# Patient Record
Sex: MALE | Race: WHITE | Employment: UNEMPLOYED | URBAN - NONMETROPOLITAN AREA
[De-identification: names, ages, dates, MRNs, and addresses within clinical notes are randomized per-mention and may not be internally consistent; named-entity substitution may affect disease eponyms.]

---

## 2017-03-23 ENCOUNTER — HOSPITAL ENCOUNTER (OUTPATIENT)
Age: 32
Setting detail: SPECIMEN
Discharge: HOME | End: 2017-03-23
Payer: MEDICARE

## 2017-03-23 LAB
ABSOLUTE EOS #: 0.2 K/UL (ref 0–0.4)
ABSOLUTE LYMPH #: 2.8 K/UL (ref 1–4.8)
ABSOLUTE MONO #: 0.7 K/UL (ref 0–1)
ALBUMIN SERPL-MCNC: 4.7 G/DL (ref 3.5–5.2)
ALBUMIN/GLOBULIN RATIO: 2.4 (ref 1–2.5)
ALP BLD-CCNC: 70 U/L (ref 40–129)
ALT SERPL-CCNC: 69 U/L (ref 5–41)
ANION GAP SERPL CALCULATED.3IONS-SCNC: 14 MMOL/L (ref 9–17)
AST SERPL-CCNC: 39 U/L
BASOPHILS # BLD: 1 % (ref 0–2)
BASOPHILS ABSOLUTE: 0 K/UL (ref 0–0.2)
BILIRUB SERPL-MCNC: 0.54 MG/DL (ref 0.3–1.2)
BUN BLDV-MCNC: 16 MG/DL (ref 6–20)
BUN/CREAT BLD: 19 (ref 9–20)
CALCIUM SERPL-MCNC: 9.4 MG/DL (ref 8.6–10.4)
CHLORIDE BLD-SCNC: 102 MMOL/L (ref 98–107)
CHOLESTEROL/HDL RATIO: 3.7
CHOLESTEROL: 152 MG/DL
CO2: 27 MMOL/L (ref 20–31)
CREAT SERPL-MCNC: 0.83 MG/DL (ref 0.7–1.2)
DIFFERENTIAL TYPE: NORMAL
EOSINOPHILS RELATIVE PERCENT: 3 % (ref 0–8)
GFR AFRICAN AMERICAN: >60 ML/MIN
GFR NON-AFRICAN AMERICAN: >60 ML/MIN
GFR SERPL CREATININE-BSD FRML MDRD: ABNORMAL ML/MIN/{1.73_M2}
GFR SERPL CREATININE-BSD FRML MDRD: ABNORMAL ML/MIN/{1.73_M2}
GLUCOSE BLD-MCNC: 94 MG/DL (ref 70–99)
HBV SURFACE AB TITR SER: <3.5 MIU/ML
HCT VFR BLD CALC: 47.7 % (ref 41–53)
HDLC SERPL-MCNC: 41 MG/DL
HEMOGLOBIN: 15.8 G/DL (ref 13.5–17)
HEPATITIS B SURFACE ANTIGEN: NONREACTIVE
HEPATITIS C ANTIBODY: NONREACTIVE
LDL CHOLESTEROL: 67 MG/DL (ref 0–130)
LYMPHOCYTES # BLD: 33 % (ref 24–44)
MCH RBC QN AUTO: 28.1 PG (ref 26–34)
MCHC RBC AUTO-ENTMCNC: 33.2 G/DL (ref 31–37)
MCV RBC AUTO: 84.7 FL (ref 80–100)
MONOCYTES # BLD: 9 % (ref 0–12)
PDW BLD-RTO: 14.8 % (ref 12.1–15.2)
PLATELET # BLD: 213 K/UL (ref 140–450)
PLATELET ESTIMATE: NORMAL
PMV BLD AUTO: 9.2 FL (ref 6–12)
POTASSIUM SERPL-SCNC: 4.1 MMOL/L (ref 3.7–5.3)
RBC # BLD: 5.63 M/UL (ref 4.5–5.9)
RBC # BLD: NORMAL 10*6/UL
SEG NEUTROPHILS: 54 % (ref 36–66)
SEGMENTED NEUTROPHILS ABSOLUTE COUNT: 4.7 K/UL (ref 1.8–7.7)
SODIUM BLD-SCNC: 143 MMOL/L (ref 135–144)
T. PALLIDUM, IGG: NONREACTIVE
TOTAL PROTEIN: 6.7 G/DL (ref 6.4–8.3)
TRIGL SERPL-MCNC: 219 MG/DL
VALPROIC ACID LEVEL: 70 UG/ML (ref 50–100)
VALPROIC DATE LAST DOSE: NORMAL
VALPROIC DOSE AMOUNT: NORMAL
VALPROIC TIME LAST DOSE: NORMAL
VLDLC SERPL CALC-MCNC: ABNORMAL MG/DL (ref 1–30)
WBC # BLD: 8.5 K/UL (ref 3.5–11)
WBC # BLD: NORMAL 10*3/UL

## 2017-03-23 PROCEDURE — 36415 COLL VENOUS BLD VENIPUNCTURE: CPT

## 2017-03-23 PROCEDURE — 86780 TREPONEMA PALLIDUM: CPT

## 2017-03-23 PROCEDURE — 85025 COMPLETE CBC W/AUTO DIFF WBC: CPT

## 2017-03-23 PROCEDURE — P9604 ONE-WAY ALLOW PRORATED TRIP: HCPCS

## 2017-03-23 PROCEDURE — 80061 LIPID PANEL: CPT

## 2017-03-23 PROCEDURE — 87340 HEPATITIS B SURFACE AG IA: CPT

## 2017-03-23 PROCEDURE — 80164 ASSAY DIPROPYLACETIC ACD TOT: CPT

## 2017-03-23 PROCEDURE — 80053 COMPREHEN METABOLIC PANEL: CPT

## 2017-03-23 PROCEDURE — 86803 HEPATITIS C AB TEST: CPT

## 2017-03-23 PROCEDURE — 80183 DRUG SCRN QUANT OXCARBAZEPIN: CPT

## 2017-03-23 PROCEDURE — 86706 HEP B SURFACE ANTIBODY: CPT

## 2017-03-27 LAB — OXCARBAZEPINE: 26 UG/ML (ref 3–35)

## 2017-03-30 ENCOUNTER — HOSPITAL ENCOUNTER (OUTPATIENT)
Age: 32
Setting detail: SPECIMEN
Discharge: HOME | End: 2017-03-30
Payer: MEDICARE

## 2017-03-30 LAB
ESTIMATED AVERAGE GLUCOSE: 117 MG/DL
HBA1C MFR BLD: 5.7 % (ref 4.8–5.9)

## 2017-03-30 PROCEDURE — P9604 ONE-WAY ALLOW PRORATED TRIP: HCPCS

## 2017-03-30 PROCEDURE — 36415 COLL VENOUS BLD VENIPUNCTURE: CPT

## 2017-03-30 PROCEDURE — 83036 HEMOGLOBIN GLYCOSYLATED A1C: CPT

## 2017-04-10 ENCOUNTER — HOSPITAL ENCOUNTER (OUTPATIENT)
Dept: NON INVASIVE DIAGNOSTICS | Age: 32
Discharge: HOME | End: 2017-04-10
Payer: MEDICARE

## 2017-04-10 LAB
EKG ATRIAL RATE: 93 BPM
EKG P AXIS: 45 DEGREES
EKG P-R INTERVAL: 210 MS
EKG Q-T INTERVAL: 326 MS
EKG QRS DURATION: 102 MS
EKG QTC CALCULATION (BAZETT): 405 MS
EKG R AXIS: 103 DEGREES
EKG T AXIS: 2 DEGREES
EKG VENTRICULAR RATE: 93 BPM

## 2017-04-10 PROCEDURE — 93005 ELECTROCARDIOGRAM TRACING: CPT

## 2017-09-11 ENCOUNTER — HOSPITAL ENCOUNTER (OUTPATIENT)
Dept: NON INVASIVE DIAGNOSTICS | Age: 32
Discharge: HOME | End: 2017-09-11
Payer: MEDICARE

## 2017-09-11 PROCEDURE — 93005 ELECTROCARDIOGRAM TRACING: CPT

## 2017-09-12 LAB
EKG ATRIAL RATE: 85 BPM
EKG P AXIS: 59 DEGREES
EKG P-R INTERVAL: 206 MS
EKG Q-T INTERVAL: 348 MS
EKG QRS DURATION: 98 MS
EKG QTC CALCULATION (BAZETT): 414 MS
EKG R AXIS: 77 DEGREES
EKG T AXIS: 13 DEGREES
EKG VENTRICULAR RATE: 85 BPM

## 2017-09-21 ENCOUNTER — HOSPITAL ENCOUNTER (OUTPATIENT)
Age: 32
Setting detail: SPECIMEN
Discharge: HOME | End: 2017-09-21
Payer: MEDICARE

## 2017-09-21 LAB
ALBUMIN SERPL-MCNC: 5.2 G/DL (ref 3.5–5.2)
ALBUMIN/GLOBULIN RATIO: 3.3 (ref 1–2.5)
ALP BLD-CCNC: 79 U/L (ref 40–129)
ALT SERPL-CCNC: 53 U/L (ref 5–41)
ANION GAP SERPL CALCULATED.3IONS-SCNC: 18 MMOL/L (ref 9–17)
AST SERPL-CCNC: 26 U/L
BILIRUB SERPL-MCNC: 0.51 MG/DL (ref 0.3–1.2)
BUN BLDV-MCNC: 12 MG/DL (ref 6–20)
BUN/CREAT BLD: 16 (ref 9–20)
CALCIUM SERPL-MCNC: 10 MG/DL (ref 8.6–10.4)
CHLORIDE BLD-SCNC: 100 MMOL/L (ref 98–107)
CO2: 24 MMOL/L (ref 20–31)
CREAT SERPL-MCNC: 0.73 MG/DL (ref 0.7–1.2)
ESTIMATED AVERAGE GLUCOSE: 100 MG/DL
GFR AFRICAN AMERICAN: >60 ML/MIN
GFR NON-AFRICAN AMERICAN: >60 ML/MIN
GFR SERPL CREATININE-BSD FRML MDRD: ABNORMAL ML/MIN/{1.73_M2}
GFR SERPL CREATININE-BSD FRML MDRD: ABNORMAL ML/MIN/{1.73_M2}
GLUCOSE BLD-MCNC: 83 MG/DL (ref 70–99)
HBA1C MFR BLD: 5.1 % (ref 4.8–5.9)
HCT VFR BLD CALC: 47.6 % (ref 41–53)
HEMOGLOBIN: 16.2 G/DL (ref 13.5–17)
MCH RBC QN AUTO: 28.7 PG (ref 26–34)
MCHC RBC AUTO-ENTMCNC: 34 G/DL (ref 31–37)
MCV RBC AUTO: 84.7 FL (ref 80–100)
PDW BLD-RTO: 14.8 % (ref 12.1–15.2)
PLATELET # BLD: 179 K/UL (ref 140–450)
PMV BLD AUTO: 9.3 FL (ref 6–12)
POTASSIUM SERPL-SCNC: 4.5 MMOL/L (ref 3.7–5.3)
RBC # BLD: 5.62 M/UL (ref 4.5–5.9)
SODIUM BLD-SCNC: 142 MMOL/L (ref 135–144)
TOTAL PROTEIN: 6.8 G/DL (ref 6.4–8.3)
VALPROIC ACID LEVEL: 82 UG/ML (ref 50–100)
VALPROIC DATE LAST DOSE: NORMAL
VALPROIC DOSE AMOUNT: NORMAL
VALPROIC TIME LAST DOSE: NORMAL
WBC # BLD: 7.5 K/UL (ref 3.5–11)

## 2017-09-21 PROCEDURE — 80183 DRUG SCRN QUANT OXCARBAZEPIN: CPT

## 2017-09-21 PROCEDURE — 85027 COMPLETE CBC AUTOMATED: CPT

## 2017-09-21 PROCEDURE — 83036 HEMOGLOBIN GLYCOSYLATED A1C: CPT

## 2017-09-21 PROCEDURE — 36415 COLL VENOUS BLD VENIPUNCTURE: CPT

## 2017-09-21 PROCEDURE — 80164 ASSAY DIPROPYLACETIC ACD TOT: CPT

## 2017-09-21 PROCEDURE — 80053 COMPREHEN METABOLIC PANEL: CPT

## 2017-09-21 PROCEDURE — P9604 ONE-WAY ALLOW PRORATED TRIP: HCPCS

## 2017-09-25 LAB — OXCARBAZEPINE: 28 UG/ML (ref 3–35)

## 2017-11-21 ENCOUNTER — HOSPITAL ENCOUNTER (OUTPATIENT)
Age: 32
Setting detail: SPECIMEN
Discharge: HOME | End: 2017-11-21
Payer: MEDICARE

## 2017-11-24 ENCOUNTER — HOSPITAL ENCOUNTER (OUTPATIENT)
Age: 32
Setting detail: SPECIMEN
Discharge: HOME | End: 2017-11-24
Payer: MEDICARE

## 2017-11-24 LAB
HBV SURFACE AB TITR SER: >1000 MIU/ML
VALPROIC ACID LEVEL: 71 UG/ML (ref 50–125)
VALPROIC DATE LAST DOSE: NORMAL
VALPROIC DOSE AMOUNT: NORMAL
VALPROIC TIME LAST DOSE: NORMAL

## 2017-11-24 PROCEDURE — 86317 IMMUNOASSAY INFECTIOUS AGENT: CPT

## 2017-11-24 PROCEDURE — 80164 ASSAY DIPROPYLACETIC ACD TOT: CPT

## 2017-11-24 PROCEDURE — 36415 COLL VENOUS BLD VENIPUNCTURE: CPT

## 2017-11-24 PROCEDURE — P9604 ONE-WAY ALLOW PRORATED TRIP: HCPCS

## 2018-01-04 ENCOUNTER — HOSPITAL ENCOUNTER (OUTPATIENT)
Age: 33
Setting detail: SPECIMEN
Discharge: HOME | End: 2018-01-04
Payer: MEDICARE

## 2018-01-04 LAB
VALPROIC ACID LEVEL: 84 UG/ML (ref 50–125)
VALPROIC DATE LAST DOSE: NORMAL
VALPROIC DOSE AMOUNT: NORMAL
VALPROIC TIME LAST DOSE: NORMAL

## 2018-01-04 PROCEDURE — 80164 ASSAY DIPROPYLACETIC ACD TOT: CPT

## 2018-01-04 PROCEDURE — P9604 ONE-WAY ALLOW PRORATED TRIP: HCPCS

## 2018-01-04 PROCEDURE — 36415 COLL VENOUS BLD VENIPUNCTURE: CPT

## 2018-01-17 ENCOUNTER — HOSPITAL ENCOUNTER (EMERGENCY)
Age: 33
Discharge: OTHER INSTITUTION | End: 2018-01-18
Attending: EMERGENCY MEDICINE
Payer: MEDICARE

## 2018-01-17 LAB
ALBUMIN SERPL-MCNC: 4.8 G/DL (ref 3.5–5.2)
ALBUMIN/GLOBULIN RATIO: 2.3 (ref 1–2.5)
ALP BLD-CCNC: 61 U/L (ref 40–129)
ALT SERPL-CCNC: 37 U/L (ref 5–41)
ANION GAP SERPL CALCULATED.3IONS-SCNC: 14 MMOL/L (ref 9–17)
AST SERPL-CCNC: 29 U/L
BILIRUB SERPL-MCNC: 0.38 MG/DL (ref 0.3–1.2)
BUN BLDV-MCNC: 17 MG/DL (ref 6–20)
BUN/CREAT BLD: 22 (ref 9–20)
CALCIUM SERPL-MCNC: 9.5 MG/DL (ref 8.6–10.4)
CHLORIDE BLD-SCNC: 96 MMOL/L (ref 98–107)
CO2: 26 MMOL/L (ref 20–31)
CREAT SERPL-MCNC: 0.78 MG/DL (ref 0.7–1.2)
GFR AFRICAN AMERICAN: >60 ML/MIN
GFR NON-AFRICAN AMERICAN: >60 ML/MIN
GFR SERPL CREATININE-BSD FRML MDRD: ABNORMAL ML/MIN/{1.73_M2}
GFR SERPL CREATININE-BSD FRML MDRD: ABNORMAL ML/MIN/{1.73_M2}
GLUCOSE BLD-MCNC: 109 MG/DL (ref 70–99)
HCT VFR BLD CALC: 46.4 % (ref 41–53)
HEMOGLOBIN: 14.9 G/DL (ref 13.5–17)
MCH RBC QN AUTO: 27.6 PG (ref 26–34)
MCHC RBC AUTO-ENTMCNC: 32.1 G/DL (ref 31–37)
MCV RBC AUTO: 85.9 FL (ref 80–100)
MYOGLOBIN: 36 NG/ML (ref 28–72)
NRBC AUTOMATED: NORMAL PER 100 WBC
PDW BLD-RTO: 14.7 % (ref 12.1–15.2)
PLATELET # BLD: 214 K/UL (ref 140–450)
PMV BLD AUTO: 8.4 FL (ref 6–12)
POTASSIUM SERPL-SCNC: 4.2 MMOL/L (ref 3.7–5.3)
RBC # BLD: 5.4 M/UL (ref 4.5–5.9)
SODIUM BLD-SCNC: 136 MMOL/L (ref 135–144)
TOTAL CK: 178 U/L (ref 39–308)
TOTAL PROTEIN: 6.9 G/DL (ref 6.4–8.3)
WBC # BLD: 10.8 K/UL (ref 3.5–11)

## 2018-01-17 PROCEDURE — 83874 ASSAY OF MYOGLOBIN: CPT

## 2018-01-17 PROCEDURE — 6360000002 HC RX W HCPCS: Performed by: EMERGENCY MEDICINE

## 2018-01-17 PROCEDURE — 90715 TDAP VACCINE 7 YRS/> IM: CPT | Performed by: EMERGENCY MEDICINE

## 2018-01-17 PROCEDURE — 90471 IMMUNIZATION ADMIN: CPT | Performed by: EMERGENCY MEDICINE

## 2018-01-17 PROCEDURE — 80053 COMPREHEN METABOLIC PANEL: CPT

## 2018-01-17 PROCEDURE — 82550 ASSAY OF CK (CPK): CPT

## 2018-01-17 PROCEDURE — 99284 EMERGENCY DEPT VISIT MOD MDM: CPT

## 2018-01-17 PROCEDURE — 96374 THER/PROPH/DIAG INJ IV PUSH: CPT

## 2018-01-17 PROCEDURE — 85027 COMPLETE CBC AUTOMATED: CPT

## 2018-01-17 RX ORDER — LORAZEPAM 2 MG/ML
0.5 INJECTION INTRAMUSCULAR ONCE
Status: COMPLETED | OUTPATIENT
Start: 2018-01-17 | End: 2018-01-17

## 2018-01-17 RX ADMIN — LORAZEPAM 0.5 MG: 2 INJECTION, SOLUTION INTRAMUSCULAR; INTRAVENOUS at 21:42

## 2018-01-17 RX ADMIN — TETANUS TOXOID, REDUCED DIPHTHERIA TOXOID AND ACELLULAR PERTUSSIS VACCINE, ADSORBED 0.5 ML: 5; 2.5; 8; 8; 2.5 SUSPENSION INTRAMUSCULAR at 23:47

## 2018-01-18 ENCOUNTER — HOSPITAL ENCOUNTER (INPATIENT)
Age: 33
LOS: 2 days | Discharge: NURSING, REHAB OR LONG-TERM CARE | DRG: 923 | End: 2018-01-20
Attending: EMERGENCY MEDICINE | Admitting: SURGERY
Payer: MEDICARE

## 2018-01-18 VITALS
DIASTOLIC BLOOD PRESSURE: 80 MMHG | OXYGEN SATURATION: 98 % | HEART RATE: 100 BPM | TEMPERATURE: 98.9 F | RESPIRATION RATE: 18 BRPM | SYSTOLIC BLOOD PRESSURE: 168 MMHG

## 2018-01-18 DIAGNOSIS — T33.821A FROSTBITE OF BOTH FEET, INITIAL ENCOUNTER: Primary | ICD-10-CM

## 2018-01-18 DIAGNOSIS — T33.822A FROSTBITE OF BOTH FEET, INITIAL ENCOUNTER: Primary | ICD-10-CM

## 2018-01-18 LAB
ANION GAP SERPL CALCULATED.3IONS-SCNC: 14 MMOL/L (ref 9–17)
BUN BLDV-MCNC: 14 MG/DL (ref 6–20)
BUN/CREAT BLD: ABNORMAL (ref 9–20)
CALCIUM SERPL-MCNC: 9.7 MG/DL (ref 8.6–10.4)
CHLORIDE BLD-SCNC: 99 MMOL/L (ref 98–107)
CO2: 23 MMOL/L (ref 20–31)
CREAT SERPL-MCNC: 0.79 MG/DL (ref 0.7–1.2)
GFR AFRICAN AMERICAN: >60 ML/MIN
GFR NON-AFRICAN AMERICAN: >60 ML/MIN
GFR SERPL CREATININE-BSD FRML MDRD: ABNORMAL ML/MIN/{1.73_M2}
GFR SERPL CREATININE-BSD FRML MDRD: ABNORMAL ML/MIN/{1.73_M2}
GLUCOSE BLD-MCNC: 109 MG/DL (ref 70–99)
HCT VFR BLD CALC: 48.3 % (ref 40.7–50.3)
HEMOGLOBIN: 15.5 G/DL (ref 13–17)
MCH RBC QN AUTO: 28.7 PG (ref 25.2–33.5)
MCHC RBC AUTO-ENTMCNC: 32.1 G/DL (ref 28.4–34.8)
MCV RBC AUTO: 89.3 FL (ref 82.6–102.9)
NRBC AUTOMATED: 0 PER 100 WBC
PDW BLD-RTO: 14 % (ref 11.8–14.4)
PLATELET # BLD: 199 K/UL (ref 138–453)
PMV BLD AUTO: 10.2 FL (ref 8.1–13.5)
POTASSIUM SERPL-SCNC: 4.2 MMOL/L (ref 3.7–5.3)
RBC # BLD: 5.41 M/UL (ref 4.21–5.77)
SODIUM BLD-SCNC: 136 MMOL/L (ref 135–144)
WBC # BLD: 9.5 K/UL (ref 3.5–11.3)

## 2018-01-18 PROCEDURE — 94762 N-INVAS EAR/PLS OXIMTRY CONT: CPT

## 2018-01-18 PROCEDURE — 6370000000 HC RX 637 (ALT 250 FOR IP): Performed by: STUDENT IN AN ORGANIZED HEALTH CARE EDUCATION/TRAINING PROGRAM

## 2018-01-18 PROCEDURE — 6360000002 HC RX W HCPCS: Performed by: EMERGENCY MEDICINE

## 2018-01-18 PROCEDURE — 2580000003 HC RX 258: Performed by: STUDENT IN AN ORGANIZED HEALTH CARE EDUCATION/TRAINING PROGRAM

## 2018-01-18 PROCEDURE — 6370000000 HC RX 637 (ALT 250 FOR IP): Performed by: NURSE PRACTITIONER

## 2018-01-18 PROCEDURE — 0HDMXZZ EXTRACTION OF RIGHT FOOT SKIN, EXTERNAL APPROACH: ICD-10-PCS | Performed by: PLASTIC SURGERY

## 2018-01-18 PROCEDURE — 99284 EMERGENCY DEPT VISIT MOD MDM: CPT

## 2018-01-18 PROCEDURE — G8978 MOBILITY CURRENT STATUS: HCPCS

## 2018-01-18 PROCEDURE — 97530 THERAPEUTIC ACTIVITIES: CPT

## 2018-01-18 PROCEDURE — 97162 PT EVAL MOD COMPLEX 30 MIN: CPT

## 2018-01-18 PROCEDURE — G8979 MOBILITY GOAL STATUS: HCPCS

## 2018-01-18 PROCEDURE — 2500000003 HC RX 250 WO HCPCS: Performed by: STUDENT IN AN ORGANIZED HEALTH CARE EDUCATION/TRAINING PROGRAM

## 2018-01-18 PROCEDURE — 36415 COLL VENOUS BLD VENIPUNCTURE: CPT

## 2018-01-18 PROCEDURE — 80048 BASIC METABOLIC PNL TOTAL CA: CPT

## 2018-01-18 PROCEDURE — 1200000000 HC SEMI PRIVATE

## 2018-01-18 PROCEDURE — 0HDNXZZ EXTRACTION OF LEFT FOOT SKIN, EXTERNAL APPROACH: ICD-10-PCS | Performed by: PLASTIC SURGERY

## 2018-01-18 PROCEDURE — 6360000002 HC RX W HCPCS: Performed by: SURGERY

## 2018-01-18 PROCEDURE — 16020 DRESS/DEBRID P-THICK BURN S: CPT

## 2018-01-18 PROCEDURE — 85027 COMPLETE CBC AUTOMATED: CPT

## 2018-01-18 RX ORDER — PANTOPRAZOLE SODIUM 40 MG/1
40 TABLET, DELAYED RELEASE ORAL
Status: DISCONTINUED | OUTPATIENT
Start: 2018-01-18 | End: 2018-01-18

## 2018-01-18 RX ORDER — ONDANSETRON 2 MG/ML
4 INJECTION INTRAMUSCULAR; INTRAVENOUS EVERY 6 HOURS PRN
Status: DISCONTINUED | OUTPATIENT
Start: 2018-01-18 | End: 2018-01-21 | Stop reason: HOSPADM

## 2018-01-18 RX ORDER — ENALAPRIL MALEATE 10 MG/1
10 TABLET ORAL DAILY
Status: DISCONTINUED | OUTPATIENT
Start: 2018-01-18 | End: 2018-01-21 | Stop reason: HOSPADM

## 2018-01-18 RX ORDER — FENTANYL CITRATE 50 UG/ML
25 INJECTION, SOLUTION INTRAMUSCULAR; INTRAVENOUS
Status: DISCONTINUED | OUTPATIENT
Start: 2018-01-18 | End: 2018-01-21 | Stop reason: HOSPADM

## 2018-01-18 RX ORDER — DIVALPROEX SODIUM 250 MG/1
1250 TABLET, DELAYED RELEASE ORAL NIGHTLY
COMMUNITY

## 2018-01-18 RX ORDER — QUETIAPINE FUMARATE 100 MG/1
100 TABLET, FILM COATED ORAL
COMMUNITY
End: 2018-02-03

## 2018-01-18 RX ORDER — ACETAMINOPHEN 325 MG/1
650 TABLET ORAL EVERY 4 HOURS PRN
COMMUNITY

## 2018-01-18 RX ORDER — MORPHINE SULFATE 4 MG/ML
4 INJECTION, SOLUTION INTRAMUSCULAR; INTRAVENOUS
Status: DISCONTINUED | OUTPATIENT
Start: 2018-01-18 | End: 2018-01-21 | Stop reason: HOSPADM

## 2018-01-18 RX ORDER — ESCITALOPRAM OXALATE 10 MG/1
20 TABLET ORAL DAILY
Status: DISCONTINUED | OUTPATIENT
Start: 2018-01-18 | End: 2018-01-18

## 2018-01-18 RX ORDER — QUETIAPINE FUMARATE 100 MG/1
100 TABLET, FILM COATED ORAL EVERY MORNING
Status: DISCONTINUED | OUTPATIENT
Start: 2018-01-18 | End: 2018-01-18

## 2018-01-18 RX ORDER — QUETIAPINE FUMARATE 200 MG/1
400 TABLET, FILM COATED ORAL
Status: DISCONTINUED | OUTPATIENT
Start: 2018-01-18 | End: 2018-01-21 | Stop reason: HOSPADM

## 2018-01-18 RX ORDER — LITHIUM CARBONATE 300 MG/1
300 CAPSULE ORAL
Status: DISCONTINUED | OUTPATIENT
Start: 2018-01-18 | End: 2018-01-18

## 2018-01-18 RX ORDER — DIVALPROEX SODIUM 250 MG/1
250 TABLET, DELAYED RELEASE ORAL
COMMUNITY

## 2018-01-18 RX ORDER — LORATADINE 10 MG/1
10 TABLET ORAL DAILY PRN
COMMUNITY

## 2018-01-18 RX ORDER — DIVALPROEX SODIUM 250 MG/1
250 TABLET, EXTENDED RELEASE ORAL EVERY MORNING
Status: DISCONTINUED | OUTPATIENT
Start: 2018-01-18 | End: 2018-01-21 | Stop reason: HOSPADM

## 2018-01-18 RX ORDER — OXCARBAZEPINE 600 MG/1
600 TABLET, FILM COATED ORAL 3 TIMES DAILY
COMMUNITY

## 2018-01-18 RX ORDER — ENALAPRIL MALEATE 10 MG/1
10 TABLET ORAL 2 TIMES DAILY
Status: DISCONTINUED | OUTPATIENT
Start: 2018-01-18 | End: 2018-01-18

## 2018-01-18 RX ORDER — ACETAMINOPHEN 325 MG/1
650 TABLET ORAL EVERY 4 HOURS PRN
Status: DISCONTINUED | OUTPATIENT
Start: 2018-01-18 | End: 2018-01-21 | Stop reason: HOSPADM

## 2018-01-18 RX ORDER — SODIUM CHLORIDE 0.9 % (FLUSH) 0.9 %
10 SYRINGE (ML) INJECTION PRN
Status: DISCONTINUED | OUTPATIENT
Start: 2018-01-18 | End: 2018-01-21 | Stop reason: HOSPADM

## 2018-01-18 RX ORDER — ZONISAMIDE 100 MG/1
100 CAPSULE ORAL 3 TIMES DAILY
Status: DISCONTINUED | OUTPATIENT
Start: 2018-01-18 | End: 2018-01-18

## 2018-01-18 RX ORDER — QUETIAPINE FUMARATE 200 MG/1
400 TABLET, FILM COATED ORAL DAILY
Status: DISCONTINUED | OUTPATIENT
Start: 2018-01-18 | End: 2018-01-18

## 2018-01-18 RX ORDER — SODIUM CHLORIDE 0.9 % (FLUSH) 0.9 %
10 SYRINGE (ML) INJECTION EVERY 12 HOURS SCHEDULED
Status: DISCONTINUED | OUTPATIENT
Start: 2018-01-18 | End: 2018-01-21 | Stop reason: HOSPADM

## 2018-01-18 RX ORDER — QUETIAPINE FUMARATE 200 MG/1
200 TABLET, FILM COATED ORAL 2 TIMES DAILY
Status: DISCONTINUED | OUTPATIENT
Start: 2018-01-18 | End: 2018-01-18

## 2018-01-18 RX ORDER — BUSPIRONE HYDROCHLORIDE 15 MG/1
30 TABLET ORAL 2 TIMES DAILY
Status: DISCONTINUED | OUTPATIENT
Start: 2018-01-18 | End: 2018-01-21 | Stop reason: HOSPADM

## 2018-01-18 RX ORDER — CETIRIZINE HYDROCHLORIDE 10 MG/1
10 TABLET ORAL DAILY
Status: DISCONTINUED | OUTPATIENT
Start: 2018-01-18 | End: 2018-01-21 | Stop reason: HOSPADM

## 2018-01-18 RX ORDER — QUETIAPINE FUMARATE 100 MG/1
100 TABLET, FILM COATED ORAL EVERY MORNING
Status: DISCONTINUED | OUTPATIENT
Start: 2018-01-19 | End: 2018-01-21 | Stop reason: HOSPADM

## 2018-01-18 RX ORDER — OXYCODONE HYDROCHLORIDE 5 MG/1
10 TABLET ORAL EVERY 4 HOURS PRN
Status: DISCONTINUED | OUTPATIENT
Start: 2018-01-18 | End: 2018-01-21 | Stop reason: HOSPADM

## 2018-01-18 RX ORDER — QUETIAPINE FUMARATE 200 MG/1
500 TABLET, FILM COATED ORAL
COMMUNITY

## 2018-01-18 RX ORDER — DIVALPROEX SODIUM 500 MG/1
500 TABLET, EXTENDED RELEASE ORAL 2 TIMES DAILY
Status: DISCONTINUED | OUTPATIENT
Start: 2018-01-18 | End: 2018-01-18

## 2018-01-18 RX ORDER — OXCARBAZEPINE 300 MG/1
600 TABLET, FILM COATED ORAL 3 TIMES DAILY
Status: DISCONTINUED | OUTPATIENT
Start: 2018-01-18 | End: 2018-01-21 | Stop reason: HOSPADM

## 2018-01-18 RX ORDER — OXCARBAZEPINE 150 MG/1
150 TABLET, FILM COATED ORAL 4 TIMES DAILY
Status: DISCONTINUED | OUTPATIENT
Start: 2018-01-18 | End: 2018-01-18

## 2018-01-18 RX ORDER — OXYCODONE HYDROCHLORIDE 5 MG/1
5 TABLET ORAL EVERY 4 HOURS PRN
Status: DISCONTINUED | OUTPATIENT
Start: 2018-01-18 | End: 2018-01-21 | Stop reason: HOSPADM

## 2018-01-18 RX ORDER — FENTANYL CITRATE 50 UG/ML
50 INJECTION, SOLUTION INTRAMUSCULAR; INTRAVENOUS
Status: DISCONTINUED | OUTPATIENT
Start: 2018-01-18 | End: 2018-01-21 | Stop reason: HOSPADM

## 2018-01-18 RX ORDER — OXCARBAZEPINE 150 MG/1
150 TABLET, FILM COATED ORAL 3 TIMES DAILY
Status: DISCONTINUED | OUTPATIENT
Start: 2018-01-18 | End: 2018-01-18

## 2018-01-18 RX ORDER — MORPHINE SULFATE 2 MG/ML
2 INJECTION, SOLUTION INTRAMUSCULAR; INTRAVENOUS
Status: DISCONTINUED | OUTPATIENT
Start: 2018-01-18 | End: 2018-01-21 | Stop reason: HOSPADM

## 2018-01-18 RX ADMIN — DIVALPROEX SODIUM 250 MG: 250 TABLET, FILM COATED, EXTENDED RELEASE ORAL at 13:02

## 2018-01-18 RX ADMIN — BUSPIRONE HYDROCHLORIDE 30 MG: 15 TABLET ORAL at 13:03

## 2018-01-18 RX ADMIN — QUETIAPINE FUMARATE 400 MG: 200 TABLET ORAL at 13:02

## 2018-01-18 RX ADMIN — OXYCODONE HYDROCHLORIDE 10 MG: 5 TABLET ORAL at 20:07

## 2018-01-18 RX ADMIN — OXYCODONE HYDROCHLORIDE 10 MG: 5 TABLET ORAL at 05:50

## 2018-01-18 RX ADMIN — Medication 10 ML: at 13:18

## 2018-01-18 RX ADMIN — Medication 10 ML: at 20:03

## 2018-01-18 RX ADMIN — CETIRIZINE HYDROCHLORIDE 10 MG: 10 TABLET ORAL at 13:02

## 2018-01-18 RX ADMIN — OXCARBAZEPINE 600 MG: 300 TABLET, FILM COATED ORAL at 20:22

## 2018-01-18 RX ADMIN — METOPROLOL TARTRATE 12.5 MG: 25 TABLET ORAL at 13:20

## 2018-01-18 RX ADMIN — FENTANYL CITRATE 25 MCG: 50 INJECTION INTRAMUSCULAR; INTRAVENOUS at 23:25

## 2018-01-18 RX ADMIN — SILVER SULFADIAZINE: 10 CREAM TOPICAL at 23:26

## 2018-01-18 RX ADMIN — DIVALPROEX SODIUM 1250 MG: 250 TABLET, FILM COATED, EXTENDED RELEASE ORAL at 18:05

## 2018-01-18 RX ADMIN — ENOXAPARIN SODIUM 30 MG: 30 INJECTION SUBCUTANEOUS at 20:22

## 2018-01-18 RX ADMIN — OXYCODONE HYDROCHLORIDE 10 MG: 5 TABLET ORAL at 16:04

## 2018-01-18 RX ADMIN — OXCARBAZEPINE 600 MG: 300 TABLET, FILM COATED ORAL at 13:02

## 2018-01-18 RX ADMIN — SILVER SULFADIAZINE: 10 CREAM TOPICAL at 13:09

## 2018-01-18 RX ADMIN — BUSPIRONE HYDROCHLORIDE 30 MG: 15 TABLET ORAL at 20:03

## 2018-01-18 RX ADMIN — ENALAPRIL MALEATE 10 MG: 10 TABLET ORAL at 13:02

## 2018-01-18 RX ADMIN — OXYCODONE HYDROCHLORIDE 10 MG: 5 TABLET ORAL at 11:47

## 2018-01-18 RX ADMIN — FENTANYL CITRATE 25 MCG: 50 INJECTION INTRAMUSCULAR; INTRAVENOUS at 13:03

## 2018-01-18 ASSESSMENT — ENCOUNTER SYMPTOMS
SINUS PRESSURE: 0
ABDOMINAL PAIN: 0
COLOR CHANGE: 0
WHEEZING: 0
NAUSEA: 0
VOICE CHANGE: 0
ABDOMINAL DISTENTION: 0
PHOTOPHOBIA: 0
VOMITING: 0
SORE THROAT: 0
BLOOD IN STOOL: 0
SINUS PAIN: 0
SHORTNESS OF BREATH: 0
CONSTIPATION: 0
CHEST TIGHTNESS: 0
COUGH: 0
DIARRHEA: 0
CHOKING: 0
TROUBLE SWALLOWING: 0

## 2018-01-18 ASSESSMENT — PAIN SCALES - GENERAL
PAINLEVEL_OUTOF10: 7
PAINLEVEL_OUTOF10: 10
PAINLEVEL_OUTOF10: 6

## 2018-01-18 ASSESSMENT — PAIN DESCRIPTION - FREQUENCY: FREQUENCY: CONTINUOUS

## 2018-01-18 ASSESSMENT — PAIN DESCRIPTION - ORIENTATION
ORIENTATION: RIGHT;LEFT

## 2018-01-18 ASSESSMENT — PAIN DESCRIPTION - PAIN TYPE
TYPE: ACUTE PAIN

## 2018-01-18 ASSESSMENT — PAIN SCALES - WONG BAKER
WONGBAKER_NUMERICALRESPONSE: 6
WONGBAKER_NUMERICALRESPONSE: 6
WONGBAKER_NUMERICALRESPONSE: 8
WONGBAKER_NUMERICALRESPONSE: 8

## 2018-01-18 ASSESSMENT — PAIN DESCRIPTION - LOCATION
LOCATION: FOOT

## 2018-01-18 ASSESSMENT — PAIN DESCRIPTION - DESCRIPTORS: DESCRIPTORS: THROBBING

## 2018-01-18 ASSESSMENT — PAIN DESCRIPTION - ONSET: ONSET: ON-GOING

## 2018-01-18 ASSESSMENT — PAIN DESCRIPTION - PROGRESSION: CLINICAL_PROGRESSION: NOT CHANGED

## 2018-01-18 NOTE — PROGRESS NOTES
[facility]  Occupational Therapy Not Seen Note    Patient not available for Occupational Therapy due to:    [] Testing:    [] Hemodialysis    [] Blood Transfusion in Progress    []Refusal by Patient:    [] Surgery/Procedure:    [] Strict Bedrest    [] Sedation    [] Spine Precautions     [] Pt being transferred to palliative care at this time. Spoke with pt/family and OT services to be defered. [] Pt independent with functional mobility and functional tasks. Pt with no OT acute care needs at this time, will defer OT eval.    [x] Other: Defer to PT for mobility needs. Pt at baseline for all other ADLs. Pt has caregiver and has severe DD. Defer to PT for mobility needs.     Next Scheduled Treatment:    Signature: Rosetta Patterson OTR/DEB, OTHARJINDER

## 2018-01-18 NOTE — CONSULTS
Plastic/Burn Surgery Consult Note    CHIEF COMPLAINT:  Bilateral feet frostbite. HISTORY OF PRESENT ILLNESS: Mireille Reynoso is a 29 y.o. male with h/o mental retardation and intermittent explosive disorder. Patients family is with him at bedside. Patient's family states that he is at a temporary group home. They state that the patient was wandering around outside without shoes on and the facility did not restrain him or stop him from going outdoors. Patients family states that they were told by the facility he was outside for 10 minutes however they read the incident report which said 40 minutes. Patient states that they are very painful and he is unable to walk on his feet. Past Medical History:    Past Medical History:   Diagnosis Date    Chronic mental illness      Past Surgical History:    Past Surgical History:   Procedure Laterality Date   [address] CYST DRAINAGE  2005     Medications Prior to Admission:   Prior to Admission medications    Medication Sig Start Date End Date Taking?  Authorizing Provider   divalproex (DEPAKOTE) 250 MG DR tablet Take 250 mg by mouth daily (with breakfast)   Yes Historical Provider, MD   divalproex (DEPAKOTE) 250 MG DR tablet Take 1,250 mg by mouth nightly   Yes Historical Provider, MD   metoprolol tartrate (LOPRESSOR) 25 MG tablet Take 12.5 mg by mouth 2 times daily   Yes Historical Provider, MD   OXcarbazepine (TRILEPTAL) 600 MG tablet Take 600 mg by mouth 3 times daily   Yes Historical Provider, MD   QUEtiapine (SEROQUEL) 100 MG tablet Take 100 mg by mouth daily (with breakfast)   Yes Historical Provider, MD   QUEtiapine (SEROQUEL) 200 MG tablet Take 400 mg by mouth Daily with lunch   Yes Historical Provider, MD   acetaminophen (TYLENOL) 325 MG tablet Take 650 mg by mouth every 4 hours as needed for Pain or Fever   Yes Historical Provider, MD   loratadine (CLARITIN) 10 MG tablet Take 10 mg by mouth daily as needed   Yes Historical Provider, MD   busPIRone (BUSPAR) 30 MG tablet take 1 tablet by mouth twice a day 11/6/16   Historical Provider, MD   enalapril (VASOTEC) 10 MG tablet Take 10 mg by mouth daily     Historical Provider, MD     Allergies:  Abilify [aripiprazole] and Lithium    Social History:   Social History   Substance Use Topics    Smoking status: Never Smoker    Smokeless tobacco: Never Used    Alcohol use No     Family History: Non contributory. REVIEW OF SYSTEMS:  Negative other than stated above. General: No fever or chills. Neuro: No headache   Cardiopulmonary: No chest pain or shortness of breath. GI: No nausea, vomiting, diarrhea, or abdominal pain. PHYSICAL EXAM:  BP (!) 158/52   Pulse 80   Temp 98.8 °F (37.1 °C) (Axillary)   Resp 16   Wt 248 lb 14.4 oz (112.9 kg)   SpO2 96%   BMI 42.72 kg/m²   Gen: Alert and oriented, NAD. Chest: Symmetric chest excursion, non- labored breathing. Abdomen: non-distended, non-tender. Neurological: SILT bilateral feet. Vascular: DP and PT pulses are 2/4 bilateral. Non-pitting edema to bilateral feet. Musculoskeletal: Muscle strength deferred due to pain. Pain on palpation of bullae. Skin: Serous bullae present on right foot. Post de-kaylah the wound measures approximately 12.0 x 8.0 x 0.2 cm. Granular wound base. No proximal streaking, malodor or signs of infection. Two serous bullae present on left foot. Post de-kaylah the proximal wound measures approximately 6.0 x 5.0 x 0.2 cm. Post de-kaylah the distal wound measures approximately 6.0 x 6.0 x 0.2 cm. Granular wound base. No proximal streaking, malodor, or signs of infection.      Lab Results   Component Value Date    WBC 9.5 01/18/2018    HGB 15.5 01/18/2018    HCT 48.3 01/18/2018    MCV 89.3 01/18/2018     01/18/2018     Lab Results   Component Value Date     01/18/2018    K 4.2 01/18/2018    CL 99 01/18/2018    CO2 23 01/18/2018    BUN 14 01/18/2018    CREATININE 0.79 01/18/2018 GLUCOSE 109 01/18/2018    GLUCOSE 95 10/15/2011    CALCIUM 9.7 01/18/2018        Assessment:   -29 y.o. male with frostbite to bilateral feet and serous bullae bilateral feet s/p de-kaylah. Patient Active Problem List   Diagnosis    Chronic mental illness    Frostbite of feet, bilateral        Plan:  -Patient's family consented to procedure. Fentanyl and roxicodone given prior to procedure. Deroofed the blisters on bilateral feet with sterile scissors and pickups. Applied a silvadene and gauze dressing with ace wrap. Patient reported mild post procedural pain.   -Discussed treatment plan with family including long term treatment of frostbite with wound care. -Twice daily dressing changes with silvadene cream and dry sterile dressing.   -Pain control and medical management per trauma service.   -Will follow with you.   -Discussed with Dr. Stephy Mary.      Jono Diaz, QUINNM PGY-2  Podiatry Resident  [facility]

## 2018-01-18 NOTE — ED NOTES
INITIAL ASSESSMENT:  Arrives [address] [address], interhospital transport from [facility]. Awake/Alert, Oriented x3. GCS=15  Lungs CTAB Post, Heart tones strong, regular. Palpable distal pulses present, immediate distal refill. Abd: Soft, non-tender, non-distended. Handgrasp, push/pull equal.    INITIAL PRESENTATION:    Autistic. Went outside in bare feet, in subfreezing weather without shoes. Has multiple blistered areas on the soles of both feet. Intact distal pulse/motor/sensory.       Khoi Carlos RN  01/18/18 8950

## 2018-01-18 NOTE — ED NOTES
Patient is being transferred to [facility] ER. [facility] ETA 0100.  Number to call report is [facility] Jennifer Camacho  01/18/18 0130

## 2018-01-18 NOTE — ED PROVIDER NOTES
pain, blood in stool, constipation, diarrhea, nausea and vomiting. Endocrine: Negative for polydipsia, polyphagia and polyuria. Genitourinary: Negative for difficulty urinating, dysuria, flank pain, frequency and urgency. Musculoskeletal: Negative for arthralgias, gait problem, joint swelling, myalgias and neck stiffness. Pain to the soles of both feet   Skin: Negative for color change and pallor. Neurological: Negative for dizziness, syncope, facial asymmetry, weakness, light-headedness, numbness and headaches. Physical exam  (up to 7 for level 4, 8 or more for level 5)      BP (!) 157/84   Pulse 102   Temp 97.2 °F (36.2 °C) (Oral)   Resp 20   Wt 268 lb 15.4 oz (122 kg)   SpO2 97%   BMI 46.17 kg/m²     Physical Exam   Constitutional: He is oriented to person, place, and time. He appears well-developed and well-nourished. No distress. HENT:   Head: Normocephalic and atraumatic. Right Ear: External ear normal.   Left Ear: External ear normal.   Eyes: EOM are normal. Pupils are equal, round, and reactive to light. Neck: Normal range of motion. No JVD present. No tracheal deviation present. Cardiovascular: Normal rate, normal heart sounds and intact distal pulses. Exam reveals no gallop and no friction rub. No murmur heard. Pulmonary/Chest: Effort normal and breath sounds normal. No respiratory distress. He has no wheezes. Abdominal: Soft. He exhibits no distension. There is no tenderness. Musculoskeletal: Normal range of motion. He exhibits no deformity. Neurological: He is alert and oriented to person, place, and time. Skin: Skin is warm and dry. He is not diaphoretic.    Large blisters to the soles of both feet     Plan     DIAGNOSTIC ORDERS:  Orders Placed This Encounter   Procedures    Inpatient consult to Trauma Surgery     MEDICATION ORDERS:  Orders Placed This Encounter   Medications    busPIRone (BUSPAR) tablet 30 mg    divalproex (DEPAKOTE ER) extended release

## 2018-01-18 NOTE — FLOWSHEET NOTE
[address] [facility] [address]     Emergency/Trauma Note    PATIENT NAME: Jacinto Canavan    Shift date: 1/17/2018  Shift day: Wednesday   Shift # 3    Room # ED28 Name: Jacinto Canavan            Age: 29 y.o. Gender: male          Scientologist: None   Place of Shinto: None    Trauma/Incident type: Adult Trauma Consult  Admit Date & Time: 1/18/2018  3:00 AM  TRAUMA NAME: N/A    PATIENT/EVENT DESCRIPTION:  Jacinto Canavan is a 29 y.o. male who arrived as a transfer from [facility] as an \"Adult Trauma Consult\" due to \"frostbite. \" Per report, patient resides in a \"group home and went outside twice the last 2 days with no socks or shoes on. \" Pt to be admitted to [facility]/[id number]. SPIRITUAL ASSESSMENT/INTERVENTION:   responded to page and met patient and his support in ED28. Patient was coping well and in good spirits during visit. Patient confirmed that he was comfortable.  provided presence, support, and hospitality to patient in room.  informed nurse of patient's needs. Patient thanked  for visit. PATIENT BELONGINGS:  No belongings noted    ANY BELONGINGS OF SIGNIFICANT VALUE NOTED:  N/A    REGISTRATION STAFF NOTIFIED? Yes      WHAT IS YOUR SPIRITUAL CARE PLAN FOR THIS PATIENT?:  Chaplains will remain available to offer spiritual and emotional support as needed.       Electronically signed by Yandel Schmitt [address] [place] [address], on 1/18/2018 at 5:37 AM.

## 2018-01-18 NOTE — PROGRESS NOTES
DATE OF PROCEDURE:  12/16/2017

 

REFERRING PHYSICIAN:  Dr. Eladio Cooper

INDICATION:  Heart failure unspecified, shortness of breath.

 

HEIGHT:  74 inches

WEIGHT:  295 pounds

 

2D MEASUREMENTS:

Aortic root:  3.2 cm

Proximal ascending aorta:  3.6 cm

Left atrium:  4.4 cm

Ventricular septum:  1.31 cm

Posterior wall:  1.30 cm

Left ventricle diastole:  5.2 cm

Left ventricle systole:  3.2 cm

Aortic annulus:  3.2 cm

Right ventricle:  5.9 cm

Inferior vena cava:  3.6 cm with marked reduction of respiratory variation.

 

DOPPLER MEASUREMENTS:

Aortic valve area:  1.52 cm squared

Aortic stenosis dimensionless index:  0.36

Peak aortic valve velocity:  273 cm/s

Aortic valve VTI:  58.1 cm

Peak aortic valve gradient:  30 mmHg

Mean aortic valve gradient:  18 mmHg

LVOT velocity:  110 cm/s

LVOT VTI:  21.2 cm

Very mild mitral regurgitation.

No mitral stenosis.

Severe tricuspid regurgitation.

Estimated right ventricle systolic pressure:  82 mmHg assuming a right atrial

pressure of 20 mmHg.

Mild pulmonic regurgitation.

 

DESCRIPTION: Rhythm was atrial fibrillation with controlled ventricular response.

This was a 2D, M-mode, color flow Doppler and pulse wave Doppler examination and

included mitral annular tissue Doppler. No pericardial effusion. Image quality

was moderately technically difficult.

 

CONCLUSIONS:

1. Mild concentric left ventricle hypertrophy. Normal regional left ventricular

(LV) wall motion and wall thickening. Normal LV systolic function. Left

ventricular ejection fraction (LVEF) was 65% by visual estimate. Partial

flattening of the interventricular septum in keeping with both pressure and

volume overload of the right ventricle.

 

2. Suggestive of very severe elevation of estimated right ventricle systolic

pressure, at least 82 mmHg. Severe tricuspid regurgitation. Moderate right

ventricle dilatation. Right ventricle hypertrophy with prominence of the

moderator band of the right ventricle. Normal right ventricular (RV) systolic

function. Severe right atrial dilatation.

 

3. 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

suggestive of elevated central venous pressure of at least 20 mmHg.

 

4. Extensive focal thickening and focal calcific deposits of a 3-cusp aortic

valve with mild reduction in aortic cusp mobility. Mild aortic stenosis. No

aortic regurgitation.

 

5. Moderate mitral annular calcification. Very mild mitral valve regurgitation.

No mitral stenosis.

 

6. Mild left atrial dilatation.

 

Copy To:

Dr. Eladio Millan, Nurse Practitioner

Dr. Misbah Figueredo PROGRESS NOTE          PATIENT NAME: Manuela Mix [facility] RECORD NO. [id number]  DATE: 2018  SURGEON: Dr. Diamond Vega: Luther Mcallister MD    HD: # 0    ASSESSMENT    Patient Active Problem List   Diagnosis    Chronic mental illness    Frostbite of feet, bilateral       MEDICAL DECISION MAKING AND PLAN    · Consult to Plastic Surgery - Debridement and dressing of bilateral plantar aspect of feet. F/u recs. · IVF  ·  Continue Silvadene BID  · DVT ppx: lovenox 40 qd  · General Diet  · Pain Control IV morphine, PO 5-10 Roxicodone q4h  · PT/OT eval  · Sedation/Agitation control. · , Franky 36. SUBJECTIVE    Franklyn Phoenix is unchanged since yesterday. Patient has no complaints or concerns this AM. No acute issues overnight. He notes pain to the bilateral plantar aspect of his feel. He denies any fevers, chills, n/v/d. He denies any CP or SOB. OBJECTIVE  VITALS: Temp: Temp: 98.4 °F (36.9 °C)Temp  Av.2 °F (36.8 °C)  Min: 97.2 °F (36.2 °C)  Max: 98.9 °F (73.3 °C) BP Systolic (06TFK), UCJ:699 , Min:157 , WLI:758   Diastolic (58XZH), IEY:09, Min:62, Max:84   Pulse Pulse  Av  Min: 92  Max: 102 Resp Resp  Av.5  Min: 16  Max: 20 Pulse ox SpO2  Av.5 %  Min: 97 %  Max: 98 %  GENERAL: alert, no distress  NEURO: CNII- XII grossly intact. Wiggles toes and full ROM of ankles bilaterally. Sensation intact of toes bilaterally. HEENT: Normocephalic, atraumatic, PERRLA, EOMI. LUNGS: clear to auscultation bilaterally- no wheezes, rales or rhonchi, normal air movement, no respiratory distress and clear to ausculation, without wheezes, rales or rhonci  HEART: normal rate and regular rhythm  ABDOMEN: soft, non-tender, non-distended, bowel sounds present in all 4 quadrants and no guarding or peritoneal signs present  EXTERMITY: no cyanosis, clubbing or edema. Bullae noted to the bilateral plantar aspect of feet. No erythema, discharge or drainage. Sensation intact. Gross motor intact. I/O last 3 completed shifts:  In: -   Out: 50 [Urine:50]    Drain/tube output: In: -   Out: 50 [Urine:50]    LAB:  CBC:   Recent Labs      01/17/18 2136 01/18/18   0639   WBC  10.8  9.5   HGB  14.9  15.5   HCT  46.4  48.3   MCV  85.9  89.3   PLT  214  199     BMP:   Recent Labs      01/17/18 2136 01/18/18   0639   NA  136  136   K  4.2  4.2   CL  96*  99   CO2  26  23   BUN  17  14   CREATININE  0.78  0.79   GLUCOSE  109*  109*     COAGS:   Recent Labs      01/17/18 2136   PROT  6.9       RADIOLOGY:  No radiographic imaging at this time.        Soco Longoria DO  1/18/18, 8:31 AM

## 2018-01-18 NOTE — ED PROVIDER NOTES
This is a 82-year-old male with developmental delay who presents with frostbite to bilateral feet. Apparently he had wandered outside at some point and was found by nursing staff at approximate 7:30 this morning stating outside with no shoes on. It is unknown how long he was out there. His assistant who helps get him to his appointments etc. Came to see him today and he was crawling on the floor to the bathroom. He noticed his feet had bilateral blisters and brought him to the emergency department for evaluation. The majority of the history comes from this caregivers secondhand and minimally from the nursing chart sent by the facility which is at. Nurses notes reviewed including family history and social history. Review of Systems   Unable to perform ROS: Other (Severe developmental delay.)   Neurological: Tremors: .physicalexam.         Physical Exam   Constitutional: He is oriented to person, place, and time and well-developed, well-nourished, and in no distress. HENT:   Head: Normocephalic and atraumatic. Eyes: Conjunctivae and EOM are normal. Pupils are equal, round, and reactive to light. Neck: Normal range of motion. Neck supple. Cardiovascular: Normal rate, regular rhythm and normal heart sounds. Pulmonary/Chest: Effort normal and breath sounds normal.   Abdominal: Soft. Bowel sounds are normal.   Musculoskeletal: Normal range of motion. Neurological: He is alert and oriented to person, place, and time. GCS score is 15. Skin: Rash noted. There is erythema. Both feet are swollen and erythematous. There are multiple large bullae on the soles of both feet. There is no obvious ischemic changes. Pulses are intact   Psychiatric:   Answers limited questions. Caregiver states that patient gets extremely agitated and can become violent. I spoke with the patient's guardian, his mother who also says especially when hospitalized he is a tendency to get agitated.   Normally either her or his father are within around-the-clock if he is admitted to the hospital but are not able to be there until the morning.   For this reason I gave him some Ativan and spoke with the accepting physician regarding the need for continued mild sedation    The patient will be transferred to [facility]. V's for stage II frostbite by management by the trauma team    Diagnosis   @emla@           Mindy Bryant MD  01/17/18 4144
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
## 2018-01-19 PROCEDURE — 1200000000 HC SEMI PRIVATE

## 2018-01-19 PROCEDURE — 6370000000 HC RX 637 (ALT 250 FOR IP): Performed by: STUDENT IN AN ORGANIZED HEALTH CARE EDUCATION/TRAINING PROGRAM

## 2018-01-19 PROCEDURE — 6360000002 HC RX W HCPCS: Performed by: STUDENT IN AN ORGANIZED HEALTH CARE EDUCATION/TRAINING PROGRAM

## 2018-01-19 PROCEDURE — 94762 N-INVAS EAR/PLS OXIMTRY CONT: CPT

## 2018-01-19 PROCEDURE — 2580000003 HC RX 258: Performed by: STUDENT IN AN ORGANIZED HEALTH CARE EDUCATION/TRAINING PROGRAM

## 2018-01-19 PROCEDURE — 6370000000 HC RX 637 (ALT 250 FOR IP): Performed by: NURSE PRACTITIONER

## 2018-01-19 PROCEDURE — 6360000002 HC RX W HCPCS: Performed by: SURGERY

## 2018-01-19 PROCEDURE — 2500000003 HC RX 250 WO HCPCS: Performed by: STUDENT IN AN ORGANIZED HEALTH CARE EDUCATION/TRAINING PROGRAM

## 2018-01-19 PROCEDURE — 6360000002 HC RX W HCPCS: Performed by: EMERGENCY MEDICINE

## 2018-01-19 RX ORDER — PROMETHAZINE HYDROCHLORIDE 25 MG/ML
6.25 INJECTION, SOLUTION INTRAMUSCULAR; INTRAVENOUS EVERY 6 HOURS PRN
Status: DISCONTINUED | OUTPATIENT
Start: 2018-01-19 | End: 2018-01-21 | Stop reason: HOSPADM

## 2018-01-19 RX ORDER — ONDANSETRON 2 MG/ML
4 INJECTION INTRAMUSCULAR; INTRAVENOUS
Status: DISCONTINUED | OUTPATIENT
Start: 2018-01-19 | End: 2018-01-21 | Stop reason: HOSPADM

## 2018-01-19 RX ADMIN — OXCARBAZEPINE 600 MG: 300 TABLET, FILM COATED ORAL at 09:37

## 2018-01-19 RX ADMIN — ONDANSETRON 4 MG: 2 INJECTION INTRAMUSCULAR; INTRAVENOUS at 21:30

## 2018-01-19 RX ADMIN — ENOXAPARIN SODIUM 30 MG: 30 INJECTION SUBCUTANEOUS at 21:30

## 2018-01-19 RX ADMIN — SILVER SULFADIAZINE: 10 CREAM TOPICAL at 09:41

## 2018-01-19 RX ADMIN — METOPROLOL TARTRATE 12.5 MG: 25 TABLET ORAL at 09:37

## 2018-01-19 RX ADMIN — BUSPIRONE HYDROCHLORIDE 30 MG: 15 TABLET ORAL at 21:30

## 2018-01-19 RX ADMIN — PROMETHAZINE HYDROCHLORIDE 6.25 MG: 25 INJECTION INTRAMUSCULAR; INTRAVENOUS at 14:21

## 2018-01-19 RX ADMIN — OXYCODONE HYDROCHLORIDE 10 MG: 5 TABLET ORAL at 00:46

## 2018-01-19 RX ADMIN — QUETIAPINE FUMARATE 100 MG: 100 TABLET ORAL at 09:37

## 2018-01-19 RX ADMIN — ENALAPRIL MALEATE 10 MG: 10 TABLET ORAL at 09:37

## 2018-01-19 RX ADMIN — DIVALPROEX SODIUM 1250 MG: 250 TABLET, FILM COATED, EXTENDED RELEASE ORAL at 18:25

## 2018-01-19 RX ADMIN — DIVALPROEX SODIUM 250 MG: 250 TABLET, FILM COATED, EXTENDED RELEASE ORAL at 09:38

## 2018-01-19 RX ADMIN — ONDANSETRON 4 MG: 2 INJECTION INTRAMUSCULAR; INTRAVENOUS at 08:03

## 2018-01-19 RX ADMIN — OXYCODONE HYDROCHLORIDE 10 MG: 5 TABLET ORAL at 05:30

## 2018-01-19 RX ADMIN — CETIRIZINE HYDROCHLORIDE 10 MG: 10 TABLET ORAL at 09:37

## 2018-01-19 RX ADMIN — ENOXAPARIN SODIUM 30 MG: 30 INJECTION SUBCUTANEOUS at 09:37

## 2018-01-19 RX ADMIN — QUETIAPINE FUMARATE 400 MG: 200 TABLET ORAL at 13:08

## 2018-01-19 RX ADMIN — OXCARBAZEPINE 600 MG: 300 TABLET, FILM COATED ORAL at 21:30

## 2018-01-19 RX ADMIN — OXCARBAZEPINE 600 MG: 300 TABLET, FILM COATED ORAL at 15:01

## 2018-01-19 RX ADMIN — FENTANYL CITRATE 25 MCG: 50 INJECTION INTRAMUSCULAR; INTRAVENOUS at 10:40

## 2018-01-19 RX ADMIN — FENTANYL CITRATE 25 MCG: 50 INJECTION INTRAMUSCULAR; INTRAVENOUS at 22:10

## 2018-01-19 RX ADMIN — OXYCODONE HYDROCHLORIDE 10 MG: 5 TABLET ORAL at 09:35

## 2018-01-19 RX ADMIN — FENTANYL CITRATE 25 MCG: 50 INJECTION INTRAMUSCULAR; INTRAVENOUS at 10:25

## 2018-01-19 RX ADMIN — Medication 10 ML: at 21:31

## 2018-01-19 RX ADMIN — BUSPIRONE HYDROCHLORIDE 30 MG: 15 TABLET ORAL at 09:37

## 2018-01-19 RX ADMIN — OXYCODONE HYDROCHLORIDE 10 MG: 5 TABLET ORAL at 21:34

## 2018-01-19 RX ADMIN — SILVER SULFADIAZINE: 10 CREAM TOPICAL at 21:31

## 2018-01-19 RX ADMIN — MORPHINE SULFATE 4 MG: 4 INJECTION INTRAVENOUS at 15:00

## 2018-01-19 ASSESSMENT — PAIN SCALES - WONG BAKER: WONGBAKER_NUMERICALRESPONSE: 2;4

## 2018-01-19 ASSESSMENT — PAIN DESCRIPTION - PAIN TYPE: TYPE: ACUTE PAIN

## 2018-01-19 ASSESSMENT — PAIN DESCRIPTION - ONSET: ONSET: UNABLE TO TELL

## 2018-01-19 ASSESSMENT — PAIN SCALES - GENERAL
PAINLEVEL_OUTOF10: 8
PAINLEVEL_OUTOF10: 8
PAINLEVEL_OUTOF10: 3

## 2018-01-19 ASSESSMENT — PAIN DESCRIPTION - LOCATION: LOCATION: FOOT

## 2018-01-19 ASSESSMENT — PAIN DESCRIPTION - ORIENTATION: ORIENTATION: RIGHT;LEFT

## 2018-01-19 ASSESSMENT — PAIN DESCRIPTION - PROGRESSION: CLINICAL_PROGRESSION: NOT CHANGED

## 2018-01-19 ASSESSMENT — PAIN DESCRIPTION - DESCRIPTORS: DESCRIPTORS: SORE;PATIENT UNABLE TO DESCRIBE

## 2018-01-19 NOTE — FLOWSHEET NOTE
Date Procedure started:  01-19-18     Time Procedure started:  1030     Location Completed:   x    Bedside     Tubbing Room  Medication Given:  See eMAR          Photos Taken       yes                                                      Please arianne dressing applied to OR debrided injuries/ skin to all areas that apply below:     S=Silvadene    B=Bacitracin    M= Mepilex    F= Furacin        HERRING=Santyl                             SUL=Sulfamylon     D=Donor site/xeroform    E=Eucerin    O=Other (specify below)  DRESSING APPLICATION/ CHANGE DEBRIDEMENT      (Y/N) BODY LOCATION   HEAD, FACE AND NECK         SCALP      RT EAR     LT EAR     NECK     FACE        CHEST     ABDOMEN     BACK     BUTTOCK     GENITALIA     PERINEUM        RT UPPER ARM (Includes Shoulder)     LT UPPER ARM (Includes Shoulder)     RT LOWER ARM (Includes Elbow or Wrist)     LT LOWER ARM (Includes Elbow or Wrist)     RT HAND (Includes Fingers)     LT HAND (Includes Fingers)        RT UPPER LEG (Includes Hip)     LT UPPER LEG (Includes Hip)     RT LOWER LEG (Includes Knee)     LT LOWER LEG (Includes Knee)   S Y RT FOOT (Includes Ankles or Toes)   S Y LT FOOT (Includes Ankles or Toes)     ADDITIONAL NOTES: see media section of EPIC for photos taken during procedure.  Total 50 mcg fentanyl during drsg change, tolerates fairly well considering decreased mental capacity

## 2018-01-19 NOTE — PROGRESS NOTES
0600: Pt arrived to unit with caregiver, Yaima, present from the [employer]. pt is alert. Pt is able to recall his first name, but not his last name or birthday. Pt is unsure of current month/year or current president. Pt is able to recall that he is in a hospital, but isn't sure which facility. Pt knows that he is here, because of injuries sustained to his feet. Caregiver informed writer that this is the pt's baseline behavior and cognitive function level. 0630: received a phone call from the pharmacist regarding pt's current home med regimen. meds were restarted on this pt according to information in EPIC from pt's last visit. EPIC records indicate that pt is taking lithium. Pt is unable to confirm his current medication regimen. Pt has an extensive medical records book with him from facility which indicates that he has allergies to lithium and Abilify. Discussed plan with pharmacist was to hold giving any medications until such time that an updated med record could be found, and after communicating with nursing staff at facility. This was communicated to the day shift nurse at shift change (Suzy SANCHEZ).

## 2018-01-19 NOTE — PROGRESS NOTES
Labs      01/17/18 2136 01/18/18   0639   WBC  10.8  9.5   HGB  14.9  15.5   HCT  46.4  48.3   MCV  85.9  89.3   PLT  214  199     BMP:   Recent Labs      01/17/18 2136 01/18/18   0639   NA  136  136   K  4.2  4.2   CL  96*  99   CO2  26  23   BUN  17  14   CREATININE  0.78  0.79   GLUCOSE  109*  109*     COAGS:   Recent Labs      01/17/18 2136   PROT  6.9       RADIOLOGY:  No radiographic imaging at this time. DO Brendan  1/18/18, 6:46 AM                    Trauma Attending Attestation      I have reviewed the above TECSS note(s) and confirmed the key elements of the medical history and physical exam. I have seen and examined the pt. I have discussed the findings, established the care plan and recommendations with Resident, GCS RN, bedside nurse.   Dressing changes   Plastics recs      Bryn Wolff DO  1/19/2018  1:09 PM

## 2018-01-19 NOTE — PROGRESS NOTES
Date Procedure started:  1/18/18     Time Procedure started:  2300     Location Completed:     x  Bedside     Tubbing Room  Medication Given:  25 mcg Fentanyl          Photos Taken       no                                                       Please arianne dressing applied to OR debrided injuries/ skin to all areas that apply below:     S=Silvadene    B=Bacitracin    M= Mepilex    F= Furacin        HERRING=Santyl                             SUL=Sulfamylon     D=Donor site/xeroform    E=Eucerin    O=Other (specify below)  DRESSING APPLICATION/ CHANGE DEBRIDEMENT      (Y/N) BODY LOCATION   HEAD, FACE AND NECK         SCALP      RT EAR     LT EAR     NECK     FACE        CHEST     ABDOMEN     BACK     BUTTOCK     GENITALIA     PERINEUM        RT UPPER ARM (Includes Shoulder)     LT UPPER ARM (Includes Shoulder)     RT LOWER ARM (Includes Elbow or Wrist)     LT LOWER ARM (Includes Elbow or Wrist)     RT HAND (Includes Fingers)     LT HAND (Includes Fingers)        RT UPPER LEG (Includes Hip)     LT UPPER LEG (Includes Hip)     RT LOWER LEG (Includes Knee)     LT LOWER LEG (Includes Knee)   S N RT FOOT (Includes Ankles or Toes)   S N LT FOOT (Includes Ankles or Toes)     ADDITIONAL NOTES:  Sterile dressings applied. Pt tolerated well.

## 2018-01-19 NOTE — FLOWSHEET NOTE
GI    After eating breakfast pt began repeating that his \"tummy hurts\", writer gave 4 mg zofran around 0815. Shortly after administered pt had large episode of emesis, undigested food. Pt was noted to eat and drink very fast, finishing whole cans of soda in 4-5 gulps. Pt encouraged to slow down when eating and drinking. Complete bed bath and linen change per ADNA/guard. Trauma resident Dr Lucia carrero served to update and request different antiemetic. Awaiting callback. 1430- around 1345 pt family calls for assist, pt had another episode of large amts emesis of undigested food substance. Finished eating lunch tray at least an hour prior to vomiting, and ate minimal amount of tray. Writer never rec'd call back from resident that was contacted for similar episode earlier today, and hasn't discussed case with other doctors thruout the shift. Trauma resident Dr Kyle Paula on unit, updated, order placed for IM phenergan. Pt cleaned up, linens changed, etc. Continues to verbalize \"tummy hurts\". Gave ordered phenergan and rest encouraged. Bowel sounds hypoactive thruout. Pt has stated few times today that he has to have BM, refusing to bear any weight on feet to get to [facility] and is unable to go on bedpan. Pt mother states he has never been able to go on a bedpan, updated that if he is not going to attempt ambulation or weight bearing then this is our only option for toileting at this time. Mental capacity limits being able to follow instruction on how to use arms to aid in transferring to a commode or use [employer]. Perfect serve sent to Dr Lucia Morton to update, pt is not on any PPI or other stomach medicine at this time.

## 2018-01-19 NOTE — PROGRESS NOTES
Pt is c/o of groin pain, burning sensation. He is frequently asking to void, voiding in small amounts (~50 ml), and having hesitancy with voiding. Bladder scan revealed 30 ml post void. Dr. Frankie Nunez notified. Spoke with patient's mother, and this seems to be routine for the pt. He very frequently asks to void. Will continue to monitor.

## 2018-01-20 VITALS
DIASTOLIC BLOOD PRESSURE: 55 MMHG | BODY MASS INDEX: 42.49 KG/M2 | HEART RATE: 78 BPM | TEMPERATURE: 98.1 F | OXYGEN SATURATION: 95 % | RESPIRATION RATE: 18 BRPM | WEIGHT: 248.9 LBS | HEIGHT: 64 IN | SYSTOLIC BLOOD PRESSURE: 158 MMHG

## 2018-01-20 PROCEDURE — 94762 N-INVAS EAR/PLS OXIMTRY CONT: CPT

## 2018-01-20 PROCEDURE — 1200000000 HC SEMI PRIVATE

## 2018-01-20 PROCEDURE — 6360000002 HC RX W HCPCS: Performed by: STUDENT IN AN ORGANIZED HEALTH CARE EDUCATION/TRAINING PROGRAM

## 2018-01-20 PROCEDURE — 6360000002 HC RX W HCPCS: Performed by: SURGERY

## 2018-01-20 PROCEDURE — 16020 DRESS/DEBRID P-THICK BURN S: CPT

## 2018-01-20 PROCEDURE — 6370000000 HC RX 637 (ALT 250 FOR IP): Performed by: STUDENT IN AN ORGANIZED HEALTH CARE EDUCATION/TRAINING PROGRAM

## 2018-01-20 PROCEDURE — 6370000000 HC RX 637 (ALT 250 FOR IP): Performed by: EMERGENCY MEDICINE

## 2018-01-20 PROCEDURE — 2500000003 HC RX 250 WO HCPCS: Performed by: EMERGENCY MEDICINE

## 2018-01-20 PROCEDURE — 2580000003 HC RX 258: Performed by: STUDENT IN AN ORGANIZED HEALTH CARE EDUCATION/TRAINING PROGRAM

## 2018-01-20 PROCEDURE — 6370000000 HC RX 637 (ALT 250 FOR IP): Performed by: NURSE PRACTITIONER

## 2018-01-20 RX ORDER — DIPHENHYDRAMINE HCL 25 MG
25 TABLET ORAL ONCE
Status: COMPLETED | OUTPATIENT
Start: 2018-01-20 | End: 2018-01-20

## 2018-01-20 RX ORDER — DOCUSATE SODIUM 100 MG/1
100 CAPSULE, LIQUID FILLED ORAL 2 TIMES DAILY PRN
Qty: 20 CAPSULE | Refills: 0 | Status: SHIPPED | OUTPATIENT
Start: 2018-01-20

## 2018-01-20 RX ORDER — OXYCODONE HYDROCHLORIDE 5 MG/1
5 TABLET ORAL EVERY 6 HOURS PRN
Qty: 28 TABLET | Refills: 0 | Status: SHIPPED | OUTPATIENT
Start: 2018-01-20 | End: 2018-01-27

## 2018-01-20 RX ORDER — LABETALOL HYDROCHLORIDE 5 MG/ML
10 INJECTION, SOLUTION INTRAVENOUS ONCE
Status: COMPLETED | OUTPATIENT
Start: 2018-01-20 | End: 2018-01-20

## 2018-01-20 RX ADMIN — SILVER SULFADIAZINE: 10 CREAM TOPICAL at 08:28

## 2018-01-20 RX ADMIN — OXCARBAZEPINE 600 MG: 300 TABLET, FILM COATED ORAL at 14:46

## 2018-01-20 RX ADMIN — ONDANSETRON 4 MG: 2 INJECTION INTRAMUSCULAR; INTRAVENOUS at 08:24

## 2018-01-20 RX ADMIN — METOPROLOL TARTRATE 12.5 MG: 25 TABLET ORAL at 08:25

## 2018-01-20 RX ADMIN — PROMETHAZINE HYDROCHLORIDE 6.25 MG: 25 INJECTION INTRAMUSCULAR; INTRAVENOUS at 00:42

## 2018-01-20 RX ADMIN — Medication 10 ML: at 08:28

## 2018-01-20 RX ADMIN — CETIRIZINE HYDROCHLORIDE 10 MG: 10 TABLET ORAL at 08:26

## 2018-01-20 RX ADMIN — OXYCODONE HYDROCHLORIDE 10 MG: 5 TABLET ORAL at 21:17

## 2018-01-20 RX ADMIN — DIVALPROEX SODIUM 250 MG: 250 TABLET, FILM COATED, EXTENDED RELEASE ORAL at 08:27

## 2018-01-20 RX ADMIN — BUSPIRONE HYDROCHLORIDE 30 MG: 15 TABLET ORAL at 20:40

## 2018-01-20 RX ADMIN — DIPHENHYDRAMINE HCL 25 MG: 25 TABLET ORAL at 02:56

## 2018-01-20 RX ADMIN — BUSPIRONE HYDROCHLORIDE 30 MG: 15 TABLET ORAL at 08:24

## 2018-01-20 RX ADMIN — ENOXAPARIN SODIUM 30 MG: 30 INJECTION SUBCUTANEOUS at 08:24

## 2018-01-20 RX ADMIN — QUETIAPINE FUMARATE 100 MG: 100 TABLET ORAL at 08:26

## 2018-01-20 RX ADMIN — DIVALPROEX SODIUM 1250 MG: 250 TABLET, FILM COATED, EXTENDED RELEASE ORAL at 17:19

## 2018-01-20 RX ADMIN — MORPHINE SULFATE 4 MG: 4 INJECTION INTRAVENOUS at 00:37

## 2018-01-20 RX ADMIN — OXYCODONE HYDROCHLORIDE 10 MG: 5 TABLET ORAL at 04:10

## 2018-01-20 RX ADMIN — QUETIAPINE FUMARATE 400 MG: 200 TABLET ORAL at 14:46

## 2018-01-20 RX ADMIN — ENALAPRIL MALEATE 10 MG: 10 TABLET ORAL at 08:25

## 2018-01-20 RX ADMIN — OXCARBAZEPINE 600 MG: 300 TABLET, FILM COATED ORAL at 08:24

## 2018-01-20 RX ADMIN — LABETALOL HYDROCHLORIDE 10 MG: 5 INJECTION, SOLUTION INTRAVENOUS at 03:59

## 2018-01-20 RX ADMIN — OXCARBAZEPINE 600 MG: 300 TABLET, FILM COATED ORAL at 20:40

## 2018-01-20 RX ADMIN — OXYCODONE HYDROCHLORIDE 10 MG: 5 TABLET ORAL at 09:55

## 2018-01-20 ASSESSMENT — PAIN SCALES - GENERAL
PAINLEVEL_OUTOF10: 7
PAINLEVEL_OUTOF10: 7

## 2018-01-20 NOTE — PROGRESS NOTES
Date Procedure started:  01/19/2018     Time Procedure started:  22:05     Location Completed:    X   Bedside     Tubbing Room  Medication Given:  Premedicated with 10mg Roxicodone and 25mcg Fentanyl given during dressing change       Photos Taken       No                                                      Please arianne dressing applied to OR debrided injuries/ skin to all areas that apply below:     S=Silvadene    B=Bacitracin    M= Mepilex    F= Furacin        HERRING=Santyl                             SUL=Sulfamylon     D=Donor site/xeroform    E=Eucerin    O=Other (specify below)  DRESSING APPLICATION/ CHANGE DEBRIDEMENT      (Y/N) BODY LOCATION   HEAD, FACE AND NECK         SCALP      RT EAR     LT EAR     NECK     FACE        CHEST     ABDOMEN     BACK     BUTTOCK     GENITALIA     PERINEUM        RT UPPER ARM (Includes Shoulder)     LT UPPER ARM (Includes Shoulder)     RT LOWER ARM (Includes Elbow or Wrist)     LT LOWER ARM (Includes Elbow or Wrist)     RT HAND (Includes Fingers)     LT HAND (Includes Fingers)        RT UPPER LEG (Includes Hip)     LT UPPER LEG (Includes Hip)     RT LOWER LEG (Includes Knee)     LT LOWER LEG (Includes Knee)   S N RT FOOT (Includes Ankles or Toes)   S N LT FOOT (Includes Ankles or Toes)     ADDITIONAL NOTES: Wounds washed with Hibaclens. Bilateral plantar surface of feet noted to be beefy red with no bleeding. AgSd sterile dressings applied to wounds, covered with DSD and secured with K-Rolls and ACE wraps. Patient tolerated procedure well with additional support from nursing staff.

## 2018-01-20 NOTE — CARE COORDINATION
Discharge [address] [facility]  Clinical Case Management Department  Written by:  Kaila Rob, RN    Patient Name: Traci Tidwell  Attending Provider: Jacqueline Malin DO  Admit Date: 2018  3:00 AM  MRN: [id number]  Account: [de-identified]                     : 1985  Discharge Date: 18      Disposition: home to [place] [id number],, RN
Earlier this am, met with Mr Feroz Willson' mother and Tacho Sweeney from [place].  rec'd list of snf in [place] area. Call to [facility] [place] and left message at 12:54. Call to [facility], spoke with Dorita Aldrich. She will speak with her director and call back. Call to [facility] [address], states they are a sister snf of [facility] and all the behavior go to that facility. Call to [address] they are all LTACH's. Rec'd call back from Dorita Aldrich, they are full and she stated they could not accept even if they had beds. rec'd call from Marquita Shannon, phone # for specific [facility]. Call to [facility]. Valentina directed writer to call Anastacia Graham at [phone]. Spoke with Arabella, attempted to fax demographics and progress notes. Arabella stated prob would not meet their admitting criteria. While faxing to [employer], rec'd call from Adan Maria, director of nursing at [facility]. She states can accept Frederic Hendrix back and meet all of his needs, have a RN during the day, a LPN 24 hrs. Can provide PT and anything else De La Garza Dies may need. Spoke with Thierry's mother, Pepe De Santiago. Informed of Difficulty in finding a Place to accept Frederic Dies. She states that did not surprise her since this happen one other time. No SNF would accept him. She states the state did have a meeting with [facility] and several people were put on administrative leave and the rep from the [facility] assured her that they can handle Thierry's needs. Pepe De Santiago now feels that returning to [place] is the best option. She states this will be least disruptive to him. She is requesting a hospital bed and wants to make sure that they are able to lift him into the W/C. Call to Adan Maria at [phone]. Adan Maria states they can get a hospital bed and will be able to lift in and out of W/C. Per nurse, no D/C this day due to projectile vomiting.   Will need ambulance when d/c
re:discharge planning. Spoke with staff member at bedside and [address] RN at [facility]. They will be able to accept pt back. They can accommodate pt in a wheelchair (can provide W/C for pt), they have nursing that can change dressings. Request paper Rx for facility to fill. Number to [phone]   Fax [phone].    [phone] spoke to pt's mom Beckie Ordonez [phone] at bedside. Plan is for pt to return to [address]. She will need to be notified when pt is discharged.         Electronically signed by Glenis Martin RN on 1/18/18 at 10:01 AM

## 2018-01-21 NOTE — PROGRESS NOTES
Patient discharged with documented belongings and group home  [facility]. Transport via [address] on stretcher.

## 2018-01-21 NOTE — ED NOTES
SW received call from [facility] Unit regarding patient transportation. Patient is from [place] [address] and was originally set up for [facility] was called and state they did not have patient scheduled. [facility] is attempting to get another transportation company to do transport, if unable they will have [facility] pick patient up.

## 2018-01-24 NOTE — H&P
TRAUMA HISTORY AND PHYSICAL EXAMINATION    PATIENT NAME: Magy Moyer  YOB: 1985  MEDICAL RECORD NO. [id number]   DATE: 1/24/2018  PRIMARY CARE PHYSICIAN: Rahel Hancock MD  PATIENT EVALUATED AT THE REQUEST OF : Case Zarate    ACTIVATION   []Trauma Alert     [] Trauma Priority     [x]Trauma Consult. IMPRESSION:     Patient Active Problem List   Diagnosis    Chronic mental illness    Frostbite of feet, bilateral       MEDICAL DECISION MAKING AND PLAN:     · Trauma Consult - Frostbite of feet  · Consult to Plastic Surgery - Debridement and dressing of bilateral plantar aspect of feet. F/u recs. · IVF  · General Diet  · Pain Control   · Sedation/Agitation control. CONSULT SERVICES    [] Neurosurgery     [] Orthopedic Surgery    [] Cardiothoracic     [] Facial Trauma    [x] Plastic Surgery (Burn)    [] Pediatric Surgery     [] Internal Medicine    [] Pulmonary Medicine    [] Other:        HISTORY:     SOURCE OF INFORMATION  Patient information was obtained from patient and caregiver / friend. History/Exam limitations: mental illness. INJURY SUMMARY  Extremity -  Frostbite w/ blistering of bilateral plantar aspect of feet. GENERAL DATA  Age 29 y.o.  male   Patient information was obtained from patient and caregiver / friend. History/Exam limitations: mental illness. Patient presented to the Emergency Department by ambulance. Injury Date: 1/24/2018   Approximate Injury Time: Over the course of 2 days. Transport mode:   [x]Ambulance      [] Helicopter     []Car       [] Other  Referring Hospital: [address] [place] [address], (e.g., home, farm, industry, street)  Specific Details of Location (e.g., bedroom, kitchen, garage):  Outside residence   Type of Residence (if occurred in home setting) (e.g., apartment, mobile home, single family home): [facility]    [] Motor Vehicle Collision   Specific vehicle type involved (e.g., sedan, minivan, SUV, pickup truck):   Collision with (e.g., type of vehicle, building, barn, ditch, tree):     Type of collision  [] Single Vehicle Collision  []Multiple Vehicle Collision  [] unknown collision type    Mechanism considerations  [] Fatality in Same Vehicle      []Ejected       []Rollover          []Extricated    Internal Compartment   []                      []Passenger:      []Front Seat        []Rear [address]  [] Unrestrained   []Lap Belt Only Restrained   [] Shoulder Belt Only Restrained  [] 3 Point Restrained  [] unknown     Air Bags  [] Front Air Bag  []Side Air Bag  []Other Air Bag []Air Bag Not Deployed      Pediatric Consideration:      [] Booster Seat  []Infant Car Seat  [] Child Car Seat      [] Motorcycle Collision   Wearing Helmet     []Yes     []No    []Unknown    [] Bicycle Collision Wearing Helmet     []Yes     []No    []Unknown    [] Pedestrian Struck         [] Fall    []From Standing     []From Height  Ft     []Down Stairs    [] Assault    [] Gunshot  Specify caliber / type of gun: ____________________________    [] [address] weapon type, size: _____________________________    [] Burn  []Flame   []Scald   []Electrical   []Chemical  []Inhalation   []House fire    [x] Other _Frostbite to feet bilaterally. _____________________    [] Other protective devices used / worn ___________________________    HISTORY:     Shannan Madrid is a 29 y.o. male with history of severe developmental delay and that presented to the Emergency Department following 2 episodes of cold exposure over the last 2 days. Pt was found outside 2 days ago by nursing staff without shoes on and suspected to have approximately 30 minutes of exposure. He was found outside again today and subsequently found to have difficulty walking. Nursing staff examined feed and found extensive blistering to the bilateral plantar aspect of the feet. No other areas noted to be involved.   Pt seen and evaluated

## 2018-01-30 ENCOUNTER — TELEPHONE (OUTPATIENT)
Dept: BURN CARE | Age: 33
End: 2018-01-30

## 2018-01-30 ENCOUNTER — OFFICE VISIT (OUTPATIENT)
Dept: BURN CARE | Age: 33
End: 2018-01-30
Payer: MEDICARE

## 2018-01-30 VITALS
SYSTOLIC BLOOD PRESSURE: 117 MMHG | DIASTOLIC BLOOD PRESSURE: 72 MMHG | HEIGHT: 64 IN | BODY MASS INDEX: 42.68 KG/M2 | WEIGHT: 250 LBS | HEART RATE: 92 BPM

## 2018-01-30 DIAGNOSIS — T33.821A FROSTBITE OF BOTH FEET, INITIAL ENCOUNTER: Primary | ICD-10-CM

## 2018-01-30 DIAGNOSIS — T33.822A FROSTBITE OF BOTH FEET, INITIAL ENCOUNTER: Primary | ICD-10-CM

## 2018-01-30 PROCEDURE — G8417 CALC BMI ABV UP PARAM F/U: HCPCS | Performed by: PLASTIC SURGERY

## 2018-01-30 PROCEDURE — G8427 DOCREV CUR MEDS BY ELIG CLIN: HCPCS | Performed by: PLASTIC SURGERY

## 2018-01-30 PROCEDURE — 1036F TOBACCO NON-USER: CPT | Performed by: PLASTIC SURGERY

## 2018-01-30 PROCEDURE — 1111F DSCHRG MED/CURRENT MED MERGE: CPT | Performed by: PLASTIC SURGERY

## 2018-01-30 PROCEDURE — 99203 OFFICE O/P NEW LOW 30 MIN: CPT

## 2018-01-30 PROCEDURE — G8484 FLU IMMUNIZE NO ADMIN: HCPCS | Performed by: PLASTIC SURGERY

## 2018-01-30 PROCEDURE — 99203 OFFICE O/P NEW LOW 30 MIN: CPT | Performed by: PLASTIC SURGERY

## 2018-01-30 RX ORDER — LORAZEPAM 1 MG/1
1 TABLET ORAL
COMMUNITY

## 2018-01-30 NOTE — PROGRESS NOTES
Subjective:      Patient ID: Darci Simmonds is a 29 y.o. male.     HPI    Review of Systems    Objective:   Physical Exam   Skin:            Assessment:       Wound #        1         2    Degree of Burn 2nd 2nd    Wound Appearance  Perimeter  Drainage  clean clean    Odor no no    Medication Now Using Silvadene Silvadene    Wound Care Dressing changes 2 x's daily     Old Dressing removed Removal of existing dressing  removal of existing dressing    Wound Debrided n/a N/a    Dressings dry   dry    Wound Size 5x3 LT foot 3x5 1/2 Rt foot                Plan:
kg/m²   Gen: Alert and oriented, NAD. Chest: Symmetric chest excursion, non labored breathing. Skin: Dillan-epithelialization present on plantar aspects of bilateral feet. No areas of necrosis. No erythema or edema. No drainage noted. No signs of infection. Assessment:   -29 y.o. male with frostbite to bilateral feet. Plan:  -D/C Silvadene. -Apply moisturizing cream and a dressing daily to bilateral feet.   -Weight bearing as tolerated to bilateral feet. -Follow up in clinic in 4 weeks. Desmond Palacios DPM PGY-2        Attending Physician Statement  I have discussed the case, including pertinent history and exam findings with the resident. I have seen and examined the patient personally and the key elements of all parts of the encounter have been performed by me. I agree with the assessment, plan and orders as documented by the resident.   Marisol Sheehan MD on1/30/2018 on 9:11 AM

## 2018-02-03 ENCOUNTER — APPOINTMENT (OUTPATIENT)
Dept: GENERAL RADIOLOGY | Age: 33
DRG: 872 | End: 2018-02-03
Payer: MEDICARE

## 2018-02-03 ENCOUNTER — HOSPITAL ENCOUNTER (INPATIENT)
Age: 33
LOS: 5 days | Discharge: HOME | DRG: 872 | End: 2018-02-08
Attending: INTERNAL MEDICINE | Admitting: INTERNAL MEDICINE
Payer: MEDICARE

## 2018-02-03 DIAGNOSIS — A41.9 SEPSIS, DUE TO UNSPECIFIED ORGANISM: Primary | ICD-10-CM

## 2018-02-03 DIAGNOSIS — N39.0 URINARY TRACT INFECTION WITHOUT HEMATURIA, SITE UNSPECIFIED: ICD-10-CM

## 2018-02-03 LAB
-: ABNORMAL
ABSOLUTE BANDS #: 0.18 K/UL (ref 0–1)
ABSOLUTE EOS #: 0 K/UL (ref 0–0.4)
ABSOLUTE IMMATURE GRANULOCYTE: ABNORMAL K/UL (ref 0–0.3)
ABSOLUTE LYMPH #: 1.42 K/UL (ref 1–4.8)
ABSOLUTE MONO #: 1.06 K/UL (ref 0–1)
ALBUMIN SERPL-MCNC: 3.7 G/DL (ref 3.5–5.2)
ALBUMIN/GLOBULIN RATIO: 1.2 (ref 1–2.5)
ALP BLD-CCNC: 47 U/L (ref 40–129)
ALT SERPL-CCNC: 21 U/L (ref 5–41)
AMORPHOUS: ABNORMAL
ANION GAP SERPL CALCULATED.3IONS-SCNC: 19 MMOL/L (ref 9–17)
AST SERPL-CCNC: 14 U/L
BACTERIA: ABNORMAL
BANDS: 1 % (ref 0–10)
BASOPHILS # BLD: 0 % (ref 0–2)
BASOPHILS ABSOLUTE: 0 K/UL (ref 0–0.2)
BILIRUB SERPL-MCNC: 0.47 MG/DL (ref 0.3–1.2)
BILIRUBIN URINE: ABNORMAL
BUN BLDV-MCNC: 27 MG/DL (ref 6–20)
BUN/CREAT BLD: 10 (ref 9–20)
CALCIUM SERPL-MCNC: 9.1 MG/DL (ref 8.6–10.4)
CASTS UA: ABNORMAL /LPF
CHLORIDE BLD-SCNC: 95 MMOL/L (ref 98–107)
CO2: 21 MMOL/L (ref 20–31)
COLOR: YELLOW
COMMENT UA: ABNORMAL
CREAT SERPL-MCNC: 2.82 MG/DL (ref 0.7–1.2)
CRYSTALS, UA: ABNORMAL /HPF
DIFFERENTIAL TYPE: ABNORMAL
DIRECT EXAM: NORMAL
EKG ATRIAL RATE: 100 BPM
EKG P AXIS: 43 DEGREES
EKG P-R INTERVAL: 182 MS
EKG Q-T INTERVAL: 358 MS
EKG QRS DURATION: 98 MS
EKG QTC CALCULATION (BAZETT): 461 MS
EKG R AXIS: 34 DEGREES
EKG T AXIS: 5 DEGREES
EKG VENTRICULAR RATE: 100 BPM
EOSINOPHILS RELATIVE PERCENT: 0 % (ref 0–8)
EPITHELIAL CELLS UA: ABNORMAL /HPF (ref 0–5)
GFR AFRICAN AMERICAN: 32 ML/MIN
GFR NON-AFRICAN AMERICAN: 27 ML/MIN
GFR SERPL CREATININE-BSD FRML MDRD: ABNORMAL ML/MIN/{1.73_M2}
GFR SERPL CREATININE-BSD FRML MDRD: ABNORMAL ML/MIN/{1.73_M2}
GLUCOSE BLD-MCNC: 163 MG/DL (ref 70–99)
GLUCOSE URINE: ABNORMAL
HCT VFR BLD CALC: 40.3 % (ref 41–53)
HEMOGLOBIN: 13.3 G/DL (ref 13.5–17)
IMMATURE GRANULOCYTES: ABNORMAL %
KETONES, URINE: NEGATIVE
LACTIC ACID, WHOLE BLOOD: ABNORMAL MMOL/L (ref 0.7–2.1)
LACTIC ACID: 1.1 MMOL/L (ref 0.5–2.2)
LACTIC ACID: 2.6 MMOL/L (ref 0.5–2.2)
LEUKOCYTE ESTERASE, URINE: ABNORMAL
LYMPHOCYTES # BLD: 8 % (ref 24–44)
Lab: NORMAL
MCH RBC QN AUTO: 27.9 PG (ref 26–34)
MCHC RBC AUTO-ENTMCNC: 32.9 G/DL (ref 31–37)
MCV RBC AUTO: 84.9 FL (ref 80–100)
MONOCYTES # BLD: 6 % (ref 0–12)
MORPHOLOGY: NORMAL
MUCUS: ABNORMAL
NITRITE, URINE: POSITIVE
NRBC AUTOMATED: ABNORMAL PER 100 WBC
OTHER OBSERVATIONS UA: ABNORMAL
PDW BLD-RTO: 14.7 % (ref 12.1–15.2)
PH UA: 7.5 (ref 5–9)
PLATELET # BLD: 209 K/UL (ref 140–450)
PLATELET ESTIMATE: ABNORMAL
PMV BLD AUTO: 8.1 FL (ref 6–12)
POTASSIUM SERPL-SCNC: 4.1 MMOL/L (ref 3.7–5.3)
PROTEIN UA: ABNORMAL
RBC # BLD: 4.75 M/UL (ref 4.5–5.9)
RBC # BLD: ABNORMAL 10*6/UL
RBC UA: ABNORMAL /HPF (ref 0–2)
RENAL EPITHELIAL, UA: ABNORMAL /HPF
SEG NEUTROPHILS: 85 % (ref 36–66)
SEGMENTED NEUTROPHILS ABSOLUTE COUNT: 15.04 K/UL (ref 1.8–7.7)
SODIUM BLD-SCNC: 135 MMOL/L (ref 135–144)
SPECIFIC GRAVITY UA: 1.02 (ref 1.01–1.02)
SPECIMEN DESCRIPTION: NORMAL
STATUS: NORMAL
TOTAL PROTEIN: 6.7 G/DL (ref 6.4–8.3)
TRICHOMONAS: ABNORMAL
TROPONIN INTERP: ABNORMAL
TROPONIN T: 0.04 NG/ML
TURBIDITY: CLEAR
URINE HGB: ABNORMAL
UROBILINOGEN, URINE: NORMAL
WBC # BLD: 17.7 K/UL (ref 3.5–11)
WBC # BLD: ABNORMAL 10*3/UL
WBC UA: ABNORMAL /HPF (ref 0–5)
YEAST: ABNORMAL

## 2018-02-03 PROCEDURE — 93005 ELECTROCARDIOGRAM TRACING: CPT

## 2018-02-03 PROCEDURE — 96374 THER/PROPH/DIAG INJ IV PUSH: CPT

## 2018-02-03 PROCEDURE — 6370000000 HC RX 637 (ALT 250 FOR IP): Performed by: PHYSICIAN ASSISTANT

## 2018-02-03 PROCEDURE — 6370000000 HC RX 637 (ALT 250 FOR IP): Performed by: INTERNAL MEDICINE

## 2018-02-03 PROCEDURE — 71045 X-RAY EXAM CHEST 1 VIEW: CPT

## 2018-02-03 PROCEDURE — 85025 COMPLETE CBC W/AUTO DIFF WBC: CPT

## 2018-02-03 PROCEDURE — 87804 INFLUENZA ASSAY W/OPTIC: CPT

## 2018-02-03 PROCEDURE — 36415 COLL VENOUS BLD VENIPUNCTURE: CPT

## 2018-02-03 PROCEDURE — 87186 SC STD MICRODIL/AGAR DIL: CPT

## 2018-02-03 PROCEDURE — 99285 EMERGENCY DEPT VISIT HI MDM: CPT

## 2018-02-03 PROCEDURE — 87149 DNA/RNA DIRECT PROBE: CPT

## 2018-02-03 PROCEDURE — 96375 TX/PRO/DX INJ NEW DRUG ADDON: CPT

## 2018-02-03 PROCEDURE — 83605 ASSAY OF LACTIC ACID: CPT

## 2018-02-03 PROCEDURE — 81001 URINALYSIS AUTO W/SCOPE: CPT

## 2018-02-03 PROCEDURE — 1200000000 HC SEMI PRIVATE

## 2018-02-03 PROCEDURE — 87040 BLOOD CULTURE FOR BACTERIA: CPT

## 2018-02-03 PROCEDURE — 2580000003 HC RX 258: Performed by: PHYSICIAN ASSISTANT

## 2018-02-03 PROCEDURE — 80053 COMPREHEN METABOLIC PANEL: CPT

## 2018-02-03 PROCEDURE — 87205 SMEAR GRAM STAIN: CPT

## 2018-02-03 PROCEDURE — 6360000002 HC RX W HCPCS: Performed by: PHYSICIAN ASSISTANT

## 2018-02-03 PROCEDURE — 87086 URINE CULTURE/COLONY COUNT: CPT

## 2018-02-03 PROCEDURE — 2580000003 HC RX 258: Performed by: INTERNAL MEDICINE

## 2018-02-03 PROCEDURE — 84484 ASSAY OF TROPONIN QUANT: CPT

## 2018-02-03 PROCEDURE — 87088 URINE BACTERIA CULTURE: CPT

## 2018-02-03 RX ORDER — SODIUM CHLORIDE 9 MG/ML
INJECTION, SOLUTION INTRAVENOUS CONTINUOUS
Status: DISCONTINUED | OUTPATIENT
Start: 2018-02-03 | End: 2018-02-08 | Stop reason: HOSPADM

## 2018-02-03 RX ORDER — QUETIAPINE FUMARATE 300 MG/1
300 TABLET, FILM COATED ORAL NIGHTLY
COMMUNITY

## 2018-02-03 RX ORDER — BUSPIRONE HYDROCHLORIDE 5 MG/1
30 TABLET ORAL 2 TIMES DAILY
Status: DISCONTINUED | OUTPATIENT
Start: 2018-02-03 | End: 2018-02-08 | Stop reason: HOSPADM

## 2018-02-03 RX ORDER — CEFTRIAXONE 1 G/1
INJECTION, POWDER, FOR SOLUTION INTRAMUSCULAR; INTRAVENOUS
Status: DISPENSED
Start: 2018-02-03 | End: 2018-02-04

## 2018-02-03 RX ORDER — QUETIAPINE FUMARATE 100 MG/1
100 TABLET, FILM COATED ORAL 2 TIMES DAILY
Status: DISCONTINUED | OUTPATIENT
Start: 2018-02-03 | End: 2018-02-08 | Stop reason: HOSPADM

## 2018-02-03 RX ORDER — ACETAMINOPHEN 500 MG
1000 TABLET ORAL ONCE
Status: COMPLETED | OUTPATIENT
Start: 2018-02-03 | End: 2018-02-03

## 2018-02-03 RX ORDER — OXCARBAZEPINE 300 MG/1
600 TABLET, FILM COATED ORAL 3 TIMES DAILY
Status: DISCONTINUED | OUTPATIENT
Start: 2018-02-03 | End: 2018-02-08 | Stop reason: HOSPADM

## 2018-02-03 RX ORDER — DIVALPROEX SODIUM 125 MG/1
250 TABLET, DELAYED RELEASE ORAL
Status: DISCONTINUED | OUTPATIENT
Start: 2018-02-04 | End: 2018-02-05

## 2018-02-03 RX ORDER — ACETAMINOPHEN 325 MG/1
650 TABLET ORAL EVERY 4 HOURS PRN
Status: DISCONTINUED | OUTPATIENT
Start: 2018-02-03 | End: 2018-02-08 | Stop reason: HOSPADM

## 2018-02-03 RX ORDER — PROMETHAZINE HYDROCHLORIDE 25 MG/1
25 SUPPOSITORY RECTAL EVERY 6 HOURS PRN
Status: DISCONTINUED | OUTPATIENT
Start: 2018-02-03 | End: 2018-02-08 | Stop reason: HOSPADM

## 2018-02-03 RX ORDER — LORATADINE 10 MG/1
10 TABLET ORAL DAILY PRN
Status: DISCONTINUED | OUTPATIENT
Start: 2018-02-03 | End: 2018-02-03 | Stop reason: CLARIF

## 2018-02-03 RX ORDER — QUETIAPINE FUMARATE 100 MG/1
500 TABLET, FILM COATED ORAL
Status: DISCONTINUED | OUTPATIENT
Start: 2018-02-03 | End: 2018-02-08 | Stop reason: HOSPADM

## 2018-02-03 RX ORDER — DIVALPROEX SODIUM 125 MG/1
375 TABLET, DELAYED RELEASE ORAL EVERY MORNING
COMMUNITY

## 2018-02-03 RX ORDER — 0.9 % SODIUM CHLORIDE 0.9 %
30 INTRAVENOUS SOLUTION INTRAVENOUS ONCE
Status: COMPLETED | OUTPATIENT
Start: 2018-02-03 | End: 2018-02-03

## 2018-02-03 RX ORDER — MORPHINE SULFATE 2 MG/ML
2 INJECTION, SOLUTION INTRAMUSCULAR; INTRAVENOUS ONCE
Status: COMPLETED | OUTPATIENT
Start: 2018-02-03 | End: 2018-02-03

## 2018-02-03 RX ORDER — DOCUSATE SODIUM 100 MG/1
100 CAPSULE, LIQUID FILLED ORAL 2 TIMES DAILY PRN
Status: DISCONTINUED | OUTPATIENT
Start: 2018-02-03 | End: 2018-02-08 | Stop reason: HOSPADM

## 2018-02-03 RX ORDER — LORAZEPAM 1 MG/1
1 TABLET ORAL EVERY 4 HOURS PRN
Status: DISCONTINUED | OUTPATIENT
Start: 2018-02-03 | End: 2018-02-08 | Stop reason: HOSPADM

## 2018-02-03 RX ORDER — CETIRIZINE HYDROCHLORIDE 10 MG/1
10 TABLET ORAL DAILY
Status: DISCONTINUED | OUTPATIENT
Start: 2018-02-03 | End: 2018-02-08 | Stop reason: HOSPADM

## 2018-02-03 RX ORDER — ACETAMINOPHEN 650 MG/1
650 SUPPOSITORY RECTAL EVERY 4 HOURS PRN
COMMUNITY

## 2018-02-03 RX ORDER — DIVALPROEX SODIUM 125 MG/1
375 TABLET, DELAYED RELEASE ORAL EVERY MORNING
Status: DISCONTINUED | OUTPATIENT
Start: 2018-02-04 | End: 2018-02-08 | Stop reason: HOSPADM

## 2018-02-03 RX ORDER — ONDANSETRON 2 MG/ML
4 INJECTION INTRAMUSCULAR; INTRAVENOUS ONCE
Status: COMPLETED | OUTPATIENT
Start: 2018-02-03 | End: 2018-02-03

## 2018-02-03 RX ORDER — ENALAPRIL MALEATE 10 MG/1
10 TABLET ORAL DAILY
Status: DISCONTINUED | OUTPATIENT
Start: 2018-02-03 | End: 2018-02-08 | Stop reason: HOSPADM

## 2018-02-03 RX ORDER — WATER 1000 ML/1000ML
10 INJECTION, SOLUTION INTRAVENOUS ONCE
Status: DISCONTINUED | OUTPATIENT
Start: 2018-02-03 | End: 2018-02-03 | Stop reason: RX

## 2018-02-03 RX ORDER — PROMETHAZINE HYDROCHLORIDE 25 MG/1
25 SUPPOSITORY RECTAL EVERY 6 HOURS PRN
COMMUNITY

## 2018-02-03 RX ORDER — IBUPROFEN 600 MG/1
600 TABLET ORAL EVERY 6 HOURS PRN
COMMUNITY

## 2018-02-03 RX ADMIN — ACETAMINOPHEN 1000 MG: 500 TABLET ORAL at 16:55

## 2018-02-03 RX ADMIN — ONDANSETRON 4 MG: 2 INJECTION INTRAMUSCULAR; INTRAVENOUS at 19:46

## 2018-02-03 RX ADMIN — DIVALPROEX SODIUM 1250 MG: 250 TABLET, DELAYED RELEASE ORAL at 21:52

## 2018-02-03 RX ADMIN — SODIUM CHLORIDE 3390 ML: 9 INJECTION, SOLUTION INTRAVENOUS at 16:55

## 2018-02-03 RX ADMIN — METOPROLOL TARTRATE 12.5 MG: 25 TABLET ORAL at 21:52

## 2018-02-03 RX ADMIN — MORPHINE SULFATE 2 MG: 2 INJECTION, SOLUTION INTRAMUSCULAR; INTRAVENOUS at 19:47

## 2018-02-03 RX ADMIN — WATER 1 G: 1 INJECTION INTRAMUSCULAR; INTRAVENOUS; SUBCUTANEOUS at 19:51

## 2018-02-03 RX ADMIN — OXCARBAZEPINE 600 MG: 300 TABLET, FILM COATED ORAL at 21:51

## 2018-02-03 RX ADMIN — SODIUM CHLORIDE: 9 INJECTION, SOLUTION INTRAVENOUS at 22:56

## 2018-02-03 RX ADMIN — BUSPIRONE HYDROCHLORIDE 30 MG: 5 TABLET ORAL at 21:51

## 2018-02-03 RX ADMIN — QUETIAPINE FUMARATE 100 MG: 100 TABLET ORAL at 21:52

## 2018-02-03 ASSESSMENT — ENCOUNTER SYMPTOMS
DIARRHEA: 0
WHEEZING: 0
EYE DISCHARGE: 0
NAUSEA: 1
BACK PAIN: 0
TROUBLE SWALLOWING: 0
EYE PAIN: 0
ABDOMINAL PAIN: 0
COUGH: 1
VOMITING: 1
RHINORRHEA: 0
SINUS PRESSURE: 0

## 2018-02-03 NOTE — ED NOTES
Pt in bathroom attempting urine specimen. Krupa Mensah made aware that BP is low--a manual BP will be taken when pt returns to room. Direct Care Staff is with pt.       Radha Nickerson RN  02/03/18 5626

## 2018-02-03 NOTE — ED NOTES
Pt's mother called for update on pt. Mother advised that we are just starting to get results back. Mother stated that she will check with [facility] Nursing staff for updates.       Umu Montesinos RN  02/03/18 1901

## 2018-02-04 PROCEDURE — 2580000003 HC RX 258: Performed by: INTERNAL MEDICINE

## 2018-02-04 PROCEDURE — 6370000000 HC RX 637 (ALT 250 FOR IP): Performed by: INTERNAL MEDICINE

## 2018-02-04 PROCEDURE — 1200000000 HC SEMI PRIVATE

## 2018-02-04 PROCEDURE — 6360000002 HC RX W HCPCS: Performed by: INTERNAL MEDICINE

## 2018-02-04 RX ORDER — IBUPROFEN 800 MG/1
800 TABLET ORAL EVERY 8 HOURS PRN
Status: DISCONTINUED | OUTPATIENT
Start: 2018-02-04 | End: 2018-02-08 | Stop reason: HOSPADM

## 2018-02-04 RX ORDER — DIPHENOXYLATE HYDROCHLORIDE AND ATROPINE SULFATE 2.5; .025 MG/1; MG/1
1 TABLET ORAL 4 TIMES DAILY PRN
Status: DISCONTINUED | OUTPATIENT
Start: 2018-02-04 | End: 2018-02-08 | Stop reason: HOSPADM

## 2018-02-04 RX ADMIN — ACETAMINOPHEN 650 MG: 325 TABLET ORAL at 12:36

## 2018-02-04 RX ADMIN — METOPROLOL TARTRATE 12.5 MG: 25 TABLET ORAL at 21:27

## 2018-02-04 RX ADMIN — OXCARBAZEPINE 600 MG: 300 TABLET, FILM COATED ORAL at 14:11

## 2018-02-04 RX ADMIN — DIPHENOXYLATE HYDROCHLORIDE AND ATROPINE SULFATE 1 TABLET: 2.5; .025 TABLET ORAL at 19:33

## 2018-02-04 RX ADMIN — DIVALPROEX SODIUM 375 MG: 125 TABLET, DELAYED RELEASE ORAL at 08:37

## 2018-02-04 RX ADMIN — CETIRIZINE HYDROCHLORIDE 10 MG: 10 TABLET, FILM COATED ORAL at 08:34

## 2018-02-04 RX ADMIN — BUSPIRONE HYDROCHLORIDE 30 MG: 5 TABLET ORAL at 08:34

## 2018-02-04 RX ADMIN — OXCARBAZEPINE 600 MG: 300 TABLET, FILM COATED ORAL at 08:34

## 2018-02-04 RX ADMIN — SODIUM CHLORIDE: 9 INJECTION, SOLUTION INTRAVENOUS at 03:39

## 2018-02-04 RX ADMIN — ACETAMINOPHEN 650 MG: 325 TABLET ORAL at 03:29

## 2018-02-04 RX ADMIN — DIVALPROEX SODIUM 1250 MG: 250 TABLET, DELAYED RELEASE ORAL at 21:26

## 2018-02-04 RX ADMIN — Medication 1 G: at 19:34

## 2018-02-04 RX ADMIN — BUSPIRONE HYDROCHLORIDE 30 MG: 5 TABLET ORAL at 21:26

## 2018-02-04 RX ADMIN — QUETIAPINE FUMARATE 100 MG: 100 TABLET ORAL at 21:26

## 2018-02-04 RX ADMIN — SODIUM CHLORIDE: 9 INJECTION, SOLUTION INTRAVENOUS at 08:40

## 2018-02-04 RX ADMIN — ACETAMINOPHEN 650 MG: 325 TABLET ORAL at 07:32

## 2018-02-04 RX ADMIN — SODIUM CHLORIDE: 9 INJECTION, SOLUTION INTRAVENOUS at 23:41

## 2018-02-04 RX ADMIN — OXCARBAZEPINE 600 MG: 300 TABLET, FILM COATED ORAL at 21:26

## 2018-02-04 RX ADMIN — DIVALPROEX SODIUM 250 MG: 125 TABLET, DELAYED RELEASE ORAL at 08:34

## 2018-02-04 RX ADMIN — QUETIAPINE FUMARATE 500 MG: 100 TABLET, FILM COATED ORAL at 12:36

## 2018-02-04 RX ADMIN — ENALAPRIL MALEATE 10 MG: 10 TABLET ORAL at 08:34

## 2018-02-04 RX ADMIN — IBUPROFEN 800 MG: 800 TABLET ORAL at 14:11

## 2018-02-04 RX ADMIN — PROMETHAZINE HYDROCHLORIDE 25 MG: 25 SUPPOSITORY RECTAL at 03:17

## 2018-02-04 RX ADMIN — QUETIAPINE FUMARATE 100 MG: 100 TABLET ORAL at 08:37

## 2018-02-04 RX ADMIN — METOPROLOL TARTRATE 12.5 MG: 25 TABLET ORAL at 08:33

## 2018-02-04 RX ADMIN — SODIUM CHLORIDE: 9 INJECTION, SOLUTION INTRAVENOUS at 13:42

## 2018-02-04 RX ADMIN — SODIUM CHLORIDE: 9 INJECTION, SOLUTION INTRAVENOUS at 19:34

## 2018-02-04 ASSESSMENT — PAIN SCALES - GENERAL
PAINLEVEL_OUTOF10: 0

## 2018-02-04 NOTE — H&P
reports that he does not drink alcohol. OCCUPATION: not employed    Allergies:  Abilify [aripiprazole] and Lithium    Medications Prior to Admission:    Prior to Admission medications    Medication Sig Start Date End Date Taking? Authorizing Provider   divalproex (DEPAKOTE) 125 MG DR tablet Take 375 mg by mouth every morning Pt is to take 125mg and a 250mg tablet every morning   Yes Historical Provider, MD   QUEtiapine (SEROQUEL) 100 MG tablet Take 100 mg by mouth 2 times daily   Yes Historical Provider, MD   promethazine (PHENERGAN) 25 MG suppository Place 25 mg rectally every 6 hours as needed for Nausea   Yes Historical Provider, MD   acetaminophen (TYLENOL) 650 MG suppository Place 650 mg rectally every 4 hours as needed for Fever   Yes Historical Provider, MD   ibuprofen (ADVIL;MOTRIN) 600 MG tablet Take 600 mg by mouth every 6 hours as needed for Fever   Yes Historical Provider, MD   divalproex (DEPAKOTE) 250 MG DR tablet Take 250 mg by mouth daily (with breakfast)   Yes Historical Provider, MD   divalproex (DEPAKOTE) 250 MG DR tablet Take 1,250 mg by mouth nightly   Yes Historical Provider, MD   metoprolol tartrate (LOPRESSOR) 25 MG tablet Take 12.5 mg by mouth 2 times daily   Yes Historical Provider, MD   OXcarbazepine (TRILEPTAL) 600 MG tablet Take 600 mg by mouth 3 times daily   Yes Historical Provider, MD   QUEtiapine (SEROQUEL) 200 MG tablet Take 500 mg by mouth Daily with lunch    Yes Historical Provider, MD   acetaminophen (TYLENOL) 325 MG tablet Take 650 mg by mouth every 4 hours as needed for Pain or Fever   Yes Historical Provider, MD   busPIRone (BUSPAR) 30 MG tablet take 1 tablet by mouth twice a day 11/6/16  Yes Historical Provider, MD   enalapril (VASOTEC) 10 MG tablet Take 10 mg by mouth daily    Yes Historical Provider, MD   LORazepam (ATIVAN) 1 MG tablet Take 1 mg by mouth. Historical Provider, MD   silver sulfADIAZINE (SILVADENE) 1 % cream Apply topically daily.  1/20/18   Lord Allyn DO

## 2018-02-05 PROBLEM — T33.821A FROSTBITE OF BOTH FEET: Status: RESOLVED | Noted: 2018-01-18 | Resolved: 2018-02-05

## 2018-02-05 PROBLEM — N39.0 E. COLI UTI: Status: ACTIVE | Noted: 2018-02-05

## 2018-02-05 PROBLEM — E87.20 LACTIC ACIDOSIS: Status: ACTIVE | Noted: 2018-02-05

## 2018-02-05 PROBLEM — R78.81 E COLI BACTEREMIA: Status: ACTIVE | Noted: 2018-02-05

## 2018-02-05 PROBLEM — B96.20 E. COLI UTI: Status: ACTIVE | Noted: 2018-02-05

## 2018-02-05 PROBLEM — T33.822A FROSTBITE OF BOTH FEET: Status: RESOLVED | Noted: 2018-01-18 | Resolved: 2018-02-05

## 2018-02-05 PROBLEM — T33.822A FROSTBITE OF BOTH FEET: Status: ACTIVE | Noted: 2018-01-18

## 2018-02-05 PROBLEM — B96.20 E COLI BACTEREMIA: Status: ACTIVE | Noted: 2018-02-05

## 2018-02-05 PROBLEM — T33.821A FROSTBITE OF BOTH FEET: Status: ACTIVE | Noted: 2018-01-18

## 2018-02-05 PROBLEM — N17.9 ACUTE KIDNEY INJURY (HCC): Status: ACTIVE | Noted: 2018-02-05

## 2018-02-05 LAB
ABSOLUTE EOS #: 0.1 K/UL (ref 0–0.4)
ABSOLUTE IMMATURE GRANULOCYTE: ABNORMAL K/UL (ref 0–0.3)
ABSOLUTE LYMPH #: 0.9 K/UL (ref 1–4.8)
ABSOLUTE MONO #: 1.3 K/UL (ref 0–1)
ANION GAP SERPL CALCULATED.3IONS-SCNC: 13 MMOL/L (ref 9–17)
BASOPHILS # BLD: 0 % (ref 0–2)
BASOPHILS ABSOLUTE: 0 K/UL (ref 0–0.2)
BUN BLDV-MCNC: 16 MG/DL (ref 6–20)
BUN/CREAT BLD: 14 (ref 9–20)
CALCIUM SERPL-MCNC: 8.4 MG/DL (ref 8.6–10.4)
CHLORIDE BLD-SCNC: 109 MMOL/L (ref 98–107)
CO2: 18 MMOL/L (ref 20–31)
CREAT SERPL-MCNC: 1.14 MG/DL (ref 0.7–1.2)
CULTURE: ABNORMAL
DIFFERENTIAL TYPE: ABNORMAL
EOSINOPHILS RELATIVE PERCENT: 1 % (ref 0–8)
GFR AFRICAN AMERICAN: >60 ML/MIN
GFR NON-AFRICAN AMERICAN: >60 ML/MIN
GFR SERPL CREATININE-BSD FRML MDRD: ABNORMAL ML/MIN/{1.73_M2}
GFR SERPL CREATININE-BSD FRML MDRD: ABNORMAL ML/MIN/{1.73_M2}
GLUCOSE BLD-MCNC: 143 MG/DL (ref 70–99)
HCT VFR BLD CALC: 32.9 % (ref 41–53)
HEMOGLOBIN: 10.8 G/DL (ref 13.5–17)
IMMATURE GRANULOCYTES: ABNORMAL %
LYMPHOCYTES # BLD: 8 % (ref 24–44)
Lab: ABNORMAL
MCH RBC QN AUTO: 28.1 PG (ref 26–34)
MCHC RBC AUTO-ENTMCNC: 32.8 G/DL (ref 31–37)
MCV RBC AUTO: 85.5 FL (ref 80–100)
MONOCYTES # BLD: 11 % (ref 0–12)
NRBC AUTOMATED: ABNORMAL PER 100 WBC
ORGANISM: ABNORMAL
ORGANISM: ABNORMAL
PDW BLD-RTO: 16.1 % (ref 12.1–15.2)
PLATELET # BLD: 162 K/UL (ref 140–450)
PLATELET ESTIMATE: ABNORMAL
PMV BLD AUTO: 8.2 FL (ref 6–12)
POTASSIUM SERPL-SCNC: 3.9 MMOL/L (ref 3.7–5.3)
RBC # BLD: 3.85 M/UL (ref 4.5–5.9)
RBC # BLD: ABNORMAL 10*6/UL
SEG NEUTROPHILS: 80 % (ref 36–66)
SEGMENTED NEUTROPHILS ABSOLUTE COUNT: 9.3 K/UL (ref 1.8–7.7)
SODIUM BLD-SCNC: 140 MMOL/L (ref 135–144)
SPECIMEN DESCRIPTION: ABNORMAL
STATUS: ABNORMAL
STATUS: ABNORMAL
VALPROIC ACID LEVEL: 92 UG/ML (ref 50–125)
VALPROIC ACID, FREE: 20.6 UG/ML (ref 7–23)
VALPROIC DATE LAST DOSE: NORMAL
VALPROIC DOSE AMOUNT: NORMAL
VALPROIC TIME LAST DOSE: NORMAL
WBC # BLD: 11.6 K/UL (ref 3.5–11)
WBC # BLD: ABNORMAL 10*3/UL

## 2018-02-05 PROCEDURE — 2580000003 HC RX 258: Performed by: INTERNAL MEDICINE

## 2018-02-05 PROCEDURE — 99221 1ST HOSP IP/OBS SF/LOW 40: CPT | Performed by: PODIATRIST

## 2018-02-05 PROCEDURE — 1200000000 HC SEMI PRIVATE

## 2018-02-05 PROCEDURE — 85025 COMPLETE CBC W/AUTO DIFF WBC: CPT

## 2018-02-05 PROCEDURE — 6370000000 HC RX 637 (ALT 250 FOR IP): Performed by: INTERNAL MEDICINE

## 2018-02-05 PROCEDURE — 36415 COLL VENOUS BLD VENIPUNCTURE: CPT

## 2018-02-05 PROCEDURE — 80165 DIPROPYLACETIC ACID FREE: CPT

## 2018-02-05 PROCEDURE — 80048 BASIC METABOLIC PNL TOTAL CA: CPT

## 2018-02-05 PROCEDURE — 80164 ASSAY DIPROPYLACETIC ACD TOT: CPT

## 2018-02-05 PROCEDURE — 6360000002 HC RX W HCPCS: Performed by: INTERNAL MEDICINE

## 2018-02-05 RX ORDER — ONDANSETRON 2 MG/ML
4 INJECTION INTRAMUSCULAR; INTRAVENOUS EVERY 6 HOURS PRN
Status: DISCONTINUED | OUTPATIENT
Start: 2018-02-05 | End: 2018-02-08 | Stop reason: HOSPADM

## 2018-02-05 RX ADMIN — SODIUM CHLORIDE: 9 INJECTION, SOLUTION INTRAVENOUS at 15:05

## 2018-02-05 RX ADMIN — QUETIAPINE FUMARATE 100 MG: 100 TABLET ORAL at 09:13

## 2018-02-05 RX ADMIN — BUSPIRONE HYDROCHLORIDE 30 MG: 5 TABLET ORAL at 09:13

## 2018-02-05 RX ADMIN — CETIRIZINE HYDROCHLORIDE 10 MG: 10 TABLET, FILM COATED ORAL at 09:13

## 2018-02-05 RX ADMIN — ACETAMINOPHEN 650 MG: 325 TABLET ORAL at 07:27

## 2018-02-05 RX ADMIN — QUETIAPINE FUMARATE 100 MG: 100 TABLET ORAL at 20:45

## 2018-02-05 RX ADMIN — ONDANSETRON 4 MG: 2 INJECTION INTRAMUSCULAR; INTRAVENOUS at 19:59

## 2018-02-05 RX ADMIN — ACETAMINOPHEN 650 MG: 325 TABLET ORAL at 20:44

## 2018-02-05 RX ADMIN — ACETAMINOPHEN 650 MG: 325 TABLET ORAL at 15:00

## 2018-02-05 RX ADMIN — OXCARBAZEPINE 600 MG: 300 TABLET, FILM COATED ORAL at 20:45

## 2018-02-05 RX ADMIN — SODIUM CHLORIDE: 9 INJECTION, SOLUTION INTRAVENOUS at 04:36

## 2018-02-05 RX ADMIN — ENALAPRIL MALEATE 10 MG: 10 TABLET ORAL at 09:13

## 2018-02-05 RX ADMIN — DIPHENOXYLATE HYDROCHLORIDE AND ATROPINE SULFATE 1 TABLET: 2.5; .025 TABLET ORAL at 10:01

## 2018-02-05 RX ADMIN — Medication 1 G: at 20:46

## 2018-02-05 RX ADMIN — DIVALPROEX SODIUM 1250 MG: 250 TABLET, DELAYED RELEASE ORAL at 20:46

## 2018-02-05 RX ADMIN — PROMETHAZINE HYDROCHLORIDE 25 MG: 25 SUPPOSITORY RECTAL at 12:52

## 2018-02-05 RX ADMIN — METOPROLOL TARTRATE 25 MG: 25 TABLET ORAL at 20:46

## 2018-02-05 RX ADMIN — OXCARBAZEPINE 600 MG: 300 TABLET, FILM COATED ORAL at 15:00

## 2018-02-05 RX ADMIN — DIVALPROEX SODIUM 375 MG: 125 TABLET, DELAYED RELEASE ORAL at 09:12

## 2018-02-05 RX ADMIN — METOPROLOL TARTRATE 25 MG: 25 TABLET ORAL at 09:13

## 2018-02-05 RX ADMIN — SODIUM CHLORIDE: 9 INJECTION, SOLUTION INTRAVENOUS at 10:01

## 2018-02-05 RX ADMIN — SODIUM CHLORIDE: 9 INJECTION, SOLUTION INTRAVENOUS at 19:59

## 2018-02-05 RX ADMIN — BUSPIRONE HYDROCHLORIDE 30 MG: 5 TABLET ORAL at 20:45

## 2018-02-05 RX ADMIN — OXCARBAZEPINE 600 MG: 300 TABLET, FILM COATED ORAL at 09:14

## 2018-02-05 ASSESSMENT — PAIN SCALES - GENERAL: PAINLEVEL_OUTOF10: 0

## 2018-02-05 NOTE — PLAN OF CARE
Problem: Falls - Risk of  Goal: Absence of falls  Outcome: Ongoing  Nair fall assessment scale complete. Fall band on ID bracelet and falling star posted on patient doorway. Non-skid socks remain in place with bed in lowest position and wheels locked. Call light within reach and bed alarm on. Will continue to monitor. Problem: Gas Exchange - Impaired:  Goal: Levels of oxygenation will improve  Levels of oxygenation will improve   Outcome: Ongoing  Patient in room air with O2 sat WDL. Problem: Infection, Septic Shock:  Goal: Will show no infection signs and symptoms  Will show no infection signs and symptoms   Outcome: Ongoing  No elevated temperature this shift, monitoring vitals and labs. IV fluids continue as ordered.

## 2018-02-06 LAB
ABSOLUTE EOS #: 0.1 K/UL (ref 0–0.4)
ABSOLUTE IMMATURE GRANULOCYTE: ABNORMAL K/UL (ref 0–0.3)
ABSOLUTE LYMPH #: 0.9 K/UL (ref 1–4.8)
ABSOLUTE MONO #: 0.9 K/UL (ref 0–1)
ANION GAP SERPL CALCULATED.3IONS-SCNC: 13 MMOL/L (ref 9–17)
BASOPHILS # BLD: 0 % (ref 0–2)
BASOPHILS ABSOLUTE: 0 K/UL (ref 0–0.2)
BUN BLDV-MCNC: 9 MG/DL (ref 6–20)
BUN/CREAT BLD: 9 (ref 9–20)
CALCIUM SERPL-MCNC: 8.3 MG/DL (ref 8.6–10.4)
CHLORIDE BLD-SCNC: 106 MMOL/L (ref 98–107)
CO2: 22 MMOL/L (ref 20–31)
CREAT SERPL-MCNC: 0.97 MG/DL (ref 0.7–1.2)
DIFFERENTIAL TYPE: ABNORMAL
EOSINOPHILS RELATIVE PERCENT: 2 % (ref 0–8)
GFR AFRICAN AMERICAN: >60 ML/MIN
GFR NON-AFRICAN AMERICAN: >60 ML/MIN
GFR SERPL CREATININE-BSD FRML MDRD: ABNORMAL ML/MIN/{1.73_M2}
GFR SERPL CREATININE-BSD FRML MDRD: ABNORMAL ML/MIN/{1.73_M2}
GLUCOSE BLD-MCNC: 124 MG/DL (ref 70–99)
HCT VFR BLD CALC: 31.2 % (ref 41–53)
HEMOGLOBIN: 10.2 G/DL (ref 13.5–17)
IMMATURE GRANULOCYTES: ABNORMAL %
LYMPHOCYTES # BLD: 9 % (ref 24–44)
MCH RBC QN AUTO: 28.3 PG (ref 26–34)
MCHC RBC AUTO-ENTMCNC: 32.7 G/DL (ref 31–37)
MCV RBC AUTO: 86.5 FL (ref 80–100)
MONOCYTES # BLD: 9 % (ref 0–12)
NRBC AUTOMATED: ABNORMAL PER 100 WBC
PDW BLD-RTO: 15.8 % (ref 12.1–15.2)
PLATELET # BLD: 185 K/UL (ref 140–450)
PLATELET ESTIMATE: ABNORMAL
PMV BLD AUTO: 8.8 FL (ref 6–12)
POTASSIUM SERPL-SCNC: 3.3 MMOL/L (ref 3.7–5.3)
RBC # BLD: 3.6 M/UL (ref 4.5–5.9)
RBC # BLD: ABNORMAL 10*6/UL
SEG NEUTROPHILS: 80 % (ref 36–66)
SEGMENTED NEUTROPHILS ABSOLUTE COUNT: 8 K/UL (ref 1.8–7.7)
SODIUM BLD-SCNC: 141 MMOL/L (ref 135–144)
WBC # BLD: 9.9 K/UL (ref 3.5–11)
WBC # BLD: ABNORMAL 10*3/UL

## 2018-02-06 PROCEDURE — 6370000000 HC RX 637 (ALT 250 FOR IP): Performed by: INTERNAL MEDICINE

## 2018-02-06 PROCEDURE — 6360000002 HC RX W HCPCS: Performed by: INTERNAL MEDICINE

## 2018-02-06 PROCEDURE — 92610 EVALUATE SWALLOWING FUNCTION: CPT

## 2018-02-06 PROCEDURE — 1200000000 HC SEMI PRIVATE

## 2018-02-06 PROCEDURE — 36415 COLL VENOUS BLD VENIPUNCTURE: CPT

## 2018-02-06 PROCEDURE — 80048 BASIC METABOLIC PNL TOTAL CA: CPT

## 2018-02-06 PROCEDURE — G8996 SWALLOW CURRENT STATUS: HCPCS

## 2018-02-06 PROCEDURE — G8997 SWALLOW GOAL STATUS: HCPCS

## 2018-02-06 PROCEDURE — 6370000000 HC RX 637 (ALT 250 FOR IP): Performed by: PHYSICIAN ASSISTANT

## 2018-02-06 PROCEDURE — 2580000003 HC RX 258: Performed by: INTERNAL MEDICINE

## 2018-02-06 PROCEDURE — 85025 COMPLETE CBC W/AUTO DIFF WBC: CPT

## 2018-02-06 PROCEDURE — 2580000003 HC RX 258: Performed by: PHYSICIAN ASSISTANT

## 2018-02-06 RX ORDER — POTASSIUM CHLORIDE 20 MEQ/1
40 TABLET, EXTENDED RELEASE ORAL ONCE
Status: COMPLETED | OUTPATIENT
Start: 2018-02-06 | End: 2018-02-06

## 2018-02-06 RX ORDER — LORAZEPAM 2 MG/ML
1 INJECTION INTRAMUSCULAR ONCE
Status: COMPLETED | OUTPATIENT
Start: 2018-02-06 | End: 2018-02-06

## 2018-02-06 RX ORDER — LORAZEPAM 2 MG/ML
1 INJECTION INTRAMUSCULAR DAILY PRN
Status: DISCONTINUED | OUTPATIENT
Start: 2018-02-06 | End: 2018-02-08 | Stop reason: HOSPADM

## 2018-02-06 RX ADMIN — BUSPIRONE HYDROCHLORIDE 30 MG: 5 TABLET ORAL at 08:29

## 2018-02-06 RX ADMIN — SODIUM CHLORIDE: 9 INJECTION, SOLUTION INTRAVENOUS at 11:00

## 2018-02-06 RX ADMIN — LORAZEPAM 1 MG: 2 INJECTION INTRAMUSCULAR; INTRAVENOUS at 06:27

## 2018-02-06 RX ADMIN — SODIUM CHLORIDE: 9 INJECTION, SOLUTION INTRAVENOUS at 00:51

## 2018-02-06 RX ADMIN — LORAZEPAM 1 MG: 1 TABLET ORAL at 13:10

## 2018-02-06 RX ADMIN — CETIRIZINE HYDROCHLORIDE 10 MG: 10 TABLET, FILM COATED ORAL at 08:30

## 2018-02-06 RX ADMIN — SODIUM CHLORIDE: 9 INJECTION, SOLUTION INTRAVENOUS at 22:14

## 2018-02-06 RX ADMIN — LORAZEPAM 1 MG: 1 TABLET ORAL at 21:20

## 2018-02-06 RX ADMIN — OXCARBAZEPINE 600 MG: 300 TABLET, FILM COATED ORAL at 13:11

## 2018-02-06 RX ADMIN — ONDANSETRON 4 MG: 2 INJECTION INTRAMUSCULAR; INTRAVENOUS at 04:58

## 2018-02-06 RX ADMIN — OXCARBAZEPINE 600 MG: 300 TABLET, FILM COATED ORAL at 08:30

## 2018-02-06 RX ADMIN — DIVALPROEX SODIUM 1250 MG: 250 TABLET, DELAYED RELEASE ORAL at 21:20

## 2018-02-06 RX ADMIN — POTASSIUM CHLORIDE 40 MEQ: 1500 TABLET, EXTENDED RELEASE ORAL at 13:05

## 2018-02-06 RX ADMIN — Medication 1 G: at 21:18

## 2018-02-06 RX ADMIN — ENALAPRIL MALEATE 10 MG: 10 TABLET ORAL at 08:30

## 2018-02-06 RX ADMIN — QUETIAPINE FUMARATE 100 MG: 100 TABLET ORAL at 21:20

## 2018-02-06 RX ADMIN — DIVALPROEX SODIUM 375 MG: 125 TABLET, DELAYED RELEASE ORAL at 08:30

## 2018-02-06 RX ADMIN — SODIUM CHLORIDE: 9 INJECTION, SOLUTION INTRAVENOUS at 04:58

## 2018-02-06 RX ADMIN — METOPROLOL TARTRATE 25 MG: 25 TABLET ORAL at 08:30

## 2018-02-06 RX ADMIN — LORAZEPAM 1 MG: 1 TABLET ORAL at 16:57

## 2018-02-06 RX ADMIN — ACETAMINOPHEN 650 MG: 325 TABLET ORAL at 18:32

## 2018-02-06 RX ADMIN — ACETAMINOPHEN 650 MG: 325 TABLET ORAL at 04:27

## 2018-02-06 RX ADMIN — DIPHENOXYLATE HYDROCHLORIDE AND ATROPINE SULFATE 1 TABLET: 2.5; .025 TABLET ORAL at 13:11

## 2018-02-06 RX ADMIN — OXCARBAZEPINE 600 MG: 300 TABLET, FILM COATED ORAL at 21:19

## 2018-02-06 RX ADMIN — QUETIAPINE FUMARATE 500 MG: 100 TABLET, FILM COATED ORAL at 13:06

## 2018-02-06 RX ADMIN — BUSPIRONE HYDROCHLORIDE 30 MG: 5 TABLET ORAL at 21:19

## 2018-02-06 RX ADMIN — LORAZEPAM 1 MG: 1 TABLET ORAL at 08:31

## 2018-02-06 RX ADMIN — ACETAMINOPHEN 650 MG: 325 TABLET ORAL at 08:37

## 2018-02-06 RX ADMIN — LORAZEPAM 1 MG: 1 TABLET ORAL at 02:06

## 2018-02-06 RX ADMIN — METOPROLOL TARTRATE 25 MG: 25 TABLET ORAL at 21:21

## 2018-02-06 RX ADMIN — QUETIAPINE FUMARATE 100 MG: 100 TABLET ORAL at 08:30

## 2018-02-06 ASSESSMENT — PAIN SCALES - GENERAL
PAINLEVEL_OUTOF10: 1
PAINLEVEL_OUTOF10: 0

## 2018-02-06 NOTE — PLAN OF CARE
Mother requested that we don't wake the patient to give medications at this time.     Santi Valenzuela

## 2018-02-06 NOTE — PLAN OF CARE
Problem: Nutrition  Goal: Optimal nutrition therapy  Outcome: Ongoing  Nutrition Problem: Increased nutrient needs  Intervention: Food and/or Nutrient Delivery: Continue current diet, Start ONS  Nutritional Goals: PO > 75% of meals and supplements

## 2018-02-07 ENCOUNTER — APPOINTMENT (OUTPATIENT)
Dept: GENERAL RADIOLOGY | Age: 33
DRG: 872 | End: 2018-02-07
Payer: MEDICARE

## 2018-02-07 LAB
ABSOLUTE EOS #: 0.2 K/UL (ref 0–0.4)
ABSOLUTE IMMATURE GRANULOCYTE: ABNORMAL K/UL (ref 0–0.3)
ABSOLUTE LYMPH #: 1.1 K/UL (ref 1–4.8)
ABSOLUTE MONO #: 0.8 K/UL (ref 0–1)
ANION GAP SERPL CALCULATED.3IONS-SCNC: 12 MMOL/L (ref 9–17)
BASOPHILS # BLD: 0 % (ref 0–2)
BASOPHILS ABSOLUTE: 0 K/UL (ref 0–0.2)
BUN BLDV-MCNC: 11 MG/DL (ref 6–20)
BUN/CREAT BLD: 12 (ref 9–20)
CALCIUM SERPL-MCNC: 8.7 MG/DL (ref 8.6–10.4)
CHLORIDE BLD-SCNC: 106 MMOL/L (ref 98–107)
CO2: 25 MMOL/L (ref 20–31)
CREAT SERPL-MCNC: 0.95 MG/DL (ref 0.7–1.2)
DIFFERENTIAL TYPE: ABNORMAL
EOSINOPHILS RELATIVE PERCENT: 2 % (ref 0–8)
GFR AFRICAN AMERICAN: >60 ML/MIN
GFR NON-AFRICAN AMERICAN: >60 ML/MIN
GFR SERPL CREATININE-BSD FRML MDRD: ABNORMAL ML/MIN/{1.73_M2}
GFR SERPL CREATININE-BSD FRML MDRD: ABNORMAL ML/MIN/{1.73_M2}
GLUCOSE BLD-MCNC: 97 MG/DL (ref 70–99)
HCT VFR BLD CALC: 32 % (ref 41–53)
HEMOGLOBIN: 10.5 G/DL (ref 13.5–17)
IMMATURE GRANULOCYTES: ABNORMAL %
LYMPHOCYTES # BLD: 14 % (ref 24–44)
MCH RBC QN AUTO: 27.9 PG (ref 26–34)
MCHC RBC AUTO-ENTMCNC: 32.9 G/DL (ref 31–37)
MCV RBC AUTO: 84.9 FL (ref 80–100)
MONOCYTES # BLD: 10 % (ref 0–12)
NRBC AUTOMATED: ABNORMAL PER 100 WBC
PDW BLD-RTO: 15.9 % (ref 12.1–15.2)
PLATELET # BLD: 194 K/UL (ref 140–450)
PLATELET ESTIMATE: ABNORMAL
PMV BLD AUTO: 8.9 FL (ref 6–12)
POTASSIUM SERPL-SCNC: 3.4 MMOL/L (ref 3.7–5.3)
RBC # BLD: 3.77 M/UL (ref 4.5–5.9)
RBC # BLD: ABNORMAL 10*6/UL
SEG NEUTROPHILS: 74 % (ref 36–66)
SEGMENTED NEUTROPHILS ABSOLUTE COUNT: 5.8 K/UL (ref 1.8–7.7)
SODIUM BLD-SCNC: 143 MMOL/L (ref 135–144)
WBC # BLD: 7.9 K/UL (ref 3.5–11)
WBC # BLD: ABNORMAL 10*3/UL

## 2018-02-07 PROCEDURE — 92526 ORAL FUNCTION THERAPY: CPT

## 2018-02-07 PROCEDURE — 2580000003 HC RX 258: Performed by: PHYSICIAN ASSISTANT

## 2018-02-07 PROCEDURE — 80048 BASIC METABOLIC PNL TOTAL CA: CPT

## 2018-02-07 PROCEDURE — G8996 SWALLOW CURRENT STATUS: HCPCS

## 2018-02-07 PROCEDURE — 74220 X-RAY XM ESOPHAGUS 1CNTRST: CPT

## 2018-02-07 PROCEDURE — G8998 SWALLOW D/C STATUS: HCPCS

## 2018-02-07 PROCEDURE — 6360000002 HC RX W HCPCS: Performed by: INTERNAL MEDICINE

## 2018-02-07 PROCEDURE — 1200000000 HC SEMI PRIVATE

## 2018-02-07 PROCEDURE — 36415 COLL VENOUS BLD VENIPUNCTURE: CPT

## 2018-02-07 PROCEDURE — G8997 SWALLOW GOAL STATUS: HCPCS

## 2018-02-07 PROCEDURE — 85025 COMPLETE CBC W/AUTO DIFF WBC: CPT

## 2018-02-07 PROCEDURE — 99231 SBSQ HOSP IP/OBS SF/LOW 25: CPT | Performed by: PODIATRIST

## 2018-02-07 PROCEDURE — 2580000003 HC RX 258: Performed by: INTERNAL MEDICINE

## 2018-02-07 PROCEDURE — 6370000000 HC RX 637 (ALT 250 FOR IP): Performed by: INTERNAL MEDICINE

## 2018-02-07 RX ORDER — METOPROLOL TARTRATE 5 MG/5ML
5 INJECTION INTRAVENOUS EVERY 6 HOURS PRN
Status: DISCONTINUED | OUTPATIENT
Start: 2018-02-07 | End: 2018-02-08 | Stop reason: HOSPADM

## 2018-02-07 RX ADMIN — METOPROLOL TARTRATE 25 MG: 25 TABLET ORAL at 08:04

## 2018-02-07 RX ADMIN — OXCARBAZEPINE 600 MG: 300 TABLET, FILM COATED ORAL at 14:47

## 2018-02-07 RX ADMIN — METOPROLOL TARTRATE 25 MG: 25 TABLET ORAL at 20:22

## 2018-02-07 RX ADMIN — Medication 1 G: at 20:24

## 2018-02-07 RX ADMIN — BUSPIRONE HYDROCHLORIDE 30 MG: 5 TABLET ORAL at 08:04

## 2018-02-07 RX ADMIN — QUETIAPINE FUMARATE 100 MG: 100 TABLET ORAL at 08:04

## 2018-02-07 RX ADMIN — ACETAMINOPHEN 650 MG: 325 TABLET ORAL at 20:22

## 2018-02-07 RX ADMIN — SODIUM CHLORIDE: 9 INJECTION, SOLUTION INTRAVENOUS at 04:19

## 2018-02-07 RX ADMIN — BUSPIRONE HYDROCHLORIDE 30 MG: 5 TABLET ORAL at 20:23

## 2018-02-07 RX ADMIN — QUETIAPINE FUMARATE 500 MG: 100 TABLET, FILM COATED ORAL at 12:18

## 2018-02-07 RX ADMIN — LORAZEPAM 1 MG: 1 TABLET ORAL at 08:04

## 2018-02-07 RX ADMIN — DIVALPROEX SODIUM 375 MG: 125 TABLET, DELAYED RELEASE ORAL at 08:04

## 2018-02-07 RX ADMIN — ACETAMINOPHEN 650 MG: 325 TABLET ORAL at 00:22

## 2018-02-07 RX ADMIN — SODIUM CHLORIDE: 9 INJECTION, SOLUTION INTRAVENOUS at 12:23

## 2018-02-07 RX ADMIN — SODIUM CHLORIDE: 9 INJECTION, SOLUTION INTRAVENOUS at 18:19

## 2018-02-07 RX ADMIN — LORAZEPAM 1 MG: 1 TABLET ORAL at 18:25

## 2018-02-07 RX ADMIN — DIVALPROEX SODIUM 1250 MG: 250 TABLET, DELAYED RELEASE ORAL at 20:22

## 2018-02-07 RX ADMIN — OXCARBAZEPINE 600 MG: 300 TABLET, FILM COATED ORAL at 08:05

## 2018-02-07 RX ADMIN — ENALAPRIL MALEATE 10 MG: 10 TABLET ORAL at 08:05

## 2018-02-07 RX ADMIN — QUETIAPINE FUMARATE 100 MG: 100 TABLET ORAL at 21:00

## 2018-02-07 RX ADMIN — OXCARBAZEPINE 600 MG: 300 TABLET, FILM COATED ORAL at 20:23

## 2018-02-07 RX ADMIN — CETIRIZINE HYDROCHLORIDE 10 MG: 10 TABLET, FILM COATED ORAL at 08:05

## 2018-02-07 ASSESSMENT — PAIN SCALES - GENERAL
PAINLEVEL_OUTOF10: 0

## 2018-02-07 NOTE — PLAN OF CARE
Problem: Falls - Risk of  Goal: Absence of falls  Outcome: Ongoing  Has not fallen. Room sitter and telesitter being used. Problem: Gas Exchange - Impaired:  Goal: Levels of oxygenation will improve  Levels of oxygenation will improve   Outcome: Ongoing  o2 sat 94% on room air has a non productive cough. Problem: Infection, Septic Shock:  Goal: Will show no infection signs and symptoms  Will show no infection signs and symptoms   Outcome: Ongoing  Has high fever 103.4 and tachycardia  tylenol given. Problem: Nutrition  Goal: Optimal nutrition therapy  Outcome: Ongoing  Food intake has been good. Problem: Pain:  Goal: Pain level will decrease  Pain level will decrease   Outcome: Ongoing  Denies any pain.

## 2018-02-07 NOTE — PLAN OF CARE
Problem: Falls - Risk of  Goal: Absence of falls  Outcome: Ongoing  Remains free from falls. Fall precautions in place, slipper socks on. Pathway clear. Call light and belongings in reach. Instructed to use call light prior to getting up. Problem: Gas Exchange - Impaired:  Goal: Levels of oxygenation will improve  Levels of oxygenation will improve   Outcome: Ongoing  Pulse ox greater than 94% on room air, breathing treatments given as ordered, no shortness of breath or difficulty breathing noted with exertion. Problem: Infection, Septic Shock:  Goal: Will show no infection signs and symptoms  Will show no infection signs and symptoms   Outcome: Ongoing  Pt afebrile at this time. No signs or symptoms of infection present. Will continue to monitor. Problem: Venous Thromboembolism:  Goal: Will show no signs or symptoms of venous thromboembolism  Will show no signs or symptoms of venous thromboembolism   Outcome: Ongoing  No signs or symptoms of DVT's present. Pt ambulatory in room, changing position frequently. Problem: Pain:  Goal: Pain level will decrease  Pain level will decrease   Outcome: Ongoing  Pt. Denies and pain or discomfort. Will continue to monitor.

## 2018-02-08 VITALS
SYSTOLIC BLOOD PRESSURE: 151 MMHG | HEART RATE: 96 BPM | OXYGEN SATURATION: 98 % | TEMPERATURE: 98.7 F | WEIGHT: 271 LBS | BODY MASS INDEX: 46.26 KG/M2 | DIASTOLIC BLOOD PRESSURE: 73 MMHG | RESPIRATION RATE: 16 BRPM | HEIGHT: 64 IN

## 2018-02-08 LAB
ABSOLUTE EOS #: 0.2 K/UL (ref 0–0.4)
ABSOLUTE IMMATURE GRANULOCYTE: ABNORMAL K/UL (ref 0–0.3)
ABSOLUTE LYMPH #: 1.3 K/UL (ref 1–4.8)
ABSOLUTE MONO #: 0.9 K/UL (ref 0–1)
ANION GAP SERPL CALCULATED.3IONS-SCNC: 13 MMOL/L (ref 9–17)
BASOPHILS # BLD: 1 % (ref 0–2)
BASOPHILS ABSOLUTE: 0.1 K/UL (ref 0–0.2)
BUN BLDV-MCNC: 12 MG/DL (ref 6–20)
BUN/CREAT BLD: 13 (ref 9–20)
CALCIUM SERPL-MCNC: 8.7 MG/DL (ref 8.6–10.4)
CHLORIDE BLD-SCNC: 106 MMOL/L (ref 98–107)
CO2: 27 MMOL/L (ref 20–31)
CREAT SERPL-MCNC: 0.89 MG/DL (ref 0.7–1.2)
CULTURE: NORMAL
DIFFERENTIAL TYPE: ABNORMAL
EOSINOPHILS RELATIVE PERCENT: 3 % (ref 0–8)
GFR AFRICAN AMERICAN: >60 ML/MIN
GFR NON-AFRICAN AMERICAN: >60 ML/MIN
GFR SERPL CREATININE-BSD FRML MDRD: ABNORMAL ML/MIN/{1.73_M2}
GFR SERPL CREATININE-BSD FRML MDRD: ABNORMAL ML/MIN/{1.73_M2}
GLUCOSE BLD-MCNC: 98 MG/DL (ref 70–99)
HCT VFR BLD CALC: 31.4 % (ref 41–53)
HEMOGLOBIN: 10.5 G/DL (ref 13.5–17)
IMMATURE GRANULOCYTES: ABNORMAL %
LYMPHOCYTES # BLD: 14 % (ref 24–44)
Lab: NORMAL
MCH RBC QN AUTO: 28.3 PG (ref 26–34)
MCHC RBC AUTO-ENTMCNC: 33.5 G/DL (ref 31–37)
MCV RBC AUTO: 84.6 FL (ref 80–100)
MONOCYTES # BLD: 10 % (ref 0–12)
NRBC AUTOMATED: ABNORMAL PER 100 WBC
PDW BLD-RTO: 16 % (ref 12.1–15.2)
PLATELET # BLD: 224 K/UL (ref 140–450)
PLATELET ESTIMATE: ABNORMAL
PMV BLD AUTO: 8 FL (ref 6–12)
POTASSIUM SERPL-SCNC: 3.6 MMOL/L (ref 3.7–5.3)
RBC # BLD: 3.71 M/UL (ref 4.5–5.9)
RBC # BLD: ABNORMAL 10*6/UL
SEG NEUTROPHILS: 72 % (ref 36–66)
SEGMENTED NEUTROPHILS ABSOLUTE COUNT: 6.6 K/UL (ref 1.8–7.7)
SODIUM BLD-SCNC: 146 MMOL/L (ref 135–144)
SPECIMEN DESCRIPTION: NORMAL
STATUS: NORMAL
WBC # BLD: 9 K/UL (ref 3.5–11)
WBC # BLD: ABNORMAL 10*3/UL

## 2018-02-08 PROCEDURE — 2580000003 HC RX 258: Performed by: PHYSICIAN ASSISTANT

## 2018-02-08 PROCEDURE — 6370000000 HC RX 637 (ALT 250 FOR IP): Performed by: INTERNAL MEDICINE

## 2018-02-08 PROCEDURE — 36415 COLL VENOUS BLD VENIPUNCTURE: CPT

## 2018-02-08 PROCEDURE — 85025 COMPLETE CBC W/AUTO DIFF WBC: CPT

## 2018-02-08 PROCEDURE — 80048 BASIC METABOLIC PNL TOTAL CA: CPT

## 2018-02-08 RX ORDER — DIPHENOXYLATE HYDROCHLORIDE AND ATROPINE SULFATE 2.5; .025 MG/1; MG/1
1 TABLET ORAL 4 TIMES DAILY PRN
Qty: 10 TABLET | Refills: 0 | Status: SHIPPED | OUTPATIENT
Start: 2018-02-08 | End: 2018-02-18

## 2018-02-08 RX ORDER — CIPROFLOXACIN 500 MG/1
500 TABLET, FILM COATED ORAL 2 TIMES DAILY
Qty: 20 TABLET | Refills: 0 | Status: SHIPPED | OUTPATIENT
Start: 2018-02-08 | End: 2018-02-18

## 2018-02-08 RX ADMIN — BUSPIRONE HYDROCHLORIDE 30 MG: 5 TABLET ORAL at 08:22

## 2018-02-08 RX ADMIN — METOPROLOL TARTRATE 25 MG: 25 TABLET ORAL at 08:23

## 2018-02-08 RX ADMIN — QUETIAPINE FUMARATE 100 MG: 100 TABLET ORAL at 08:23

## 2018-02-08 RX ADMIN — DIVALPROEX SODIUM 375 MG: 125 TABLET, DELAYED RELEASE ORAL at 08:23

## 2018-02-08 RX ADMIN — OXCARBAZEPINE 600 MG: 300 TABLET, FILM COATED ORAL at 08:23

## 2018-02-08 RX ADMIN — ENALAPRIL MALEATE 10 MG: 10 TABLET ORAL at 08:23

## 2018-02-08 RX ADMIN — CETIRIZINE HYDROCHLORIDE 10 MG: 10 TABLET, FILM COATED ORAL at 08:23

## 2018-02-08 RX ADMIN — SODIUM CHLORIDE: 9 INJECTION, SOLUTION INTRAVENOUS at 00:14

## 2018-02-08 RX ADMIN — SODIUM CHLORIDE: 9 INJECTION, SOLUTION INTRAVENOUS at 06:19

## 2018-02-08 ASSESSMENT — PAIN SCALES - GENERAL: PAINLEVEL_OUTOF10: 0

## 2018-02-08 NOTE — DISCHARGE SUMMARY
NU    Printed on:18 1031   Medication Information                      acetaminophen (TYLENOL) 325 MG tablet  Take 650 mg by mouth every 4 hours as needed for Pain or Fever             acetaminophen (TYLENOL) 650 MG suppository  Place 650 mg rectally every 4 hours as needed for Fever             busPIRone (BUSPAR) 30 MG tablet  take 1 tablet by mouth twice a day             ciprofloxacin (CIPRO) 500 MG tablet  Take 1 tablet by mouth 2 times daily for 10 days             diphenoxylate-atropine (LOMOTIL) 2.5-0.025 MG per tablet  Take 1 tablet by mouth 4 times daily as needed for Diarrhea for up to 10 days.              divalproex (DEPAKOTE) 125 MG DR tablet  Take 375 mg by mouth every morning Pt is to take 125mg and a 250mg tablet every morning             divalproex (DEPAKOTE) 250 MG DR tablet  Take 250 mg by mouth daily (with breakfast)             divalproex (DEPAKOTE) 250 MG DR tablet  Take 1,250 mg by mouth nightly             docusate sodium (COLACE) 100 MG capsule  Take 1 capsule by mouth 2 times daily as needed for Constipation             enalapril (VASOTEC) 10 MG tablet  Take 10 mg by mouth daily              ibuprofen (ADVIL;MOTRIN) 600 MG tablet  Take 600 mg by mouth every 6 hours as needed for Fever             loratadine (CLARITIN) 10 MG tablet  Take 10 mg by mouth daily as needed             LORazepam (ATIVAN) 1 MG tablet  Take 1 mg by mouth.             metoprolol tartrate (LOPRESSOR) 25 MG tablet  Take 1 tablet by mouth 2 times daily             mineral oil-hydrophilic petrolatum (AQUAPHOR) ointment  Apply topically 2 times daily Apply topically to bilateral feet             OXcarbazepine (TRILEPTAL) 600 MG tablet  Take 600 mg by mouth 3 times daily             promethazine (PHENERGAN) 25 MG suppository  Place 25 mg rectally every 6 hours as needed for Nausea             QUEtiapine (SEROQUEL) 100 MG tablet  Take 100 mg by mouth 2 times daily             QUEtiapine (SEROQUEL) 200 MG

## 2018-02-08 NOTE — PROGRESS NOTES
IM ADDENDUM    Pt's mother at bedside, states Christie Orta hasn't been able to keep anything down today. States as soon as he tries to eat something he vomits. She states he has done this before and had a food bolus stuck.       IMPRESSION  - dysphagia vs nausea with emesis    PLAN:  - swallow eval  - Zofran prn nausea / vomiting    Tamara Griffin MD  2/5/18 6:51 pm
Patient calmed down at this time. Pt sat back in the chair. Security able to leave the bedside. Tele-sitter remains in place, will continue to monitor.
Patient is discharge back to [facility]. The [facility] staff will transport the patient.   STELLA Nolan
Phenergan r/s given for nausea, and patient spitting up sputum. Will continue to monitor.
Raúl Mckeon 85 left bedside, Tele-sitter remains in place, will continue to monitor.
Roscoe Saeed from [facility] contacted writer for update. Writer informed her of plan for discharge today and gave her report on pt at this time. Will update again when discharge is finalized.
Speech Language Pathology  [facility]   BEDSIDE SWALLOW FOLLOW UP/DISCHARGE    NAME: Morales Alanis  : 1985  MRN: [id number]    ADMISSION DATE: 2/3/2018  ADMITTING DIAGNOSIS: has Chronic mental illness; Frostbite of feet, bilateral; Sepsis ([facility] [address] E coli bacteremia; E. coli UTI; Hypertension; Aortic valve stenosis; Coarctation of aorta; History of seizure disorder; Acute kidney injury ([facility] [address] and Lactic acidosis on his problem list.      Date of Eval: 2018  Evaluating Therapist: Rhoda Abreu    Current Diet level:  Current Diet : Mech soft  Current Liquid Diet : Thin      Primary Complaint  Patient Complaint: \"It's in my throat\" and pointed to lower throat     Pain:  Pain Assessment  Patient Currently in Pain: Denies      Reason for Referral  Morales Alanis was referred for a bedside swallow evaluation to assess the efficiency of his swallow function, identify signs and symptoms of aspiration and make recommendations regarding safe dietary consistencies, effective compensatory strategies, and safe eating environment. Impression  Dysphagia Diagnosis: Mild oral stage dysphagia;Mild pharyngeal stage dysphagia  Dysphagia Outcome Severity Scale: Level 5: Mild dysphagia- Distant supervision. May need one diet consistency restricted     Treatment Plan  Requires SLP Intervention: Yes  Duration/Frequency of Treatment: No further skilled acute intervention indicated at this time  D/C Recommendations: 24 hour supervision/assistance       Recommended Diet and Intervention  Diet Solids Recommendation: Dysphagia III Advanced  Liquid Consistency Recommendation: Thin  Recommended Form of Meds: PO  Therapeutic Interventions: Diet tolerance monitoring, Patient/Family education    Compensatory Swallowing Strategies  Compensatory Swallowing Strategies: Alternate solids and liquids;Small bites/sips;Upright as possible for all oral intake; External pacing;Eat/Feed slowly; Remain upright
Staff got patient to calm down and back in bed. Bed alarm on and tele-sitter remains in place. Pt took 1mg of Ativan PO, will continue to monitor.
Supervisor at bedside. Staff called [facility] to send staff out to calm patient down.
Telesitter initiated.
Updated Sonia at [facility].
Writer made Dr. Kallie Galaviz aware, while on floor at this time, that patient is having difficulty swallowing solid food and that mother stated that this has happened before, in which patient had food bolus stuck in lungs.
Writer updated pt's mother, Julio César Hester on discharge plans for today. Mother will not be in today due to pt being discharged back to [facility].
Lactic acidosis with LA 2.6 - 2 hour f/u was 1.1    Recent frostbite injuries to both feet 1/18/18 requiring debridement    Chronic mental retardation    Bipolar disorder    History of seizure disorder during childhood, no recent seizures    Chronic coarctation of the aorta with aortic stenosis    Morbid obesity       PLAN:  · Continue current therapy - IV Rocephin  · Tylenol for fever - avoid NSAIDs due to GAUTAM  · Recheck BMP, CBC with diff  · Podiatry consult re: bilateral foot wounds due to recent frostbite injuries    Ella Castanon M.D.  2/5/2018  7:47 AM
No erythema                                                                    No PMT on direct exam   IMP:  Stage 2 frostbite ; resolving and non infected , B/L feet            delicate epithelialization plantar feet ; s/p debridement   REC: zinc paste / menthol cream combination   P: daily maturation dressing / skin creams     [facility]  2/7/2018  4:45 PM

## 2018-02-27 ENCOUNTER — OFFICE VISIT (OUTPATIENT)
Dept: BURN CARE | Age: 33
End: 2018-02-27
Payer: MEDICARE

## 2018-02-27 VITALS
HEART RATE: 86 BPM | WEIGHT: 244.6 LBS | BODY MASS INDEX: 41.76 KG/M2 | DIASTOLIC BLOOD PRESSURE: 65 MMHG | HEIGHT: 64 IN | SYSTOLIC BLOOD PRESSURE: 137 MMHG

## 2018-02-27 DIAGNOSIS — T33.822A FROSTBITE OF BOTH FEET, INITIAL ENCOUNTER: ICD-10-CM

## 2018-02-27 DIAGNOSIS — T33.821A FROSTBITE OF BOTH FEET, INITIAL ENCOUNTER: ICD-10-CM

## 2018-02-27 PROCEDURE — 1111F DSCHRG MED/CURRENT MED MERGE: CPT | Performed by: PLASTIC SURGERY

## 2018-02-27 PROCEDURE — G8417 CALC BMI ABV UP PARAM F/U: HCPCS | Performed by: PLASTIC SURGERY

## 2018-02-27 PROCEDURE — 1036F TOBACCO NON-USER: CPT | Performed by: PLASTIC SURGERY

## 2018-02-27 PROCEDURE — G8484 FLU IMMUNIZE NO ADMIN: HCPCS | Performed by: PLASTIC SURGERY

## 2018-02-27 PROCEDURE — 99212 OFFICE O/P EST SF 10 MIN: CPT | Performed by: PLASTIC SURGERY

## 2018-02-27 PROCEDURE — G8427 DOCREV CUR MEDS BY ELIG CLIN: HCPCS | Performed by: PLASTIC SURGERY

## 2018-02-27 PROCEDURE — 99213 OFFICE O/P EST LOW 20 MIN: CPT

## 2018-03-12 ENCOUNTER — HOSPITAL ENCOUNTER (OUTPATIENT)
Dept: NON INVASIVE DIAGNOSTICS | Age: 33
Discharge: HOME | End: 2018-03-12
Payer: MEDICARE

## 2018-03-12 ENCOUNTER — HOSPITAL ENCOUNTER (OUTPATIENT)
Age: 33
Setting detail: SPECIMEN
Discharge: HOME | End: 2018-03-12
Payer: MEDICARE

## 2018-03-12 LAB
EKG ATRIAL RATE: 98 BPM
EKG P AXIS: 55 DEGREES
EKG P-R INTERVAL: 196 MS
EKG Q-T INTERVAL: 320 MS
EKG QRS DURATION: 94 MS
EKG QTC CALCULATION (BAZETT): 408 MS
EKG R AXIS: 94 DEGREES
EKG T AXIS: 8 DEGREES
EKG VENTRICULAR RATE: 98 BPM

## 2018-03-12 PROCEDURE — 87205 SMEAR GRAM STAIN: CPT

## 2018-03-12 PROCEDURE — 87070 CULTURE OTHR SPECIMN AEROBIC: CPT

## 2018-03-12 PROCEDURE — 86403 PARTICLE AGGLUT ANTBDY SCRN: CPT

## 2018-03-12 PROCEDURE — 93005 ELECTROCARDIOGRAM TRACING: CPT

## 2018-03-15 LAB
CULTURE: ABNORMAL
CULTURE: ABNORMAL
DIRECT EXAM: ABNORMAL
DIRECT EXAM: ABNORMAL
Lab: ABNORMAL
Lab: ABNORMAL
SPECIMEN DESCRIPTION: ABNORMAL
SPECIMEN DESCRIPTION: ABNORMAL
STATUS: ABNORMAL

## 2018-03-27 ENCOUNTER — HOSPITAL ENCOUNTER (OUTPATIENT)
Age: 33
Setting detail: SPECIMEN
Discharge: HOME | End: 2018-03-27
Payer: MEDICARE

## 2018-03-27 LAB
ABSOLUTE EOS #: 0.21 K/UL (ref 0–0.44)
ABSOLUTE IMMATURE GRANULOCYTE: 0.07 K/UL (ref 0–0.3)
ABSOLUTE LYMPH #: 2.25 K/UL (ref 1.1–3.7)
ABSOLUTE MONO #: 0.67 K/UL (ref 0.1–1.2)
ALBUMIN SERPL-MCNC: 4.3 G/DL (ref 3.5–5.2)
ALBUMIN/GLOBULIN RATIO: 2.2 (ref 1–2.5)
ALP BLD-CCNC: 49 U/L (ref 40–129)
ALT SERPL-CCNC: 39 U/L (ref 5–41)
ANION GAP SERPL CALCULATED.3IONS-SCNC: 11 MMOL/L (ref 9–17)
AST SERPL-CCNC: 25 U/L
BASOPHILS # BLD: 1 % (ref 0–2)
BASOPHILS ABSOLUTE: 0.06 K/UL (ref 0–0.2)
BILIRUB SERPL-MCNC: 0.31 MG/DL (ref 0.3–1.2)
BUN BLDV-MCNC: 19 MG/DL (ref 6–20)
BUN/CREAT BLD: 22 (ref 9–20)
CALCIUM SERPL-MCNC: 9.5 MG/DL (ref 8.6–10.4)
CHLORIDE BLD-SCNC: 103 MMOL/L (ref 98–107)
CHOLESTEROL/HDL RATIO: 5
CHOLESTEROL: 174 MG/DL
CO2: 28 MMOL/L (ref 20–31)
CREAT SERPL-MCNC: 0.87 MG/DL (ref 0.7–1.2)
DIFFERENTIAL TYPE: ABNORMAL
EOSINOPHILS RELATIVE PERCENT: 3 % (ref 1–4)
GFR AFRICAN AMERICAN: >60 ML/MIN
GFR NON-AFRICAN AMERICAN: >60 ML/MIN
GFR SERPL CREATININE-BSD FRML MDRD: ABNORMAL ML/MIN/{1.73_M2}
GFR SERPL CREATININE-BSD FRML MDRD: ABNORMAL ML/MIN/{1.73_M2}
GLUCOSE BLD-MCNC: 85 MG/DL (ref 70–99)
HCT VFR BLD CALC: 42.9 % (ref 40.7–50.3)
HDLC SERPL-MCNC: 35 MG/DL
HEMOGLOBIN: 13.6 G/DL (ref 13–17)
IMMATURE GRANULOCYTES: 1 %
LDL CHOLESTEROL: 101 MG/DL (ref 0–130)
LYMPHOCYTES # BLD: 36 % (ref 24–43)
MCH RBC QN AUTO: 27.9 PG (ref 25.2–33.5)
MCHC RBC AUTO-ENTMCNC: 31.7 G/DL (ref 28.4–34.8)
MCV RBC AUTO: 88.1 FL (ref 82.6–102.9)
MONOCYTES # BLD: 11 % (ref 3–12)
NRBC AUTOMATED: 0 PER 100 WBC
PDW BLD-RTO: 15.4 % (ref 11.8–14.4)
PLATELET # BLD: 173 K/UL (ref 138–453)
PLATELET ESTIMATE: ABNORMAL
PMV BLD AUTO: 9.3 FL (ref 8.1–13.5)
POTASSIUM SERPL-SCNC: 4.4 MMOL/L (ref 3.7–5.3)
RBC # BLD: 4.87 M/UL (ref 4.21–5.77)
RBC # BLD: ABNORMAL 10*6/UL
SEG NEUTROPHILS: 48 % (ref 36–65)
SEGMENTED NEUTROPHILS ABSOLUTE COUNT: 3.01 K/UL (ref 1.5–8.1)
SODIUM BLD-SCNC: 142 MMOL/L (ref 135–144)
TOTAL PROTEIN: 6.3 G/DL (ref 6.4–8.3)
TRIGL SERPL-MCNC: 191 MG/DL
VALPROIC ACID LEVEL: 105 UG/ML (ref 50–125)
VALPROIC DATE LAST DOSE: NORMAL
VALPROIC DOSE AMOUNT: NORMAL
VALPROIC TIME LAST DOSE: NORMAL
VLDLC SERPL CALC-MCNC: ABNORMAL MG/DL (ref 1–30)
WBC # BLD: 6.3 K/UL (ref 3.5–11.3)
WBC # BLD: ABNORMAL 10*3/UL

## 2018-03-27 PROCEDURE — 80164 ASSAY DIPROPYLACETIC ACD TOT: CPT

## 2018-03-27 PROCEDURE — 80053 COMPREHEN METABOLIC PANEL: CPT

## 2018-03-27 PROCEDURE — 36415 COLL VENOUS BLD VENIPUNCTURE: CPT

## 2018-03-27 PROCEDURE — 85025 COMPLETE CBC W/AUTO DIFF WBC: CPT

## 2018-03-27 PROCEDURE — P9604 ONE-WAY ALLOW PRORATED TRIP: HCPCS

## 2018-03-27 PROCEDURE — 80061 LIPID PANEL: CPT

## 2018-03-27 PROCEDURE — 80183 DRUG SCRN QUANT OXCARBAZEPIN: CPT

## 2018-03-29 LAB — OXCARBAZEPINE: 27 UG/ML (ref 3–35)

## 2018-08-04 ENCOUNTER — HOSPITAL ENCOUNTER (EMERGENCY)
Age: 33
Discharge: HOME | End: 2018-08-05
Attending: EMERGENCY MEDICINE
Payer: MEDICARE

## 2018-08-04 ENCOUNTER — APPOINTMENT (OUTPATIENT)
Dept: GENERAL RADIOLOGY | Age: 33
End: 2018-08-04
Payer: MEDICARE

## 2018-08-04 DIAGNOSIS — S00.412A ABRASION OF LEFT EAR CANAL, INITIAL ENCOUNTER: ICD-10-CM

## 2018-08-04 DIAGNOSIS — H66.92 LEFT OTITIS MEDIA, UNSPECIFIED OTITIS MEDIA TYPE: Primary | ICD-10-CM

## 2018-08-04 LAB
HCT VFR BLD CALC: 39 % (ref 40.7–50.3)
HEMOGLOBIN: 13.2 G/DL (ref 13–17)
MCH RBC QN AUTO: 28.4 PG (ref 25.2–33.5)
MCHC RBC AUTO-ENTMCNC: 33.8 G/DL (ref 28.4–34.8)
MCV RBC AUTO: 84.1 FL (ref 82.6–102.9)
NRBC AUTOMATED: 0 PER 100 WBC
PDW BLD-RTO: 14.6 % (ref 11.8–14.4)
PLATELET # BLD: 143 K/UL (ref 138–453)
PMV BLD AUTO: 9.8 FL (ref 8.1–13.5)
RBC # BLD: 4.64 M/UL (ref 4.21–5.77)
WBC # BLD: 9.7 K/UL (ref 3.5–11.3)

## 2018-08-04 PROCEDURE — 2580000003 HC RX 258: Performed by: EMERGENCY MEDICINE

## 2018-08-04 PROCEDURE — 99283 EMERGENCY DEPT VISIT LOW MDM: CPT

## 2018-08-04 PROCEDURE — 6370000000 HC RX 637 (ALT 250 FOR IP): Performed by: PHYSICIAN ASSISTANT

## 2018-08-04 PROCEDURE — 71045 X-RAY EXAM CHEST 1 VIEW: CPT

## 2018-08-04 PROCEDURE — 6370000000 HC RX 637 (ALT 250 FOR IP): Performed by: EMERGENCY MEDICINE

## 2018-08-04 PROCEDURE — 85027 COMPLETE CBC AUTOMATED: CPT

## 2018-08-04 PROCEDURE — 87040 BLOOD CULTURE FOR BACTERIA: CPT

## 2018-08-04 PROCEDURE — 83605 ASSAY OF LACTIC ACID: CPT

## 2018-08-04 PROCEDURE — 80053 COMPREHEN METABOLIC PANEL: CPT

## 2018-08-04 PROCEDURE — 36415 COLL VENOUS BLD VENIPUNCTURE: CPT

## 2018-08-04 RX ORDER — AMOXICILLIN 250 MG/1
1000 CAPSULE ORAL ONCE
Status: COMPLETED | OUTPATIENT
Start: 2018-08-04 | End: 2018-08-04

## 2018-08-04 RX ORDER — IBUPROFEN 400 MG/1
TABLET ORAL
Status: DISCONTINUED
Start: 2018-08-04 | End: 2018-08-05 | Stop reason: HOSPADM

## 2018-08-04 RX ORDER — AMOXICILLIN 875 MG/1
875 TABLET, COATED ORAL 2 TIMES DAILY
Qty: 20 TABLET | Refills: 0 | Status: SHIPPED | OUTPATIENT
Start: 2018-08-04 | End: 2018-08-14

## 2018-08-04 RX ORDER — IBUPROFEN 400 MG/1
400 TABLET ORAL ONCE
Status: COMPLETED | OUTPATIENT
Start: 2018-08-04 | End: 2018-08-04

## 2018-08-04 RX ORDER — NEOMYCIN SULFATE, POLYMYXIN B SULFATE, HYDROCORTISONE 3.5; 10000; 1 MG/ML; [USP'U]/ML; MG/ML
4 SOLUTION/ DROPS AURICULAR (OTIC) EVERY 6 HOURS
Qty: 10 ML | Refills: 0 | Status: SHIPPED | OUTPATIENT
Start: 2018-08-04 | End: 2018-08-14

## 2018-08-04 RX ORDER — ACETAMINOPHEN 500 MG
1000 TABLET ORAL EVERY 4 HOURS PRN
Status: DISCONTINUED | OUTPATIENT
Start: 2018-08-04 | End: 2018-08-05 | Stop reason: HOSPADM

## 2018-08-04 RX ORDER — 0.9 % SODIUM CHLORIDE 0.9 %
1000 INTRAVENOUS SOLUTION INTRAVENOUS ONCE
Status: COMPLETED | OUTPATIENT
Start: 2018-08-04 | End: 2018-08-05

## 2018-08-04 RX ADMIN — SODIUM CHLORIDE 1000 ML: 9 INJECTION, SOLUTION INTRAVENOUS at 23:41

## 2018-08-04 RX ADMIN — IBUPROFEN 400 MG: 400 TABLET, FILM COATED ORAL at 21:55

## 2018-08-04 RX ADMIN — ACETAMINOPHEN 1000 MG: 500 TABLET ORAL at 20:12

## 2018-08-04 RX ADMIN — AMOXICILLIN 1000 MG: 250 CAPSULE ORAL at 20:12

## 2018-08-04 RX ADMIN — IBUPROFEN 400 MG: 400 TABLET, FILM COATED ORAL at 20:12

## 2018-08-04 ASSESSMENT — PAIN DESCRIPTION - LOCATION: LOCATION: EAR

## 2018-08-04 ASSESSMENT — PAIN DESCRIPTION - ORIENTATION: ORIENTATION: LEFT

## 2018-08-04 ASSESSMENT — PAIN SCALES - GENERAL
PAINLEVEL_OUTOF10: 5
PAINLEVEL_OUTOF10: 5

## 2018-08-04 ASSESSMENT — PAIN DESCRIPTION - PAIN TYPE: TYPE: ACUTE PAIN

## 2018-08-04 ASSESSMENT — ENCOUNTER SYMPTOMS
COUGH: 0
SORE THROAT: 0
VOMITING: 0
NAUSEA: 0
ABDOMINAL PAIN: 0

## 2018-08-04 NOTE — ED PROVIDER NOTES
[facility] ED  eMERGENCY dEPARTMENT eNCOUnter      Pt Name: Larissa Tamez  MRN: [id number]  [place] 1985  Date of evaluation: 8/4/2018  Provider: Shelia Mckeon PA-C,    [address] [place] [address]       Chief Complaint   Patient presents with    Fever     today    Otalgia     left, today       HISTORY OF PRESENT ILLNESS    Larissa Tamez is a 34 y.o. male who presents to the emergency department from home Complaining of left ear pain and fever. She stated that his ear was itchy now turning. She denies any cough or vomiting headache or abdominal pain. Triage notes and Nursing notes were reviewed by myself. Any discrepancies are addressed above.     PAST MEDICAL HISTORY     Past Medical History:   Diagnosis Date    Aortic valve defect birth   [facility] Ann Stark Aortic valve stenosis     (unknown date of diagnosis, in med record from [place] [address])    Bipolar disorder [facility])     (unknown date of diagnosis, in med record from [place] [address])    Chronic mental illness     Coarctation of aorta     (unknown date of diagnosis, in med record from [place] [address])    Depression     (unknown date of diagnosis, in med record from [place] [address])   [facility] personality disorder     (unknown date of diagnosis, in med record from [place] [address])   Jerel Yung Tan [address] of both feet 01/18/2018    hospitalized at [facility]. V's - required debridement of both feet    History of seizure disorder     \"had seizures when younger d/t abilify\" (med record from [facility])    Hypertension     (unknown date of diagnosis, in med record from [place] [address])       SURGICAL HISTORY       Past Surgical History:   Procedure Laterality Date   [address]  09/2014    lung mass removal (med record from [facility])    PILONIDAL CYST DRAINAGE  2005       CURRENT MEDICATIONS       Previous Medications Spouse name: N/A    Number of children: N/A    Years of education: N/A     Social History Main Topics    Smoking status: Never Smoker    Smokeless tobacco: Never Used    Alcohol use No    Drug use: No    Sexual activity: No     Other Topics Concern    None     Social History Narrative    None       REVIEW OF SYSTEMS       Review of Systems   Constitutional: Positive for fever. HENT: Positive for ear pain. Negative for sore throat. Respiratory: Negative for cough. Gastrointestinal: Negative for abdominal pain, nausea and vomiting. Neurological: Negative for headaches. All other systems reviewed and are negative. Review of systems as in HPI & PMH otherwise negative    PHYSICAL EXAM    (up to 7 for level 4, 8 or more for level 5)     ED Triage Vitals   BP Temp Temp src Pulse Resp SpO2 Height Weight   -- -- -- -- -- -- -- --       Physical Exam   Constitutional: He is oriented to person, place, and time. He appears well-developed and well-nourished. HENT:   Head: Normocephalic and atraumatic. Left TM is pink blood in the canal does have scratched ear canal.   Eyes: Conjunctivae are normal. No scleral icterus. Neck: Normal range of motion. Neck supple. Cardiovascular: Normal rate and regular rhythm. Pulmonary/Chest: Effort normal and breath sounds normal.   Abdominal: Soft. Bowel sounds are normal.   Musculoskeletal: Normal range of motion. Neurological: He is alert and oriented to person, place, and time. No cranial nerve deficit. Coordination normal.   Skin: Skin is warm and dry. Psychiatric: He has a normal mood and affect. His behavior is normal. Judgment and thought content normal.   Nursing note and vitals reviewed.       DIAGNOSTIC RESULTS     EKG: (none if blank)  All EKG's are interpreted by the Emergency Department Physician who either signs or Co-signs this chart in the absence of a cardiologist.        RADIOLOGY: (none if blank)   Interpretation per the Radiologist below,

## 2018-08-05 VITALS
OXYGEN SATURATION: 95 % | RESPIRATION RATE: 16 BRPM | TEMPERATURE: 99.5 F | SYSTOLIC BLOOD PRESSURE: 122 MMHG | DIASTOLIC BLOOD PRESSURE: 73 MMHG | HEART RATE: 101 BPM

## 2018-08-05 LAB
-: ABNORMAL
ALBUMIN SERPL-MCNC: 4 G/DL (ref 3.5–5.2)
ALBUMIN/GLOBULIN RATIO: 1.5 (ref 1–2.5)
ALP BLD-CCNC: 51 U/L (ref 40–129)
ALT SERPL-CCNC: 32 U/L (ref 5–41)
AMORPHOUS: ABNORMAL
ANION GAP SERPL CALCULATED.3IONS-SCNC: 15 MMOL/L (ref 9–17)
AST SERPL-CCNC: 23 U/L
BACTERIA: ABNORMAL
BILIRUB SERPL-MCNC: 0.5 MG/DL (ref 0.3–1.2)
BILIRUBIN URINE: NEGATIVE
BUN BLDV-MCNC: 19 MG/DL (ref 6–20)
BUN/CREAT BLD: 20 (ref 9–20)
CALCIUM SERPL-MCNC: 9.1 MG/DL (ref 8.6–10.4)
CASTS UA: ABNORMAL /LPF
CHLORIDE BLD-SCNC: 96 MMOL/L (ref 98–107)
CO2: 24 MMOL/L (ref 20–31)
COLOR: ABNORMAL
COMMENT UA: ABNORMAL
CREAT SERPL-MCNC: 0.96 MG/DL (ref 0.7–1.2)
CRYSTALS, UA: ABNORMAL /HPF
EPITHELIAL CELLS UA: ABNORMAL /HPF (ref 0–5)
GFR AFRICAN AMERICAN: >60 ML/MIN
GFR NON-AFRICAN AMERICAN: >60 ML/MIN
GFR SERPL CREATININE-BSD FRML MDRD: ABNORMAL ML/MIN/{1.73_M2}
GFR SERPL CREATININE-BSD FRML MDRD: ABNORMAL ML/MIN/{1.73_M2}
GLUCOSE BLD-MCNC: 115 MG/DL (ref 70–99)
GLUCOSE URINE: NEGATIVE
KETONES, URINE: ABNORMAL
LACTIC ACID, SEPSIS WHOLE BLOOD: NORMAL MMOL/L (ref 0.5–1.9)
LACTIC ACID, SEPSIS: 0.8 MMOL/L (ref 0.5–1.9)
LEUKOCYTE ESTERASE, URINE: NEGATIVE
MUCUS: ABNORMAL
NITRITE, URINE: NEGATIVE
OTHER OBSERVATIONS UA: ABNORMAL
PH UA: 6 (ref 5–9)
POTASSIUM SERPL-SCNC: 4.1 MMOL/L (ref 3.7–5.3)
PROTEIN UA: ABNORMAL
RBC UA: ABNORMAL /HPF (ref 0–2)
RENAL EPITHELIAL, UA: ABNORMAL /HPF
SODIUM BLD-SCNC: 135 MMOL/L (ref 135–144)
SPECIFIC GRAVITY UA: >1.03 (ref 1.01–1.02)
TOTAL PROTEIN: 6.7 G/DL (ref 6.4–8.3)
TRICHOMONAS: ABNORMAL
TURBIDITY: ABNORMAL
URINE HGB: NEGATIVE
UROBILINOGEN, URINE: NORMAL
WBC UA: ABNORMAL /HPF (ref 0–5)
YEAST: ABNORMAL

## 2018-08-05 PROCEDURE — 81001 URINALYSIS AUTO W/SCOPE: CPT

## 2018-08-05 NOTE — ED NOTES
Telephone call from [facility]. Update given.       [address], RN  08/04/18 7167

## 2018-08-05 NOTE — ED PROVIDER NOTES
[facility] ED  eMERGENCY dEPARTMENT eNCOUnter      Pt Name: Wilfrid Ybarra  MRN: [id number]  [place] 1985  Date of evaluation: 8/4/2018  Provider: Bing Harp MD        EMERGENCY DEPARTMENT COURSE and DIFFERENTIAL DIAGNOSIS/MDM:     Care of Wilfrid Ybarra was assumed from [facility]. Vitals:    Vitals:    08/04/18 2250 08/05/18 0012 08/05/18 0014 08/05/18 0158   BP:  122/73     Pulse: 114  106 101   Resp:       Temp: 100.8 °F (38.2 °C)  99.8 °F (37.7 °C) 99.5 °F (37.5 °C)   TempSrc: Tympanic  Tympanic Tympanic   SpO2: 93% 91%  95%         MDM  Number of Diagnoses or Management Options  Abrasion of left ear canal, initial encounter:   Left otitis media, unspecified otitis media type:   Diagnosis management comments: Patient remained febrile and tachycardic after signout to me by the physician assistant. Was given additional antipyretics and still remained febrile and tachycardic. IV fluids were initiated lactate was drawn            Procedures    FINAL IMPRESSION      1. Left otitis media, unspecified otitis media type    2. Abrasion of left ear canal, initial encounter          DISPOSITION/PLAN   DISPOSITION Decision To Discharge 08/04/2018 08:01:42 PM      PATIENT REFERRED TO:  No follow-up provider specified.     DISCHARGE MEDICATIONS:  New Prescriptions    AMOXICILLIN (AMOXIL) 875 MG TABLET    Take 1 tablet by mouth 2 times daily for 10 days    NEOMYCIN-POLYMYXIN-HYDROCORTISONE 1 % SOLN OTIC SOLUTION    Place 4 drops into the left ear every 6 hours for 10 days              Summation      Patient Course:      ED Medications administered this visit:    Medications   acetaminophen (TYLENOL) tablet 1,000 mg (1,000 mg Oral Given 8/4/18 2012)   amoxicillin (AMOXIL) capsule 1,000 mg (1,000 mg Oral Given 8/4/18 2012)   ibuprofen (ADVIL;MOTRIN) tablet 400 mg (400 mg Oral Given 8/4/18 2012)   ibuprofen (ADVIL;MOTRIN) tablet 400 mg (400 mg Oral Not Given 8/4/18 2200)   0.9 % sodium chloride bolus (0 mLs Intravenous Stopped 8/5/18 0040)       New Prescriptions from this visit:    New Prescriptions    AMOXICILLIN (AMOXIL) 875 MG TABLET    Take 1 tablet by mouth 2 times daily for 10 days    NEOMYCIN-POLYMYXIN-HYDROCORTISONE 1 % SOLN OTIC SOLUTION    Place 4 drops into the left ear every 6 hours for 10 days       Follow-up:  No follow-up provider specified. Final Impression:   1. Left otitis media, unspecified otitis media type    2.  Abrasion of left ear canal, initial encounter               (Please note that portions of this note were completed with a voice recognition program.  Efforts were made to edit the dictations but occasionally words are mis-transcribed.)           Refugio Blunt MD  08/05/18 7732

## 2018-08-09 LAB
CULTURE: NORMAL
Lab: NORMAL
SPECIMEN DESCRIPTION: NORMAL
STATUS: NORMAL

## 2018-09-10 ENCOUNTER — HOSPITAL ENCOUNTER (OUTPATIENT)
Dept: NON INVASIVE DIAGNOSTICS | Age: 33
Discharge: HOME | End: 2018-09-10
Payer: MEDICARE

## 2018-09-10 LAB
EKG ATRIAL RATE: 93 BPM
EKG P AXIS: 62 DEGREES
EKG P-R INTERVAL: 196 MS
EKG Q-T INTERVAL: 334 MS
EKG QRS DURATION: 100 MS
EKG QTC CALCULATION (BAZETT): 415 MS
EKG R AXIS: 146 DEGREES
EKG T AXIS: 53 DEGREES
EKG VENTRICULAR RATE: 93 BPM

## 2018-09-10 PROCEDURE — 93005 ELECTROCARDIOGRAM TRACING: CPT

## 2018-09-27 ENCOUNTER — HOSPITAL ENCOUNTER (OUTPATIENT)
Age: 33
Setting detail: SPECIMEN
Discharge: HOME | End: 2018-09-27
Payer: MEDICARE

## 2018-09-27 LAB
ABSOLUTE EOS #: 0.13 K/UL (ref 0–0.44)
ABSOLUTE IMMATURE GRANULOCYTE: 0.06 K/UL (ref 0–0.3)
ABSOLUTE LYMPH #: 2.17 K/UL (ref 1.1–3.7)
ABSOLUTE MONO #: 0.65 K/UL (ref 0.1–1.2)
ALBUMIN SERPL-MCNC: 4.7 G/DL (ref 3.5–5.2)
ALBUMIN/GLOBULIN RATIO: 2.5 (ref 1–2.5)
ALP BLD-CCNC: 57 U/L (ref 40–129)
ALT SERPL-CCNC: 53 U/L (ref 5–41)
ANION GAP SERPL CALCULATED.3IONS-SCNC: 13 MMOL/L (ref 9–17)
AST SERPL-CCNC: 30 U/L
BASOPHILS # BLD: 1 % (ref 0–2)
BASOPHILS ABSOLUTE: 0.07 K/UL (ref 0–0.2)
BILIRUB SERPL-MCNC: 0.31 MG/DL (ref 0.3–1.2)
BUN BLDV-MCNC: 17 MG/DL (ref 6–20)
BUN/CREAT BLD: 22 (ref 9–20)
CALCIUM SERPL-MCNC: 9.3 MG/DL (ref 8.6–10.4)
CHLORIDE BLD-SCNC: 102 MMOL/L (ref 98–107)
CO2: 26 MMOL/L (ref 20–31)
CREAT SERPL-MCNC: 0.77 MG/DL (ref 0.7–1.2)
DIFFERENTIAL TYPE: ABNORMAL
EOSINOPHILS RELATIVE PERCENT: 2 % (ref 1–4)
ESTIMATED AVERAGE GLUCOSE: 100 MG/DL
GFR AFRICAN AMERICAN: >60 ML/MIN
GFR NON-AFRICAN AMERICAN: >60 ML/MIN
GFR SERPL CREATININE-BSD FRML MDRD: ABNORMAL ML/MIN/{1.73_M2}
GFR SERPL CREATININE-BSD FRML MDRD: ABNORMAL ML/MIN/{1.73_M2}
GLUCOSE BLD-MCNC: 94 MG/DL (ref 70–99)
HBA1C MFR BLD: 5.1 % (ref 4.8–5.9)
HCT VFR BLD CALC: 45.3 % (ref 40.7–50.3)
HEMOGLOBIN: 14.9 G/DL (ref 13–17)
IMMATURE GRANULOCYTES: 1 %
LYMPHOCYTES # BLD: 37 % (ref 24–43)
MCH RBC QN AUTO: 29 PG (ref 25.2–33.5)
MCHC RBC AUTO-ENTMCNC: 32.9 G/DL (ref 28.4–34.8)
MCV RBC AUTO: 88.3 FL (ref 82.6–102.9)
MONOCYTES # BLD: 11 % (ref 3–12)
NRBC AUTOMATED: 0 PER 100 WBC
PDW BLD-RTO: 14.2 % (ref 11.8–14.4)
PLATELET # BLD: 156 K/UL (ref 138–453)
PLATELET ESTIMATE: ABNORMAL
PMV BLD AUTO: 9.8 FL (ref 8.1–13.5)
POTASSIUM SERPL-SCNC: 4.3 MMOL/L (ref 3.7–5.3)
RBC # BLD: 5.13 M/UL (ref 4.21–5.77)
RBC # BLD: ABNORMAL 10*6/UL
SEG NEUTROPHILS: 48 % (ref 36–65)
SEGMENTED NEUTROPHILS ABSOLUTE COUNT: 2.74 K/UL (ref 1.5–8.1)
SODIUM BLD-SCNC: 141 MMOL/L (ref 135–144)
TOTAL PROTEIN: 6.6 G/DL (ref 6.4–8.3)
VALPROIC ACID LEVEL: 99 UG/ML (ref 50–125)
VALPROIC DATE LAST DOSE: NORMAL
VALPROIC DOSE AMOUNT: NORMAL
VALPROIC TIME LAST DOSE: NORMAL
WBC # BLD: 5.8 K/UL (ref 3.5–11.3)
WBC # BLD: ABNORMAL 10*3/UL

## 2018-09-27 PROCEDURE — 83036 HEMOGLOBIN GLYCOSYLATED A1C: CPT

## 2018-09-27 PROCEDURE — 80164 ASSAY DIPROPYLACETIC ACD TOT: CPT

## 2018-09-27 PROCEDURE — P9604 ONE-WAY ALLOW PRORATED TRIP: HCPCS

## 2018-09-27 PROCEDURE — 80183 DRUG SCRN QUANT OXCARBAZEPIN: CPT

## 2018-09-27 PROCEDURE — 80053 COMPREHEN METABOLIC PANEL: CPT

## 2018-09-27 PROCEDURE — 85025 COMPLETE CBC W/AUTO DIFF WBC: CPT

## 2018-09-27 PROCEDURE — 36415 COLL VENOUS BLD VENIPUNCTURE: CPT

## 2018-09-29 LAB — OXCARBAZEPINE: 21 UG/ML (ref 3–35)

## 2018-10-18 ENCOUNTER — HOSPITAL ENCOUNTER (OUTPATIENT)
Age: 33
Setting detail: SPECIMEN
Discharge: HOME | End: 2018-10-18
Payer: MEDICARE

## 2018-10-18 LAB — HIV AG/AB: NONREACTIVE

## 2018-10-18 PROCEDURE — 36415 COLL VENOUS BLD VENIPUNCTURE: CPT

## 2018-10-18 PROCEDURE — 87389 HIV-1 AG W/HIV-1&-2 AB AG IA: CPT

## 2018-10-18 PROCEDURE — 86803 HEPATITIS C AB TEST: CPT

## 2018-10-19 LAB — HEPATITIS C ANTIBODY: NONREACTIVE

## 2019-02-25 ENCOUNTER — HOSPITAL ENCOUNTER (OUTPATIENT)
Age: 34
Setting detail: SPECIMEN
Discharge: HOME | End: 2019-02-25
Payer: MEDICARE

## 2019-02-25 PROCEDURE — 87205 SMEAR GRAM STAIN: CPT

## 2019-02-25 PROCEDURE — 87186 SC STD MICRODIL/AGAR DIL: CPT

## 2019-02-25 PROCEDURE — 87070 CULTURE OTHR SPECIMN AEROBIC: CPT

## 2019-02-25 PROCEDURE — 86403 PARTICLE AGGLUT ANTBDY SCRN: CPT

## 2019-02-28 LAB
CULTURE: ABNORMAL
DIRECT EXAM: ABNORMAL
Lab: ABNORMAL
SPECIMEN DESCRIPTION: ABNORMAL

## 2019-03-11 ENCOUNTER — HOSPITAL ENCOUNTER (OUTPATIENT)
Dept: NON INVASIVE DIAGNOSTICS | Age: 34
Discharge: HOME | End: 2019-03-11
Payer: MEDICARE

## 2019-03-11 LAB
EKG ATRIAL RATE: 100 BPM
EKG P AXIS: 50 DEGREES
EKG P-R INTERVAL: 212 MS
EKG Q-T INTERVAL: 334 MS
EKG QRS DURATION: 102 MS
EKG QTC CALCULATION (BAZETT): 430 MS
EKG R AXIS: 92 DEGREES
EKG T AXIS: -4 DEGREES
EKG VENTRICULAR RATE: 100 BPM

## 2019-03-11 PROCEDURE — 93005 ELECTROCARDIOGRAM TRACING: CPT

## 2019-03-12 ENCOUNTER — HOSPITAL ENCOUNTER (OUTPATIENT)
Age: 34
Setting detail: SPECIMEN
Discharge: HOME | End: 2019-03-12
Payer: MEDICARE

## 2019-03-14 ENCOUNTER — HOSPITAL ENCOUNTER (OUTPATIENT)
Age: 34
Setting detail: SPECIMEN
Discharge: HOME | End: 2019-03-14
Payer: MEDICARE

## 2019-03-14 LAB
ABSOLUTE EOS #: 0.14 K/UL (ref 0–0.44)
ABSOLUTE IMMATURE GRANULOCYTE: 0.05 K/UL (ref 0–0.3)
ABSOLUTE LYMPH #: 2.11 K/UL (ref 1.1–3.7)
ABSOLUTE MONO #: 0.61 K/UL (ref 0.1–1.2)
ALBUMIN SERPL-MCNC: 4.7 G/DL (ref 3.5–5.2)
ALBUMIN/GLOBULIN RATIO: 2.9 (ref 1–2.5)
ALP BLD-CCNC: 63 U/L (ref 40–129)
ALT SERPL-CCNC: 79 U/L (ref 5–41)
ANION GAP SERPL CALCULATED.3IONS-SCNC: 14 MMOL/L (ref 9–17)
AST SERPL-CCNC: 55 U/L
BASOPHILS # BLD: 1 % (ref 0–2)
BASOPHILS ABSOLUTE: 0.06 K/UL (ref 0–0.2)
BILIRUB SERPL-MCNC: 0.42 MG/DL (ref 0.3–1.2)
BUN BLDV-MCNC: 16 MG/DL (ref 6–20)
BUN/CREAT BLD: 18 (ref 9–20)
CALCIUM SERPL-MCNC: 9.5 MG/DL (ref 8.6–10.4)
CHLORIDE BLD-SCNC: 99 MMOL/L (ref 98–107)
CO2: 26 MMOL/L (ref 20–31)
CREAT SERPL-MCNC: 0.89 MG/DL (ref 0.7–1.2)
DIFFERENTIAL TYPE: ABNORMAL
EOSINOPHILS RELATIVE PERCENT: 3 % (ref 1–4)
ESTIMATED AVERAGE GLUCOSE: 100 MG/DL
GFR AFRICAN AMERICAN: >60 ML/MIN
GFR NON-AFRICAN AMERICAN: >60 ML/MIN
GFR SERPL CREATININE-BSD FRML MDRD: ABNORMAL ML/MIN/{1.73_M2}
GFR SERPL CREATININE-BSD FRML MDRD: ABNORMAL ML/MIN/{1.73_M2}
GLUCOSE BLD-MCNC: 82 MG/DL (ref 70–99)
HBA1C MFR BLD: 5.1 % (ref 4.8–5.9)
HCT VFR BLD CALC: 44.6 % (ref 40.7–50.3)
HEMOGLOBIN: 14.3 G/DL (ref 13–17)
IMMATURE GRANULOCYTES: 1 %
LYMPHOCYTES # BLD: 37 % (ref 24–43)
MCH RBC QN AUTO: 28.4 PG (ref 25.2–33.5)
MCHC RBC AUTO-ENTMCNC: 32.1 G/DL (ref 28.4–34.8)
MCV RBC AUTO: 88.7 FL (ref 82.6–102.9)
MONOCYTES # BLD: 11 % (ref 3–12)
NRBC AUTOMATED: 0 PER 100 WBC
PDW BLD-RTO: 13.8 % (ref 11.8–14.4)
PLATELET # BLD: 166 K/UL (ref 138–453)
PLATELET ESTIMATE: ABNORMAL
PMV BLD AUTO: 10.7 FL (ref 8.1–13.5)
POTASSIUM SERPL-SCNC: 4.4 MMOL/L (ref 3.7–5.3)
RBC # BLD: 5.03 M/UL (ref 4.21–5.77)
RBC # BLD: ABNORMAL 10*6/UL
SEG NEUTROPHILS: 47 % (ref 36–65)
SEGMENTED NEUTROPHILS ABSOLUTE COUNT: 2.67 K/UL (ref 1.5–8.1)
SODIUM BLD-SCNC: 139 MMOL/L (ref 135–144)
TOTAL PROTEIN: 6.3 G/DL (ref 6.4–8.3)
VALPROIC ACID LEVEL: 85 UG/ML (ref 50–125)
VALPROIC DATE LAST DOSE: NORMAL
VALPROIC DOSE AMOUNT: NORMAL
VALPROIC TIME LAST DOSE: NORMAL
WBC # BLD: 5.6 K/UL (ref 3.5–11.3)
WBC # BLD: ABNORMAL 10*3/UL

## 2019-03-14 PROCEDURE — 80164 ASSAY DIPROPYLACETIC ACD TOT: CPT

## 2019-03-14 PROCEDURE — 85025 COMPLETE CBC W/AUTO DIFF WBC: CPT

## 2019-03-14 PROCEDURE — P9603 ONE-WAY ALLOW PRORATED MILES: HCPCS

## 2019-03-14 PROCEDURE — 80183 DRUG SCRN QUANT OXCARBAZEPIN: CPT

## 2019-03-14 PROCEDURE — 36415 COLL VENOUS BLD VENIPUNCTURE: CPT

## 2019-03-14 PROCEDURE — 83036 HEMOGLOBIN GLYCOSYLATED A1C: CPT

## 2019-03-14 PROCEDURE — 80053 COMPREHEN METABOLIC PANEL: CPT

## 2019-03-17 LAB — OXCARBAZEPINE: 23 UG/ML (ref 3–35)

## 2019-03-18 ENCOUNTER — HOSPITAL ENCOUNTER (OUTPATIENT)
Dept: GENERAL RADIOLOGY | Age: 34
Discharge: HOME | End: 2019-03-20
Payer: MEDICARE

## 2019-03-18 DIAGNOSIS — R60.9 SWELLING: ICD-10-CM

## 2019-03-18 DIAGNOSIS — R52 PAIN: ICD-10-CM

## 2019-03-18 PROCEDURE — 73610 X-RAY EXAM OF ANKLE: CPT

## 2019-03-18 PROCEDURE — 73630 X-RAY EXAM OF FOOT: CPT

## 2019-04-18 ENCOUNTER — HOSPITAL ENCOUNTER (OUTPATIENT)
Age: 34
Setting detail: SPECIMEN
Discharge: HOME | End: 2019-04-18
Payer: MEDICARE

## 2019-04-18 LAB
ALBUMIN SERPL-MCNC: 4.2 G/DL (ref 3.5–5.2)
ALBUMIN/GLOBULIN RATIO: 2 (ref 1–2.5)
ALP BLD-CCNC: 61 U/L (ref 40–129)
ALT SERPL-CCNC: 65 U/L (ref 5–41)
ANION GAP SERPL CALCULATED.3IONS-SCNC: 12 MMOL/L (ref 9–17)
AST SERPL-CCNC: 45 U/L
BILIRUB SERPL-MCNC: 0.39 MG/DL (ref 0.3–1.2)
BUN BLDV-MCNC: 14 MG/DL (ref 6–20)
BUN/CREAT BLD: 20 (ref 9–20)
CALCIUM SERPL-MCNC: 9.7 MG/DL (ref 8.6–10.4)
CHLORIDE BLD-SCNC: 103 MMOL/L (ref 98–107)
CO2: 26 MMOL/L (ref 20–31)
CREAT SERPL-MCNC: 0.7 MG/DL (ref 0.7–1.2)
GFR AFRICAN AMERICAN: >60 ML/MIN
GFR NON-AFRICAN AMERICAN: >60 ML/MIN
GFR SERPL CREATININE-BSD FRML MDRD: ABNORMAL ML/MIN/{1.73_M2}
GFR SERPL CREATININE-BSD FRML MDRD: ABNORMAL ML/MIN/{1.73_M2}
GGT: 66 U/L (ref 8–61)
GLUCOSE BLD-MCNC: 87 MG/DL (ref 70–99)
POTASSIUM SERPL-SCNC: 4.3 MMOL/L (ref 3.7–5.3)
SODIUM BLD-SCNC: 141 MMOL/L (ref 135–144)
TOTAL PROTEIN: 6.3 G/DL (ref 6.4–8.3)

## 2019-04-18 PROCEDURE — 80053 COMPREHEN METABOLIC PANEL: CPT

## 2019-04-18 PROCEDURE — P9604 ONE-WAY ALLOW PRORATED TRIP: HCPCS

## 2019-04-18 PROCEDURE — 36415 COLL VENOUS BLD VENIPUNCTURE: CPT

## 2019-04-18 PROCEDURE — 82977 ASSAY OF GGT: CPT

## 2019-04-25 ENCOUNTER — HOSPITAL ENCOUNTER (OUTPATIENT)
Age: 34
Setting detail: SPECIMEN
Discharge: HOME | End: 2019-04-25
Payer: MEDICARE

## 2019-04-25 LAB — RUBV IGG SER QL: 21.6 IU/ML

## 2019-04-25 PROCEDURE — 36415 COLL VENOUS BLD VENIPUNCTURE: CPT

## 2019-04-25 PROCEDURE — 86765 RUBEOLA ANTIBODY: CPT

## 2019-04-25 PROCEDURE — 86735 MUMPS ANTIBODY: CPT

## 2019-04-25 PROCEDURE — P9603 ONE-WAY ALLOW PRORATED MILES: HCPCS

## 2019-04-25 PROCEDURE — 86762 RUBELLA ANTIBODY: CPT

## 2019-04-26 LAB
MEASLES IMMUNE (IGG): 0.22
MUV IGG SER QL: 0.62

## 2019-07-23 ENCOUNTER — HOSPITAL ENCOUNTER (OUTPATIENT)
Age: 34
Setting detail: SPECIMEN
Discharge: HOME | End: 2019-07-23
Payer: MEDICARE

## 2019-07-23 LAB
VALPROIC ACID LEVEL: 100 UG/ML (ref 50–125)
VALPROIC DATE LAST DOSE: NORMAL
VALPROIC DOSE AMOUNT: NORMAL
VALPROIC TIME LAST DOSE: NORMAL

## 2019-07-23 PROCEDURE — 80164 ASSAY DIPROPYLACETIC ACD TOT: CPT

## 2019-07-23 PROCEDURE — P9603 ONE-WAY ALLOW PRORATED MILES: HCPCS

## 2019-07-23 PROCEDURE — 36415 COLL VENOUS BLD VENIPUNCTURE: CPT

## 2019-08-28 ENCOUNTER — HOSPITAL ENCOUNTER (OUTPATIENT)
Dept: GENERAL RADIOLOGY | Age: 34
Discharge: HOME | End: 2019-08-30
Payer: MEDICARE

## 2019-08-28 ENCOUNTER — HOSPITAL ENCOUNTER (OUTPATIENT)
Age: 34
Discharge: HOME | End: 2019-08-30
Payer: MEDICARE

## 2019-08-28 DIAGNOSIS — L53.9 REDNESS: ICD-10-CM

## 2019-08-28 DIAGNOSIS — R60.9 EDEMA, UNSPECIFIED TYPE: ICD-10-CM

## 2019-08-28 PROCEDURE — 73630 X-RAY EXAM OF FOOT: CPT

## 2019-09-09 ENCOUNTER — HOSPITAL ENCOUNTER (OUTPATIENT)
Dept: NON INVASIVE DIAGNOSTICS | Age: 34
Discharge: HOME | End: 2019-09-09
Payer: MEDICARE

## 2019-09-09 PROCEDURE — 93005 ELECTROCARDIOGRAM TRACING: CPT | Performed by: FAMILY MEDICINE

## 2019-09-10 LAB
EKG ATRIAL RATE: 83 BPM
EKG P AXIS: 56 DEGREES
EKG P-R INTERVAL: 198 MS
EKG Q-T INTERVAL: 354 MS
EKG QRS DURATION: 108 MS
EKG QTC CALCULATION (BAZETT): 415 MS
EKG R AXIS: 17 DEGREES
EKG T AXIS: 36 DEGREES
EKG VENTRICULAR RATE: 83 BPM

## 2019-09-10 PROCEDURE — 93010 ELECTROCARDIOGRAM REPORT: CPT | Performed by: INTERNAL MEDICINE

## 2019-09-19 ENCOUNTER — HOSPITAL ENCOUNTER (OUTPATIENT)
Age: 34
Setting detail: SPECIMEN
Discharge: HOME | End: 2019-09-19
Payer: MEDICARE

## 2019-09-19 LAB
ALT SERPL-CCNC: 43 U/L (ref 5–41)
AST SERPL-CCNC: 31 U/L

## 2019-09-19 PROCEDURE — 84460 ALANINE AMINO (ALT) (SGPT): CPT

## 2019-09-19 PROCEDURE — 84450 TRANSFERASE (AST) (SGOT): CPT

## 2019-09-19 PROCEDURE — P9604 ONE-WAY ALLOW PRORATED TRIP: HCPCS

## 2019-09-19 PROCEDURE — 36415 COLL VENOUS BLD VENIPUNCTURE: CPT

## 2019-09-20 ENCOUNTER — HOSPITAL ENCOUNTER (OUTPATIENT)
Age: 34
Setting detail: SPECIMEN
Discharge: HOME | End: 2019-09-20
Payer: MEDICARE

## 2019-09-24 ENCOUNTER — HOSPITAL ENCOUNTER (OUTPATIENT)
Age: 34
Setting detail: SPECIMEN
Discharge: HOME | End: 2019-09-24
Payer: MEDICARE

## 2019-09-24 LAB
ABSOLUTE EOS #: 0.12 K/UL (ref 0–0.44)
ABSOLUTE IMMATURE GRANULOCYTE: 0.04 K/UL (ref 0–0.3)
ABSOLUTE LYMPH #: 1.95 K/UL (ref 1.1–3.7)
ABSOLUTE MONO #: 0.7 K/UL (ref 0.1–1.2)
ALBUMIN SERPL-MCNC: 4.7 G/DL (ref 3.5–5.2)
ALBUMIN/GLOBULIN RATIO: 2 (ref 1–2.5)
ALP BLD-CCNC: 60 U/L (ref 40–129)
ALT SERPL-CCNC: 59 U/L (ref 5–41)
ANION GAP SERPL CALCULATED.3IONS-SCNC: 18 MMOL/L (ref 9–17)
AST SERPL-CCNC: 57 U/L
BASOPHILS # BLD: 1 % (ref 0–2)
BASOPHILS ABSOLUTE: 0.05 K/UL (ref 0–0.2)
BILIRUB SERPL-MCNC: 0.49 MG/DL (ref 0.3–1.2)
BUN BLDV-MCNC: 14 MG/DL (ref 6–20)
BUN/CREAT BLD: 18 (ref 9–20)
CALCIUM SERPL-MCNC: 9.8 MG/DL (ref 8.6–10.4)
CHLORIDE BLD-SCNC: 100 MMOL/L (ref 98–107)
CHOLESTEROL/HDL RATIO: 4
CHOLESTEROL: 193 MG/DL
CO2: 23 MMOL/L (ref 20–31)
CREAT SERPL-MCNC: 0.79 MG/DL (ref 0.7–1.2)
DIFFERENTIAL TYPE: ABNORMAL
EOSINOPHILS RELATIVE PERCENT: 2 % (ref 1–4)
ESTIMATED AVERAGE GLUCOSE: 94 MG/DL
GFR AFRICAN AMERICAN: >60 ML/MIN
GFR NON-AFRICAN AMERICAN: >60 ML/MIN
GFR SERPL CREATININE-BSD FRML MDRD: ABNORMAL ML/MIN/{1.73_M2}
GFR SERPL CREATININE-BSD FRML MDRD: ABNORMAL ML/MIN/{1.73_M2}
GLUCOSE BLD-MCNC: 84 MG/DL (ref 70–99)
HBA1C MFR BLD: 4.9 % (ref 4.8–5.9)
HCT VFR BLD CALC: 47.6 % (ref 40.7–50.3)
HDLC SERPL-MCNC: 48 MG/DL
HEMOGLOBIN: 15.4 G/DL (ref 13–17)
IMMATURE GRANULOCYTES: 1 %
LDL CHOLESTEROL: 100 MG/DL (ref 0–130)
LYMPHOCYTES # BLD: 33 % (ref 24–43)
MCH RBC QN AUTO: 29.2 PG (ref 25.2–33.5)
MCHC RBC AUTO-ENTMCNC: 32.4 G/DL (ref 28.4–34.8)
MCV RBC AUTO: 90.2 FL (ref 82.6–102.9)
MONOCYTES # BLD: 12 % (ref 3–12)
NRBC AUTOMATED: 0 PER 100 WBC
PDW BLD-RTO: 13.5 % (ref 11.8–14.4)
PLATELET # BLD: 160 K/UL (ref 138–453)
PLATELET ESTIMATE: ABNORMAL
PMV BLD AUTO: 9.7 FL (ref 8.1–13.5)
POTASSIUM SERPL-SCNC: 4.2 MMOL/L (ref 3.7–5.3)
RBC # BLD: 5.28 M/UL (ref 4.21–5.77)
RBC # BLD: ABNORMAL 10*6/UL
SEG NEUTROPHILS: 51 % (ref 36–65)
SEGMENTED NEUTROPHILS ABSOLUTE COUNT: 2.97 K/UL (ref 1.5–8.1)
SODIUM BLD-SCNC: 141 MMOL/L (ref 135–144)
TOTAL PROTEIN: 7.1 G/DL (ref 6.4–8.3)
TRIGL SERPL-MCNC: 225 MG/DL
VALPROIC ACID LEVEL: 113 UG/ML (ref 50–125)
VALPROIC DATE LAST DOSE: NORMAL
VALPROIC DOSE AMOUNT: NORMAL
VALPROIC TIME LAST DOSE: NORMAL
VLDLC SERPL CALC-MCNC: ABNORMAL MG/DL (ref 1–30)
WBC # BLD: 5.8 K/UL (ref 3.5–11.3)
WBC # BLD: ABNORMAL 10*3/UL

## 2019-09-24 PROCEDURE — 85025 COMPLETE CBC W/AUTO DIFF WBC: CPT

## 2019-09-24 PROCEDURE — 80061 LIPID PANEL: CPT

## 2019-09-24 PROCEDURE — 80164 ASSAY DIPROPYLACETIC ACD TOT: CPT

## 2019-09-24 PROCEDURE — P9603 ONE-WAY ALLOW PRORATED MILES: HCPCS

## 2019-09-24 PROCEDURE — 80183 DRUG SCRN QUANT OXCARBAZEPIN: CPT

## 2019-09-24 PROCEDURE — 36415 COLL VENOUS BLD VENIPUNCTURE: CPT

## 2019-09-24 PROCEDURE — 80053 COMPREHEN METABOLIC PANEL: CPT

## 2019-09-24 PROCEDURE — 83036 HEMOGLOBIN GLYCOSYLATED A1C: CPT

## 2019-09-26 LAB — OXCARBAZEPINE: 25 UG/ML (ref 3–35)

## 2019-10-29 ENCOUNTER — HOSPITAL ENCOUNTER (OUTPATIENT)
Age: 34
Setting detail: SPECIMEN
Discharge: HOME | End: 2019-10-29
Payer: MEDICARE

## 2019-10-29 LAB
ALT SERPL-CCNC: 46 U/L (ref 5–41)
AST SERPL-CCNC: 34 U/L

## 2019-10-29 PROCEDURE — 36415 COLL VENOUS BLD VENIPUNCTURE: CPT

## 2019-10-29 PROCEDURE — 84450 TRANSFERASE (AST) (SGOT): CPT

## 2019-10-29 PROCEDURE — P9603 ONE-WAY ALLOW PRORATED MILES: HCPCS

## 2019-10-29 PROCEDURE — 84460 ALANINE AMINO (ALT) (SGPT): CPT

## 2019-11-18 ENCOUNTER — OFFICE VISIT (OUTPATIENT)
Dept: CARDIOLOGY | Age: 34
End: 2019-11-18
Payer: MEDICARE

## 2019-11-18 ENCOUNTER — HOSPITAL ENCOUNTER (OUTPATIENT)
Dept: NON INVASIVE DIAGNOSTICS | Age: 34
Discharge: HOME | End: 2019-11-18
Payer: MEDICARE

## 2019-11-18 VITALS
RESPIRATION RATE: 20 BRPM | HEART RATE: 85 BPM | WEIGHT: 242 LBS | HEIGHT: 64 IN | BODY MASS INDEX: 41.32 KG/M2 | OXYGEN SATURATION: 96 % | DIASTOLIC BLOOD PRESSURE: 73 MMHG | SYSTOLIC BLOOD PRESSURE: 139 MMHG

## 2019-11-18 DIAGNOSIS — I10 ESSENTIAL HYPERTENSION: Chronic | ICD-10-CM

## 2019-11-18 DIAGNOSIS — Q24.9 CONGENITAL HEART DISEASE: Primary | ICD-10-CM

## 2019-11-18 DIAGNOSIS — F99 CHRONIC MENTAL ILLNESS: Chronic | ICD-10-CM

## 2019-11-18 DIAGNOSIS — I35.9 AORTIC VALVE DISEASE: ICD-10-CM

## 2019-11-18 LAB
LV EF: 65 %
LVEF MODALITY: NORMAL

## 2019-11-18 PROCEDURE — 99203 OFFICE O/P NEW LOW 30 MIN: CPT | Performed by: INTERNAL MEDICINE

## 2019-11-18 PROCEDURE — 93306 TTE W/DOPPLER COMPLETE: CPT

## 2019-11-18 PROCEDURE — G8427 DOCREV CUR MEDS BY ELIG CLIN: HCPCS | Performed by: INTERNAL MEDICINE

## 2019-11-18 PROCEDURE — G8484 FLU IMMUNIZE NO ADMIN: HCPCS | Performed by: INTERNAL MEDICINE

## 2019-11-18 PROCEDURE — G8417 CALC BMI ABV UP PARAM F/U: HCPCS | Performed by: INTERNAL MEDICINE

## 2019-12-13 ENCOUNTER — HOSPITAL ENCOUNTER (OUTPATIENT)
Age: 34
Setting detail: SPECIMEN
Discharge: HOME | End: 2019-12-13
Payer: MEDICARE

## 2019-12-13 LAB
ALT SERPL-CCNC: 42 U/L (ref 5–41)
AST SERPL-CCNC: 31 U/L

## 2019-12-13 PROCEDURE — 36415 COLL VENOUS BLD VENIPUNCTURE: CPT

## 2019-12-13 PROCEDURE — P9603 ONE-WAY ALLOW PRORATED MILES: HCPCS

## 2019-12-13 PROCEDURE — 84460 ALANINE AMINO (ALT) (SGPT): CPT

## 2019-12-13 PROCEDURE — 84450 TRANSFERASE (AST) (SGOT): CPT

## 2020-03-06 ENCOUNTER — HOSPITAL ENCOUNTER (OUTPATIENT)
Age: 35
Setting detail: SPECIMEN
Discharge: HOME | End: 2020-03-06
Payer: MEDICARE

## 2020-03-06 LAB
ABSOLUTE EOS #: 0.09 K/UL (ref 0–0.44)
ABSOLUTE IMMATURE GRANULOCYTE: 0.03 K/UL (ref 0–0.3)
ABSOLUTE LYMPH #: 1.64 K/UL (ref 1.1–3.7)
ABSOLUTE MONO #: 0.3 K/UL (ref 0.1–1.2)
ALBUMIN SERPL-MCNC: 4.7 G/DL (ref 3.5–5.2)
ALBUMIN/GLOBULIN RATIO: 2.4 (ref 1–2.5)
ALP BLD-CCNC: 60 U/L (ref 40–129)
ALT SERPL-CCNC: 55 U/L (ref 5–41)
ANION GAP SERPL CALCULATED.3IONS-SCNC: 12 MMOL/L (ref 9–17)
AST SERPL-CCNC: 35 U/L
BASOPHILS # BLD: 1 % (ref 0–2)
BASOPHILS ABSOLUTE: 0.03 K/UL (ref 0–0.2)
BILIRUB SERPL-MCNC: 0.41 MG/DL (ref 0.3–1.2)
BUN BLDV-MCNC: 15 MG/DL (ref 6–20)
BUN/CREAT BLD: 19 (ref 9–20)
CALCIUM SERPL-MCNC: 9.2 MG/DL (ref 8.6–10.4)
CHLORIDE BLD-SCNC: 103 MMOL/L (ref 98–107)
CHOLESTEROL/HDL RATIO: 3.9
CHOLESTEROL: 197 MG/DL
CO2: 24 MMOL/L (ref 20–31)
CREAT SERPL-MCNC: 0.79 MG/DL (ref 0.7–1.2)
DIFFERENTIAL TYPE: ABNORMAL
EOSINOPHILS RELATIVE PERCENT: 2 % (ref 1–4)
ESTIMATED AVERAGE GLUCOSE: 100 MG/DL
GFR AFRICAN AMERICAN: >60 ML/MIN
GFR NON-AFRICAN AMERICAN: >60 ML/MIN
GFR SERPL CREATININE-BSD FRML MDRD: ABNORMAL ML/MIN/{1.73_M2}
GFR SERPL CREATININE-BSD FRML MDRD: ABNORMAL ML/MIN/{1.73_M2}
GLUCOSE BLD-MCNC: 146 MG/DL (ref 70–99)
HBA1C MFR BLD: 5.1 % (ref 4.8–5.9)
HCT VFR BLD CALC: 48.4 % (ref 40.7–50.3)
HDLC SERPL-MCNC: 50 MG/DL
HEMOGLOBIN: 15.4 G/DL (ref 13–17)
IMMATURE GRANULOCYTES: 1 %
LDL CHOLESTEROL: 118 MG/DL (ref 0–130)
LYMPHOCYTES # BLD: 31 % (ref 24–43)
MCH RBC QN AUTO: 29.2 PG (ref 25.2–33.5)
MCHC RBC AUTO-ENTMCNC: 31.8 G/DL (ref 28.4–34.8)
MCV RBC AUTO: 91.7 FL (ref 82.6–102.9)
MONOCYTES # BLD: 6 % (ref 3–12)
NRBC AUTOMATED: 0 PER 100 WBC
PDW BLD-RTO: 13.6 % (ref 11.8–14.4)
PLATELET # BLD: 148 K/UL (ref 138–453)
PLATELET ESTIMATE: ABNORMAL
PMV BLD AUTO: 10.2 FL (ref 8.1–13.5)
POTASSIUM SERPL-SCNC: 4.1 MMOL/L (ref 3.7–5.3)
RBC # BLD: 5.28 M/UL (ref 4.21–5.77)
RBC # BLD: ABNORMAL 10*6/UL
SEG NEUTROPHILS: 59 % (ref 36–65)
SEGMENTED NEUTROPHILS ABSOLUTE COUNT: 3.23 K/UL (ref 1.5–8.1)
SODIUM BLD-SCNC: 139 MMOL/L (ref 135–144)
TOTAL PROTEIN: 6.7 G/DL (ref 6.4–8.3)
TRIGL SERPL-MCNC: 146 MG/DL
VALPROIC ACID LEVEL: 108 UG/ML (ref 50–125)
VALPROIC DATE LAST DOSE: NORMAL
VALPROIC DOSE AMOUNT: NORMAL
VALPROIC TIME LAST DOSE: NORMAL
VLDLC SERPL CALC-MCNC: NORMAL MG/DL (ref 1–30)
WBC # BLD: 5.3 K/UL (ref 3.5–11.3)
WBC # BLD: ABNORMAL 10*3/UL

## 2020-03-06 PROCEDURE — 80183 DRUG SCRN QUANT OXCARBAZEPIN: CPT

## 2020-03-06 PROCEDURE — 85025 COMPLETE CBC W/AUTO DIFF WBC: CPT

## 2020-03-06 PROCEDURE — 80164 ASSAY DIPROPYLACETIC ACD TOT: CPT

## 2020-03-06 PROCEDURE — 80061 LIPID PANEL: CPT

## 2020-03-06 PROCEDURE — 36415 COLL VENOUS BLD VENIPUNCTURE: CPT

## 2020-03-06 PROCEDURE — 83036 HEMOGLOBIN GLYCOSYLATED A1C: CPT

## 2020-03-06 PROCEDURE — 80053 COMPREHEN METABOLIC PANEL: CPT

## 2020-03-06 PROCEDURE — P9604 ONE-WAY ALLOW PRORATED TRIP: HCPCS

## 2020-03-09 ENCOUNTER — HOSPITAL ENCOUNTER (OUTPATIENT)
Dept: NON INVASIVE DIAGNOSTICS | Age: 35
Discharge: HOME | End: 2020-03-09
Payer: MEDICARE

## 2020-03-09 LAB
EKG ATRIAL RATE: 96 BPM
EKG P AXIS: 58 DEGREES
EKG P-R INTERVAL: 212 MS
EKG Q-T INTERVAL: 328 MS
EKG QRS DURATION: 106 MS
EKG QTC CALCULATION (BAZETT): 414 MS
EKG R AXIS: 63 DEGREES
EKG T AXIS: 18 DEGREES
EKG VENTRICULAR RATE: 96 BPM
OXCARBAZEPINE: 30 UG/ML (ref 3–35)

## 2020-03-09 PROCEDURE — 93010 ELECTROCARDIOGRAM REPORT: CPT | Performed by: INTERNAL MEDICINE

## 2020-03-09 PROCEDURE — 93005 ELECTROCARDIOGRAM TRACING: CPT | Performed by: FAMILY MEDICINE

## 2020-04-30 ENCOUNTER — HOSPITAL ENCOUNTER (OUTPATIENT)
Age: 35
Setting detail: SPECIMEN
Discharge: HOME | End: 2020-04-30
Payer: MEDICARE

## 2020-04-30 PROCEDURE — P9603 ONE-WAY ALLOW PRORATED MILES: HCPCS

## 2020-04-30 PROCEDURE — 36415 COLL VENOUS BLD VENIPUNCTURE: CPT

## 2020-04-30 PROCEDURE — 80164 ASSAY DIPROPYLACETIC ACD TOT: CPT

## 2020-06-12 ENCOUNTER — HOSPITAL ENCOUNTER (OUTPATIENT)
Age: 35
Setting detail: SPECIMEN
Discharge: HOME | End: 2020-06-12
Payer: MEDICARE

## 2020-06-16 ENCOUNTER — HOSPITAL ENCOUNTER (OUTPATIENT)
Age: 35
Setting detail: SPECIMEN
Discharge: HOME | End: 2020-06-16
Payer: MEDICARE

## 2020-06-16 LAB
ALT SERPL-CCNC: 43 U/L (ref 5–41)
AST SERPL-CCNC: 34 U/L

## 2020-06-16 PROCEDURE — 36415 COLL VENOUS BLD VENIPUNCTURE: CPT

## 2020-06-16 PROCEDURE — 84460 ALANINE AMINO (ALT) (SGPT): CPT

## 2020-06-16 PROCEDURE — 84450 TRANSFERASE (AST) (SGOT): CPT

## 2020-06-16 PROCEDURE — P9604 ONE-WAY ALLOW PRORATED TRIP: HCPCS

## 2020-09-03 ENCOUNTER — OFFICE VISIT (OUTPATIENT)
Dept: CARDIOLOGY | Age: 35
End: 2020-09-03
Payer: MEDICARE

## 2020-09-03 ENCOUNTER — HOSPITAL ENCOUNTER (OUTPATIENT)
Dept: GENERAL RADIOLOGY | Age: 35
Discharge: HOME | End: 2020-09-05
Payer: MEDICARE

## 2020-09-03 VITALS
WEIGHT: 247.6 LBS | SYSTOLIC BLOOD PRESSURE: 112 MMHG | OXYGEN SATURATION: 100 % | RESPIRATION RATE: 16 BRPM | DIASTOLIC BLOOD PRESSURE: 72 MMHG | HEART RATE: 89 BPM | BODY MASS INDEX: 42.5 KG/M2

## 2020-09-03 PROCEDURE — 73140 X-RAY EXAM OF FINGER(S): CPT

## 2020-09-03 PROCEDURE — 1036F TOBACCO NON-USER: CPT | Performed by: INTERNAL MEDICINE

## 2020-09-03 PROCEDURE — 99213 OFFICE O/P EST LOW 20 MIN: CPT | Performed by: INTERNAL MEDICINE

## 2020-09-03 PROCEDURE — G8417 CALC BMI ABV UP PARAM F/U: HCPCS | Performed by: INTERNAL MEDICINE

## 2020-09-03 PROCEDURE — 99211 OFF/OP EST MAY X REQ PHY/QHP: CPT | Performed by: INTERNAL MEDICINE

## 2020-09-03 PROCEDURE — G8427 DOCREV CUR MEDS BY ELIG CLIN: HCPCS | Performed by: INTERNAL MEDICINE

## 2020-09-03 NOTE — PROGRESS NOTES
rectally every 6 hours as needed for Nausea      acetaminophen (TYLENOL) 650 MG suppository Place 650 mg rectally every 4 hours as needed for Fever      ibuprofen (ADVIL;MOTRIN) 600 MG tablet Take 600 mg by mouth every 6 hours as needed for Fever      LORazepam (ATIVAN) 1 MG tablet Take 1 mg by mouth.  docusate sodium (COLACE) 100 MG capsule Take 1 capsule by mouth 2 times daily as needed for Constipation 20 capsule 0    OXcarbazepine (TRILEPTAL) 600 MG tablet Take 600 mg by mouth 3 times daily      QUEtiapine (SEROQUEL) 200 MG tablet Take 500 mg by mouth Daily with lunch       acetaminophen (TYLENOL) 325 MG tablet Take 650 mg by mouth every 4 hours as needed for Pain or Fever      loratadine (CLARITIN) 10 MG tablet Take 10 mg by mouth daily as needed      busPIRone (BUSPAR) 30 MG tablet take 1 tablet by mouth twice a day  0    enalapril (VASOTEC) 10 MG tablet Take 10 mg by mouth daily          FAMILY HISTORY: family history includes Diabetes in his father; Emphysema in his mother; High Blood Pressure in his father; Other in his father, mother, and sister. Physical Examination:      /72 (Site: Left Upper Arm, Position: Sitting, Cuff Size: Medium Adult)   Pulse 89   Resp 16   Wt 247 lb 9.6 oz (112.3 kg)   SpO2 100%   BMI 42.50 kg/m²  Body mass index is 42.5 kg/m². Constitutional: He is oriented to person, place, and time. He appears well-developed and well-nourished. In no acute distress. HEENT: Normocephalic and atraumatic. No JVD present. Carotid bruit is not present. No mass and no thyromegaly present. No lymphadenopathy present. Cardiovascular: Normal rate, regular rhythm, normal heart sounds. Exam reveals no gallop and no friction rubs. 2/6 systolic murmur, 2nd intercostal space on the LEFT just lateral to the sternum. Pulmonary/Chest: Effort normal and breath sounds normal. No respiratory distress. He has no wheezes, rhonchi or rales. Abdominal: Soft, non-tender.  Bowel sounds and aorta are normal. He exhibits no organomegaly, mass or bruit. Extremities: No edema. No cyanosis or clubbing. 2+ radial and carotid pulses. Distal extremity pulses: 2+ bilaterally. .  Neurological: He is alert and oriented to person, place, and time. No evidence of gross cranial nerve deficit. Coordination appeared normal.   Skin: Skin is warm and dry. There is no rash or diaphoresis. Psychiatric: He has a normal mood and affect. His speech is normal and behavior is normal.      MOST RECENT LABS ON RECORD:   Lab Results   Component Value Date    WBC 5.3 03/06/2020    HGB 15.4 03/06/2020    HCT 48.4 03/06/2020     03/06/2020    CHOL 197 03/06/2020    TRIG 146 03/06/2020    HDL 50 03/06/2020    ALT 43 (H) 06/16/2020    AST 34 06/16/2020     03/06/2020    K 4.1 03/06/2020     03/06/2020    CREATININE 0.79 03/06/2020    BUN 15 03/06/2020    CO2 24 03/06/2020    TSH 1.785 11/13/2012    LABA1C 5.1 03/06/2020         ASSESSMENT:     1. Essential hypertension    2. Aortic valve disorder       PLAN:      · Congenital aortic valve disease: asymptomatic   · Moderate aortic regurgitation and mild aortic stenosis by echo from prior evaluation. · Anomalous origin of left subclavian artery. · Currently asymptomatic. · Beta Blocker: Continue Metoprolol tartrate (Lopressor) 25 mg bid. · ACE Inibitor/ARB: Continue enalapril (Vasotec) 10 mg daily   · Additional testing: Additional Testing List: I ordered a echocardiogram to better assess for the etiology of this problem and to help guide future management      Essential Hypertension: Controlled  · Beta Blocker: Continue Metoprolol tartrate (Lopressor) 25 mg bid. · ACE Inibitor/ARB: Continue enalapril (Vasotec) 10 mg daily   · Diuretics: Not indicated at this time. · Additional testing: Follow-up repeat echo. In the meantime I asked him to continue taking his other medications and follow up with you as previously scheduled.     FOLLOW UP:   I told Mr. Frances Sawant to call my office if he had any problems, but otherwise told him to Return in about 1 year (around 9/3/2021). However, I would be happy to see him sooner should the need arise. Once again, thank you for allowing me to participate in this patients care. Please do not hesitate to contact me could I be of further assistance. Sincerely,  Mir Camargo MD, F.A.C.C. [facility] Cardiology Specialist    [address] [place] [address], [address] [place] [address]  Phone: [phone], Fax: [phone]     I believe that the risk of significant morbidity and mortality related to the patient's current medical conditions are: intermediate-high. The documentation recorded by the scribe, accurately and completely reflects the services I personally performed and the decisions made by me. Mir Camargo MD, F.A.C.C.  September 3, 2020

## 2020-09-11 ENCOUNTER — HOSPITAL ENCOUNTER (OUTPATIENT)
Age: 35
Setting detail: SPECIMEN
Discharge: HOME | End: 2020-09-11
Payer: MEDICARE

## 2020-09-11 LAB
ABSOLUTE EOS #: 0.11 K/UL (ref 0–0.44)
ABSOLUTE IMMATURE GRANULOCYTE: 0.04 K/UL (ref 0–0.3)
ABSOLUTE LYMPH #: 2.32 K/UL (ref 1.1–3.7)
ABSOLUTE MONO #: 0.88 K/UL (ref 0.1–1.2)
ALBUMIN SERPL-MCNC: 4.9 G/DL (ref 3.5–5.2)
ALBUMIN/GLOBULIN RATIO: 2.7 (ref 1–2.5)
ALP BLD-CCNC: 67 U/L (ref 40–129)
ALT SERPL-CCNC: 52 U/L (ref 5–41)
ANION GAP SERPL CALCULATED.3IONS-SCNC: 16 MMOL/L (ref 9–17)
AST SERPL-CCNC: 30 U/L
BASOPHILS # BLD: 1 % (ref 0–2)
BASOPHILS ABSOLUTE: 0.07 K/UL (ref 0–0.2)
BILIRUB SERPL-MCNC: 0.37 MG/DL (ref 0.3–1.2)
BUN BLDV-MCNC: 17 MG/DL (ref 6–20)
BUN/CREAT BLD: 22 (ref 9–20)
CALCIUM SERPL-MCNC: 9.5 MG/DL (ref 8.6–10.4)
CHLORIDE BLD-SCNC: 102 MMOL/L (ref 98–107)
CO2: 22 MMOL/L (ref 20–31)
CREAT SERPL-MCNC: 0.79 MG/DL (ref 0.7–1.2)
DIFFERENTIAL TYPE: ABNORMAL
EOSINOPHILS RELATIVE PERCENT: 2 % (ref 1–4)
ESTIMATED AVERAGE GLUCOSE: 105 MG/DL
GFR AFRICAN AMERICAN: >60 ML/MIN
GFR NON-AFRICAN AMERICAN: >60 ML/MIN
GFR SERPL CREATININE-BSD FRML MDRD: ABNORMAL ML/MIN/{1.73_M2}
GFR SERPL CREATININE-BSD FRML MDRD: ABNORMAL ML/MIN/{1.73_M2}
GLUCOSE BLD-MCNC: 97 MG/DL (ref 70–99)
HBA1C MFR BLD: 5.3 % (ref 4–6)
HCT VFR BLD CALC: 46.7 % (ref 40.7–50.3)
HEMOGLOBIN: 15.1 G/DL (ref 13–17)
IMMATURE GRANULOCYTES: 1 %
LYMPHOCYTES # BLD: 32 % (ref 24–43)
MCH RBC QN AUTO: 29 PG (ref 25.2–33.5)
MCHC RBC AUTO-ENTMCNC: 32.3 G/DL (ref 28.4–34.8)
MCV RBC AUTO: 89.8 FL (ref 82.6–102.9)
MONOCYTES # BLD: 12 % (ref 3–12)
NRBC AUTOMATED: 0 PER 100 WBC
PDW BLD-RTO: 13 % (ref 11.8–14.4)
PLATELET # BLD: 179 K/UL (ref 138–453)
PLATELET ESTIMATE: ABNORMAL
PMV BLD AUTO: 10.2 FL (ref 8.1–13.5)
POTASSIUM SERPL-SCNC: 4.2 MMOL/L (ref 3.7–5.3)
RBC # BLD: 5.2 M/UL (ref 4.21–5.77)
RBC # BLD: ABNORMAL 10*6/UL
SEG NEUTROPHILS: 52 % (ref 36–65)
SEGMENTED NEUTROPHILS ABSOLUTE COUNT: 3.8 K/UL (ref 1.5–8.1)
SODIUM BLD-SCNC: 140 MMOL/L (ref 135–144)
TOTAL PROTEIN: 6.7 G/DL (ref 6.4–8.3)
VALPROIC ACID LEVEL: 101 UG/ML (ref 50–125)
VALPROIC DATE LAST DOSE: NORMAL
VALPROIC DOSE AMOUNT: NORMAL
VALPROIC TIME LAST DOSE: NORMAL
WBC # BLD: 7.2 K/UL (ref 3.5–11.3)
WBC # BLD: ABNORMAL 10*3/UL

## 2020-09-11 PROCEDURE — 80053 COMPREHEN METABOLIC PANEL: CPT

## 2020-09-11 PROCEDURE — 36415 COLL VENOUS BLD VENIPUNCTURE: CPT

## 2020-09-11 PROCEDURE — 85025 COMPLETE CBC W/AUTO DIFF WBC: CPT

## 2020-09-11 PROCEDURE — 80164 ASSAY DIPROPYLACETIC ACD TOT: CPT

## 2020-09-11 PROCEDURE — 83036 HEMOGLOBIN GLYCOSYLATED A1C: CPT

## 2020-09-11 PROCEDURE — P9603 ONE-WAY ALLOW PRORATED MILES: HCPCS

## 2020-09-11 PROCEDURE — 80183 DRUG SCRN QUANT OXCARBAZEPIN: CPT

## 2020-09-14 ENCOUNTER — HOSPITAL ENCOUNTER (OUTPATIENT)
Dept: NON INVASIVE DIAGNOSTICS | Age: 35
Discharge: HOME | End: 2020-09-14
Payer: MEDICARE

## 2020-09-14 LAB
EKG ATRIAL RATE: 120 BPM
EKG P AXIS: 58 DEGREES
EKG P-R INTERVAL: 192 MS
EKG Q-T INTERVAL: 292 MS
EKG QRS DURATION: 102 MS
EKG QTC CALCULATION (BAZETT): 412 MS
EKG R AXIS: 76 DEGREES
EKG T AXIS: -7 DEGREES
EKG VENTRICULAR RATE: 120 BPM

## 2020-09-14 PROCEDURE — 93010 ELECTROCARDIOGRAM REPORT: CPT | Performed by: PEDIATRICS

## 2020-09-14 PROCEDURE — 93005 ELECTROCARDIOGRAM TRACING: CPT | Performed by: FAMILY MEDICINE

## 2020-09-15 LAB — OXCARBAZEPINE: 24 UG/ML (ref 3–35)

## 2020-09-23 ENCOUNTER — TELEPHONE (OUTPATIENT)
Dept: CARDIOLOGY | Age: 35
End: 2020-09-23

## 2020-09-23 NOTE — TELEPHONE ENCOUNTER
Per abnormal EKG Dr José Luis Leary would like Mr. Jani Loveless to increase Metoprolol to 50 mg twice daily. Also get his echo done this week he is scheduled 9/28//2020. Talked to [facility] at [facility] [place] she verbalized understanding and took orders. He is scheduled for Oct 1 for office visit.

## 2020-09-28 ENCOUNTER — HOSPITAL ENCOUNTER (OUTPATIENT)
Dept: NON INVASIVE DIAGNOSTICS | Age: 35
Discharge: HOME | End: 2020-09-28
Payer: MEDICARE

## 2020-09-28 LAB
LV EF: 60 %
LVEF MODALITY: NORMAL

## 2020-09-28 PROCEDURE — 93306 TTE W/DOPPLER COMPLETE: CPT

## 2020-09-29 ENCOUNTER — TELEPHONE (OUTPATIENT)
Dept: CARDIOLOGY | Age: 35
End: 2020-09-29

## 2020-09-29 NOTE — TELEPHONE ENCOUNTER
----- Message from Adam Elliott MD sent at 9/28/2020  9:18 PM EDT -----  Echo is okay. Please let me see him in 6 months rather than a year because the aortic valve gradient is getting a tiny bit higher.   Thank you

## 2020-10-01 ENCOUNTER — OFFICE VISIT (OUTPATIENT)
Dept: CARDIOLOGY | Age: 35
End: 2020-10-01
Payer: MEDICARE

## 2020-10-01 VITALS
WEIGHT: 251 LBS | SYSTOLIC BLOOD PRESSURE: 141 MMHG | HEIGHT: 64 IN | DIASTOLIC BLOOD PRESSURE: 71 MMHG | HEART RATE: 102 BPM | RESPIRATION RATE: 18 BRPM | OXYGEN SATURATION: 98 % | BODY MASS INDEX: 42.85 KG/M2

## 2020-10-01 PROCEDURE — 99211 OFF/OP EST MAY X REQ PHY/QHP: CPT | Performed by: INTERNAL MEDICINE

## 2020-10-01 PROCEDURE — G8427 DOCREV CUR MEDS BY ELIG CLIN: HCPCS | Performed by: INTERNAL MEDICINE

## 2020-10-01 PROCEDURE — 99213 OFFICE O/P EST LOW 20 MIN: CPT | Performed by: INTERNAL MEDICINE

## 2020-10-01 PROCEDURE — 1036F TOBACCO NON-USER: CPT | Performed by: INTERNAL MEDICINE

## 2020-10-01 PROCEDURE — G8484 FLU IMMUNIZE NO ADMIN: HCPCS | Performed by: INTERNAL MEDICINE

## 2020-10-01 PROCEDURE — G8417 CALC BMI ABV UP PARAM F/U: HCPCS | Performed by: INTERNAL MEDICINE

## 2020-10-01 NOTE — PROGRESS NOTES
Meg Pacheco am scribing for and in the presence of Pro Dickson MD, F.A.C.C. Patient: Carlos Harris  : 1985  Date of Visit: 2020    REASON FOR VISIT / CONSULTATION: Follow-up (Hx: aortic valve disease, HTN. F/u abn EKG and F/u Echo. Pt is feeling good. Some chest pain. Denies: SOB, dizziness, lightheaded, palpitations.)      History of Present Illness:        Dear Macy Mcburney, MD    I had the pleasure of seeing your patient Carlos Harris for his yearly follow-up. As you know, Mr. Sofia Olson is a 37 y.o. male with profound cognitive impairment and congenital aortic valve disease is with his most recent echo showing aortic regurgitation and aortic stenosis. Also his CTA on 2009 showed no significant aortic coarctation with anomalous left subclavian artery. He is here today for follow-up. As per home health aide, there is no new symptoms. He is fairly active in the facility without significant exertional symptoms. Unfortunately, patient cannot give any meaningful history. Other medical problems include bipolar disorder, hypertension, explosive personality disorder and seizure. Echo done on 2019: EF > 65%, The left ventricular cavity size is within normal limits and the left ventricular wall thickness is mildly increased. No definite specific wall motion abnormalities were identified. The mean aortic valve gradient is 20 mmHg consistent with moderate aortic stenosis. Moderate aortic insufficiency was seen. Evidence of moderate (grade II) diastolic dysfunction is seen. EKG done on 3/9/2020: Sinus rhythm with 1st degree A-V block    EKG done on 2020: sinus tachycardia, biatrial enlargement    Echo done on 2020: EF 60%, The left ventricular cavity size is within normal limits and the left ventricular wall thickness is moderately increased. No definite specific wall motion abnormalities were identified.  The mean aortic valve gradient is 20 mmHg consistent with moderate aortic stenosis. Moderate aortic insufficiency was seen. Evidence of mild (grade I) diastolic dysfunction is seen. Mr. Jermaine Concepcion is here today for a follow up. He reports doing well today. He says he has no pain except his right knee. Again history is markedly limited by the patient profound mental delay. He denies any chest pain, pressure or tightness. He denies any shortness of breath. He denies any palpitations. He has no dizziness or falling episodes. PAST MEDICAL HISTORY:        Past Medical History:   Diagnosis Date    Aortic valve defect birth   Jennifer An Aortic valve stenosis     (unknown date of diagnosis, in med record from [place] [address])    Bipolar disorder [facility])     (unknown date of diagnosis, in med record from [place] [address])    Chronic mental illness     Coarctation of aorta     (unknown date of diagnosis, in med record from [place] [address])    Depression     (unknown date of diagnosis, in med record from [place] [address])   [facility] personality disorder ([facility] [address]     (unknown date of diagnosis, in med record from [place] [address])   Jerel Tan 1615 of both feet 01/18/2018    hospitalized at [facility]. V's - required debridement of both feet    History of seizure disorder     \"had seizures when younger d/t abilify\" (med record from [facility])    Hypertension     (unknown date of diagnosis, in med record from [place] [address])       Tk 51: Abilify [aripiprazole]; Codeine; and Lithium REVIEW OF SYSTEMS: Review of system is limited by the patient developmental delay.      Past Surgical History:   Procedure Laterality Date    CARDIAC SURGERY  1985    AT BIRTH    LUNG SURGERY  09/2014    lung mass removal (med record from [facility])    PILONIDAL CYST DRAINAGE  2005    Social History:  Social History     Tobacco Use    Smoking status: Never Smoker    Smokeless tobacco: Never Used   Substance Use Topics    Alcohol use: No    Drug use: No        CURRENT MEDICATIONS:        Outpatient Medications Marked as Taking for the 10/1/20 encounter (Office Visit) with Kj Mota MD   Medication Sig Dispense Refill    mineral oil-hydrophilic petrolatum (AQUAPHOR) ointment Apply topically 2 times daily Apply topically to bilateral feet      divalproex (DEPAKOTE) 125 MG DR tablet Take 375 mg by mouth every morning Pt is to take 125mg and a 250mg tablet every morning      QUEtiapine (SEROQUEL) 100 MG tablet Take 100 mg by mouth 2 times daily      promethazine (PHENERGAN) 25 MG suppository Place 25 mg rectally every 6 hours as needed for Nausea      acetaminophen (TYLENOL) 650 MG suppository Place 650 mg rectally every 4 hours as needed for Fever      ibuprofen (ADVIL;MOTRIN) 600 MG tablet Take 600 mg by mouth every 6 hours as needed for Fever      LORazepam (ATIVAN) 1 MG tablet Take 1 mg by mouth.  docusate sodium (COLACE) 100 MG capsule Take 1 capsule by mouth 2 times daily as needed for Constipation 20 capsule 0    divalproex (DEPAKOTE) 250 MG DR tablet Take 250 mg by mouth daily (with breakfast)      divalproex (DEPAKOTE) 250 MG DR tablet Take 1,250 mg by mouth nightly      OXcarbazepine (TRILEPTAL) 600 MG tablet Take 600 mg by mouth 3 times daily      QUEtiapine (SEROQUEL) 200 MG tablet Take 500 mg by mouth Daily with lunch       acetaminophen (TYLENOL) 325 MG tablet Take 650 mg by mouth every 4 hours as needed for Pain or Fever      loratadine (CLARITIN) 10 MG tablet Take 10 mg by mouth daily as needed      busPIRone (BUSPAR) 30 MG tablet take 1 tablet by mouth twice a day  0    enalapril (VASOTEC) 10 MG tablet Take 10 mg by mouth daily          FAMILY HISTORY: family history includes Diabetes in his father; Emphysema in his mother; High Blood Pressure in his father; Other in his father, mother, and sister.      Physical Examination:      BP (!) 141/71 (Site: Right Upper Arm, Position: Sitting, Cuff Size: Large Adult)   Pulse 102   Resp 18   Ht 5' 4.02\" (1.626 m)   Wt 251 lb (113.9 kg)   SpO2 98%   BMI 43.06 kg/m²  Body mass index is 43.06 kg/m². Constitutional: Looks comfortable. No acute distress. Terence Luo HEENT: Normocephalic and atraumatic. No JVD present. Carotid bruit is not present. No mass and no thyromegaly present. No lymphadenopathy present. Cardiovascular: Normal rate, regular rhythm, normal heart sounds. Exam reveals no gallop and no friction rubs. 2/6 systolic murmur, 2nd intercostal space on the LEFT just lateral to the sternum. Pulmonary/Chest: Effort normal and breath sounds normal. No respiratory distress. He has no wheezes, rhonchi or rales. Abdominal: Soft, non-tender. Bowel sounds and aorta are normal. He exhibits no organomegaly, mass or bruit. Extremities: No edema. No cyanosis or clubbing. 2+ radial and carotid pulses. Distal extremity pulses: 2+ bilaterally. .  Neurological: Patient has significant developmental delay but able to answer yes/no questions. Skin: Skin is warm and dry. There is no rash or diaphoresis. Psychiatric: He is pleasant today. MOST RECENT LABS ON RECORD:   Lab Results   Component Value Date    WBC 7.2 09/11/2020    HGB 15.1 09/11/2020    HCT 46.7 09/11/2020     09/11/2020    CHOL 197 03/06/2020    TRIG 146 03/06/2020    HDL 50 03/06/2020    ALT 52 (H) 09/11/2020    AST 30 09/11/2020     09/11/2020    K 4.2 09/11/2020     09/11/2020    CREATININE 0.79 09/11/2020    BUN 17 09/11/2020    CO2 22 09/11/2020    TSH 1.785 11/13/2012    LABA1C 5.3 09/11/2020         ASSESSMENT:     1. Aortic valve disorder    2. Congenital heart disease    3. Aortic valve disease    4. Essential hypertension    5.  Sinus tachycardia       PLAN:      · Congenital aortic valve disease: asymptomatic   · Moderate aortic regurgitation and moderate aortic stenosis VIA on Echo 9/28/20  · Anomalous origin of left subclavian artery. · Currently asymptomatic. · Beta Blocker: START Metoprolol tartrate (Lopressor) 25 mg bid. I also discussed the potential side effects of this medication including lightheadedness and dizziness and instructed them to stop the medication of this occurs and call our office if this occurs. · ACE Inibitor/ARB: Continue enalapril (Vasotec) 10 mg daily     · Essential Hypertension: Controlled  · Beta Blocker: START Metoprolol tartrate (Lopressor) 25 mg bid. · ACE Inibitor/ARB: Continue enalapril (Vasotec) 10 mg daily   · Diuretics: Not indicated at this time.  Sinus tachycardia: as seen on his EKG done on 9/14/2020, heart rate of 120 bpm  · Beta Blocker: START Metoprolol tartrate (Lopressor) 25 mg bid. ·   In the meantime I asked him to continue taking his other medications and follow up with you as previously scheduled. FOLLOW UP:   I told Mr. Adela parekh to call my office if he had any problems, but otherwise told him to Return in about 6 months (around 4/1/2021). However, I would be happy to see him sooner should the need arise. Once again, thank you for allowing me to participate in this patients care. Please do not hesitate to contact me could I be of further assistance. Sincerely,  Isaak Logan MD, F.A.C.C. [facility] Cardiology Specialist    [address]  Phone: [phone], Fax: [phone]     I believe that the risk of significant morbidity and mortality related to the patient's current medical conditions are: Intermediate. 15  minutes were spent with the patient and all of their questions were answered. The documentation recorded by the scribe, accurately and completely reflects the services I personally performed and the decisions made by me. Isaak Logan MD, F.A.C.C.  October 1, 2020

## 2020-11-10 ENCOUNTER — HOSPITAL ENCOUNTER (OUTPATIENT)
Age: 35
Setting detail: SPECIMEN
Discharge: HOME | End: 2020-11-10
Payer: MEDICARE

## 2020-11-10 PROCEDURE — U0003 INFECTIOUS AGENT DETECTION BY NUCLEIC ACID (DNA OR RNA); SEVERE ACUTE RESPIRATORY SYNDROME CORONAVIRUS 2 (SARS-COV-2) (CORONAVIRUS DISEASE [COVID-19]), AMPLIFIED PROBE TECHNIQUE, MAKING USE OF HIGH THROUGHPUT TECHNOLOGIES AS DESCRIBED BY CMS-2020-01-R: HCPCS

## 2020-11-14 LAB — SARS-COV-2, NAA: NOT DETECTED

## 2020-12-01 ENCOUNTER — HOSPITAL ENCOUNTER (OUTPATIENT)
Dept: GENERAL RADIOLOGY | Age: 35
Discharge: HOME | End: 2020-12-03
Payer: MEDICARE

## 2020-12-01 PROCEDURE — 73110 X-RAY EXAM OF WRIST: CPT

## 2020-12-04 ENCOUNTER — HOSPITAL ENCOUNTER (OUTPATIENT)
Age: 35
Setting detail: SPECIMEN
Discharge: HOME | End: 2020-12-04
Payer: MEDICARE

## 2020-12-04 LAB
ALT SERPL-CCNC: 65 U/L (ref 5–41)
AST SERPL-CCNC: 43 U/L

## 2020-12-04 PROCEDURE — 84460 ALANINE AMINO (ALT) (SGPT): CPT

## 2020-12-04 PROCEDURE — P9604 ONE-WAY ALLOW PRORATED TRIP: HCPCS

## 2020-12-04 PROCEDURE — 36415 COLL VENOUS BLD VENIPUNCTURE: CPT

## 2020-12-04 PROCEDURE — 84450 TRANSFERASE (AST) (SGOT): CPT

## 2020-12-25 ENCOUNTER — HOSPITAL ENCOUNTER (OUTPATIENT)
Age: 35
Discharge: HOME | End: 2020-12-25
Payer: MEDICARE

## 2020-12-25 LAB
AMMONIA: 70 UMOL/L (ref 16–60)
VALPROIC ACID LEVEL: 96 UG/ML (ref 50–125)
VALPROIC DATE LAST DOSE: NORMAL
VALPROIC DOSE AMOUNT: NORMAL
VALPROIC TIME LAST DOSE: NORMAL

## 2020-12-25 PROCEDURE — 80183 DRUG SCRN QUANT OXCARBAZEPIN: CPT

## 2020-12-25 PROCEDURE — 36415 COLL VENOUS BLD VENIPUNCTURE: CPT

## 2020-12-25 PROCEDURE — 82140 ASSAY OF AMMONIA: CPT

## 2020-12-25 PROCEDURE — 80164 ASSAY DIPROPYLACETIC ACD TOT: CPT

## 2020-12-27 LAB — OXCARBAZEPINE: 31 UG/ML (ref 3–35)

## 2020-12-28 ENCOUNTER — HOSPITAL ENCOUNTER (OUTPATIENT)
Dept: CT IMAGING | Age: 35
Discharge: HOME | End: 2020-12-30
Payer: MEDICARE

## 2021-01-05 ENCOUNTER — HOSPITAL ENCOUNTER (OUTPATIENT)
Dept: LAB | Age: 36
Discharge: HOME | End: 2021-01-05
Payer: MEDICARE

## 2021-01-05 ENCOUNTER — HOSPITAL ENCOUNTER (OUTPATIENT)
Age: 36
Setting detail: SPECIMEN
Discharge: HOME | End: 2021-01-05
Payer: MEDICARE

## 2021-01-05 LAB
ALT SERPL-CCNC: 60 U/L (ref 5–41)
AMMONIA: 44 UMOL/L (ref 16–60)
AST SERPL-CCNC: 53 U/L

## 2021-01-05 PROCEDURE — 82140 ASSAY OF AMMONIA: CPT

## 2021-01-05 PROCEDURE — 84450 TRANSFERASE (AST) (SGOT): CPT

## 2021-01-05 PROCEDURE — 84460 ALANINE AMINO (ALT) (SGPT): CPT

## 2021-01-05 PROCEDURE — 36415 COLL VENOUS BLD VENIPUNCTURE: CPT

## 2021-01-06 ENCOUNTER — HOSPITAL ENCOUNTER (OUTPATIENT)
Age: 36
Setting detail: SPECIMEN
Discharge: HOME | End: 2021-01-06
Payer: MEDICARE

## 2021-01-08 ENCOUNTER — HOSPITAL ENCOUNTER (OUTPATIENT)
Dept: CT IMAGING | Age: 36
Discharge: HOME | End: 2021-01-10
Payer: MEDICARE

## 2021-01-08 PROCEDURE — 6360000004 HC RX CONTRAST MEDICATION: Performed by: INTERNAL MEDICINE

## 2021-01-08 PROCEDURE — 70470 CT HEAD/BRAIN W/O & W/DYE: CPT

## 2021-01-08 RX ADMIN — IOPAMIDOL 75 ML: 755 INJECTION, SOLUTION INTRAVENOUS at 13:55

## 2021-02-23 ENCOUNTER — HOSPITAL ENCOUNTER (OUTPATIENT)
Age: 36
Setting detail: SPECIMEN
Discharge: HOME | End: 2021-02-23
Payer: MEDICARE

## 2021-02-23 LAB
VALPROIC ACID LEVEL: 105 UG/ML (ref 50–125)
VALPROIC DATE LAST DOSE: NORMAL
VALPROIC DOSE AMOUNT: NORMAL
VALPROIC TIME LAST DOSE: NORMAL

## 2021-02-23 PROCEDURE — P9603 ONE-WAY ALLOW PRORATED MILES: HCPCS

## 2021-02-23 PROCEDURE — 36415 COLL VENOUS BLD VENIPUNCTURE: CPT

## 2021-02-23 PROCEDURE — 80164 ASSAY DIPROPYLACETIC ACD TOT: CPT

## 2021-03-12 ENCOUNTER — HOSPITAL ENCOUNTER (OUTPATIENT)
Age: 36
Setting detail: SPECIMEN
Discharge: HOME | End: 2021-03-12
Payer: MEDICARE

## 2021-03-12 LAB
ABSOLUTE EOS #: 0.07 K/UL (ref 0–0.44)
ABSOLUTE IMMATURE GRANULOCYTE: 0.03 K/UL (ref 0–0.3)
ABSOLUTE LYMPH #: 1.33 K/UL (ref 1.1–3.7)
ABSOLUTE MONO #: 0.67 K/UL (ref 0.1–1.2)
ALBUMIN SERPL-MCNC: 4.4 G/DL (ref 3.5–5.2)
ALBUMIN/GLOBULIN RATIO: 2.3 (ref 1–2.5)
ALP BLD-CCNC: 59 U/L (ref 40–129)
ALT SERPL-CCNC: 35 U/L (ref 5–41)
ANION GAP SERPL CALCULATED.3IONS-SCNC: 9 MMOL/L (ref 9–17)
AST SERPL-CCNC: 30 U/L
BASOPHILS # BLD: 1 % (ref 0–2)
BASOPHILS ABSOLUTE: 0.05 K/UL (ref 0–0.2)
BILIRUB SERPL-MCNC: 0.43 MG/DL (ref 0.3–1.2)
BUN BLDV-MCNC: 17 MG/DL (ref 6–20)
BUN/CREAT BLD: 19 (ref 9–20)
CALCIUM SERPL-MCNC: 9.5 MG/DL (ref 8.6–10.4)
CHLORIDE BLD-SCNC: 103 MMOL/L (ref 98–107)
CHOLESTEROL/HDL RATIO: 3.5
CHOLESTEROL: 149 MG/DL
CO2: 28 MMOL/L (ref 20–31)
CREAT SERPL-MCNC: 0.88 MG/DL (ref 0.7–1.2)
DIFFERENTIAL TYPE: ABNORMAL
EOSINOPHILS RELATIVE PERCENT: 1 % (ref 1–4)
ESTIMATED AVERAGE GLUCOSE: 94 MG/DL
GFR AFRICAN AMERICAN: >60 ML/MIN
GFR NON-AFRICAN AMERICAN: >60 ML/MIN
GFR SERPL CREATININE-BSD FRML MDRD: ABNORMAL ML/MIN/{1.73_M2}
GFR SERPL CREATININE-BSD FRML MDRD: ABNORMAL ML/MIN/{1.73_M2}
GLUCOSE BLD-MCNC: 94 MG/DL (ref 70–99)
HBA1C MFR BLD: 4.9 % (ref 4–6)
HCT VFR BLD CALC: 45.3 % (ref 40.7–50.3)
HDLC SERPL-MCNC: 42 MG/DL
HEMOGLOBIN: 14.2 G/DL (ref 13–17)
IMMATURE GRANULOCYTES: 1 %
LDL CHOLESTEROL: 81 MG/DL (ref 0–130)
LYMPHOCYTES # BLD: 20 % (ref 24–43)
MCH RBC QN AUTO: 29.4 PG (ref 25.2–33.5)
MCHC RBC AUTO-ENTMCNC: 31.3 G/DL (ref 28.4–34.8)
MCV RBC AUTO: 93.8 FL (ref 82.6–102.9)
MONOCYTES # BLD: 10 % (ref 3–12)
NRBC AUTOMATED: 0 PER 100 WBC
PDW BLD-RTO: 13.6 % (ref 11.8–14.4)
PLATELET # BLD: 124 K/UL (ref 138–453)
PLATELET ESTIMATE: ABNORMAL
PMV BLD AUTO: 10.1 FL (ref 8.1–13.5)
POTASSIUM SERPL-SCNC: 4 MMOL/L (ref 3.7–5.3)
RBC # BLD: 4.83 M/UL (ref 4.21–5.77)
RBC # BLD: ABNORMAL 10*6/UL
SEG NEUTROPHILS: 67 % (ref 36–65)
SEGMENTED NEUTROPHILS ABSOLUTE COUNT: 4.4 K/UL (ref 1.5–8.1)
SODIUM BLD-SCNC: 140 MMOL/L (ref 135–144)
TOTAL PROTEIN: 6.3 G/DL (ref 6.4–8.3)
TRIGL SERPL-MCNC: 129 MG/DL
VALPROIC ACID LEVEL: 91 UG/ML (ref 50–125)
VALPROIC DATE LAST DOSE: NORMAL
VALPROIC DOSE AMOUNT: NORMAL
VALPROIC TIME LAST DOSE: NORMAL
VLDLC SERPL CALC-MCNC: NORMAL MG/DL (ref 1–30)
WBC # BLD: 6.6 K/UL (ref 3.5–11.3)
WBC # BLD: ABNORMAL 10*3/UL

## 2021-03-12 PROCEDURE — 80061 LIPID PANEL: CPT

## 2021-03-12 PROCEDURE — 80183 DRUG SCRN QUANT OXCARBAZEPIN: CPT

## 2021-03-12 PROCEDURE — 36415 COLL VENOUS BLD VENIPUNCTURE: CPT

## 2021-03-12 PROCEDURE — 80053 COMPREHEN METABOLIC PANEL: CPT

## 2021-03-12 PROCEDURE — P9604 ONE-WAY ALLOW PRORATED TRIP: HCPCS

## 2021-03-12 PROCEDURE — 80164 ASSAY DIPROPYLACETIC ACD TOT: CPT

## 2021-03-12 PROCEDURE — 83036 HEMOGLOBIN GLYCOSYLATED A1C: CPT

## 2021-03-12 PROCEDURE — 85025 COMPLETE CBC W/AUTO DIFF WBC: CPT

## 2021-03-15 ENCOUNTER — HOSPITAL ENCOUNTER (OUTPATIENT)
Age: 36
Discharge: HOME | End: 2021-03-15
Payer: MEDICARE

## 2021-03-15 LAB
EKG ATRIAL RATE: 91 BPM
EKG P AXIS: 46 DEGREES
EKG P-R INTERVAL: 210 MS
EKG Q-T INTERVAL: 348 MS
EKG QRS DURATION: 100 MS
EKG QTC CALCULATION (BAZETT): 428 MS
EKG R AXIS: 116 DEGREES
EKG T AXIS: 1 DEGREES
EKG VENTRICULAR RATE: 91 BPM

## 2021-03-15 PROCEDURE — 93010 ELECTROCARDIOGRAM REPORT: CPT | Performed by: INTERNAL MEDICINE

## 2021-03-15 PROCEDURE — 93005 ELECTROCARDIOGRAM TRACING: CPT | Performed by: FAMILY MEDICINE

## 2021-03-16 LAB — OXCARBAZEPINE: 33 UG/ML (ref 3–35)

## 2021-04-15 ENCOUNTER — OFFICE VISIT (OUTPATIENT)
Dept: CARDIOLOGY | Age: 36
End: 2021-04-15
Payer: MEDICARE

## 2021-04-15 ENCOUNTER — HOSPITAL ENCOUNTER (OUTPATIENT)
Age: 36
Setting detail: SPECIMEN
Discharge: HOME | End: 2021-04-15
Payer: MEDICARE

## 2021-04-15 VITALS
OXYGEN SATURATION: 98 % | HEIGHT: 64 IN | WEIGHT: 219 LBS | SYSTOLIC BLOOD PRESSURE: 126 MMHG | HEART RATE: 80 BPM | RESPIRATION RATE: 18 BRPM | BODY MASS INDEX: 37.39 KG/M2 | DIASTOLIC BLOOD PRESSURE: 72 MMHG

## 2021-04-15 DIAGNOSIS — I35.1 NONRHEUMATIC AORTIC VALVE INSUFFICIENCY: ICD-10-CM

## 2021-04-15 DIAGNOSIS — I35.9 AORTIC VALVE DISORDER: ICD-10-CM

## 2021-04-15 DIAGNOSIS — R00.0 SINUS TACHYCARDIA: ICD-10-CM

## 2021-04-15 DIAGNOSIS — I10 ESSENTIAL HYPERTENSION: ICD-10-CM

## 2021-04-15 DIAGNOSIS — Q24.9 CONGENITAL HEART DISEASE: Primary | ICD-10-CM

## 2021-04-15 LAB
HEPATITIS C ANTIBODY: NONREACTIVE
HIV AG/AB: NONREACTIVE

## 2021-04-15 PROCEDURE — 87389 HIV-1 AG W/HIV-1&-2 AB AG IA: CPT

## 2021-04-15 PROCEDURE — 99211 OFF/OP EST MAY X REQ PHY/QHP: CPT | Performed by: INTERNAL MEDICINE

## 2021-04-15 PROCEDURE — 1036F TOBACCO NON-USER: CPT | Performed by: INTERNAL MEDICINE

## 2021-04-15 PROCEDURE — G8428 CUR MEDS NOT DOCUMENT: HCPCS | Performed by: INTERNAL MEDICINE

## 2021-04-15 PROCEDURE — 36415 COLL VENOUS BLD VENIPUNCTURE: CPT

## 2021-04-15 PROCEDURE — 86803 HEPATITIS C AB TEST: CPT

## 2021-04-15 PROCEDURE — P9604 ONE-WAY ALLOW PRORATED TRIP: HCPCS

## 2021-04-15 PROCEDURE — G8417 CALC BMI ABV UP PARAM F/U: HCPCS | Performed by: INTERNAL MEDICINE

## 2021-04-15 PROCEDURE — 99213 OFFICE O/P EST LOW 20 MIN: CPT | Performed by: INTERNAL MEDICINE

## 2021-04-15 RX ORDER — LACTULOSE 10 G/15ML
SOLUTION ORAL; RECTAL
COMMUNITY
Start: 2021-04-15

## 2021-04-15 RX ORDER — VALPROIC ACID 250 MG/5ML
SOLUTION ORAL
COMMUNITY
Start: 2021-04-09

## 2021-04-15 NOTE — PROGRESS NOTES
Rebecca Collins am scribing for and in the presence of Bi Tello MD, F.A.C.C. Patient: Rebeca Rose  : 1985  Date of Visit: April 15, 2021    REASON FOR VISIT / CONSULTATION: Follow-up (Hx: aortic valve disorder, congenital heart disease, tachy, htn. Pt is here for 6 month f/u. pt is doing okay. he says he does have CP just sometimes. some dizziness Denies: SOB, palps)      History of Present Illness:        Dear [address], MD    I had the pleasure of seeing your patient Rebeca Rose for his yearly follow-up. As you know, Mr. Ruben Mckay is a 37 y.o. male with profound cognitive impairment and congenital aortic valve disease is with his most recent echo showing aortic regurgitation and aortic stenosis. Also his CTA on 2009 showed no significant aortic coarctation with anomalous left subclavian artery. He is here today for follow-up. As per home health aide, there is no new symptoms. He is fairly active in the facility without significant exertional symptoms. Unfortunately, patient cannot give any meaningful history. Other medical problems include bipolar disorder, hypertension, explosive personality disorder and seizure. Echo done on 2019: EF > 65%, The left ventricular cavity size is within normal limits and the left ventricular wall thickness is mildly increased. No definite specific wall motion abnormalities were identified. The mean aortic valve gradient is 20 mmHg consistent with moderate aortic stenosis. Moderate aortic insufficiency was seen. Evidence of moderate (grade II) diastolic dysfunction is seen. EKG done on 3/9/2020: Sinus rhythm with 1st degree A-V block    EKG done on 2020: sinus tachycardia, biatrial enlargement    Echo done on 2020: EF 60%, The left ventricular cavity size is within normal limits and the left ventricular wall thickness is moderately increased.  No definite specific wall motion abnormalities were identified. The mean aortic valve gradient is 20 mmHg consistent with moderate aortic stenosis. Moderate aortic insufficiency was seen. Evidence of mild (grade I) diastolic dysfunction is seen. I reviewed his EKG and it showed sinus rhythm with no change from prior. Mr. Gera Godfrey is here today for a follow up. He is doing well today. He has had no falls or near falls. He has lost 30 pounds since his last visit. Again history is markedly limited by the patient profound mental delay. He denies any chest pain, pressure or tightness. He denies any shortness of breath. He denies any palpitations. He has no dizziness or falling episodes. PAST MEDICAL HISTORY:        Past Medical History:   Diagnosis Date    Aortic valve defect birth   Madhuri Olvera Aortic valve stenosis     (unknown date of diagnosis, in med record from [place] [address])    Bipolar disorder [facility])     (unknown date of diagnosis, in med record from [place] [address])    Chronic mental illness     Coarctation of aorta     (unknown date of diagnosis, in med record from [place] [address])    Depression     (unknown date of diagnosis, in med record from [place] [address])   [facility] personality disorder ([facility] [address]     (unknown date of diagnosis, in med record from [place] [address])   Jerel Yung Tan 1615 of both feet 01/18/2018    hospitalized at [facility]. V's - required debridement of both feet    History of seizure disorder     \"had seizures when younger d/t abilify\" (med record from [facility])    Hypertension     (unknown date of diagnosis, in med record from [place] [address])       CURRENT ALLERGIES: Abilify [aripiprazole], Codeine, and Lithium REVIEW OF SYSTEMS: Review of system is limited by the patient developmental delay.      Past Surgical History:   Procedure Laterality Date    CARDIAC SURGERY  1987    AT BIRTH    LUNG SURGERY  09/2014    lung mass removal (med record from [facility])    PILONIDAL CYST DRAINAGE  2005    Social History:  Social History     Tobacco Use    Smoking status: Never Smoker    Smokeless tobacco: Never Used   Substance Use Topics    Alcohol use: No    Drug use: No        CURRENT MEDICATIONS:        Outpatient Medications Marked as Taking for the 4/15/21 encounter (Office Visit) with Gayathri Lopez MD   Medication Sig Dispense Refill    metoprolol tartrate (LOPRESSOR) 25 MG tablet Take 1 tablet by mouth 2 times daily 180 tablet 3    mineral oil-hydrophilic petrolatum (AQUAPHOR) ointment Apply topically 2 times daily Apply topically to bilateral feet      QUEtiapine (SEROQUEL) 300 MG tablet Take 300 mg by mouth nightly Along with a 50 mg tablet to equal 350 mg      promethazine (PHENERGAN) 25 MG suppository Place 25 mg rectally every 6 hours as needed for Nausea      acetaminophen (TYLENOL) 650 MG suppository Place 650 mg rectally every 4 hours as needed for Fever      ibuprofen (ADVIL;MOTRIN) 600 MG tablet Take 600 mg by mouth every 6 hours as needed for Fever      LORazepam (ATIVAN) 1 MG tablet Take 1 mg by mouth.  docusate sodium (COLACE) 100 MG capsule Take 1 capsule by mouth 2 times daily as needed for Constipation 20 capsule 0    OXcarbazepine (TRILEPTAL) 600 MG tablet Take 600 mg by mouth 3 times daily      QUEtiapine (SEROQUEL) 200 MG tablet Take 500 mg by mouth Daily with lunch       acetaminophen (TYLENOL) 325 MG tablet Take 650 mg by mouth every 4 hours as needed for Pain or Fever      loratadine (CLARITIN) 10 MG tablet Take 10 mg by mouth daily as needed      busPIRone (BUSPAR) 30 MG tablet take 1 tablet by mouth twice a day  0    enalapril (VASOTEC) 10 MG tablet Take 10 mg by mouth daily          FAMILY HISTORY: family history includes Diabetes in his father; Emphysema in his mother; High Blood Pressure in his father; Other in his father, mother, and sister.      Physical Examination:      /72 (Site: Right asymptomatic. · Beta Blocker: Continue Metoprolol tartrate (Lopressor) 25 mg bid. · ACE Inibitor/ARB: Continue enalapril (Vasotec) 10 mg daily   · I will get a repeat echocardiogram in 6 months before his next visit to reassess his heart valves. · History of Abnormal ECG:  ·  ECG done on 3/15/2021 is essentially unchanged from prior. · Continue current therapy and follow up    · Essential Hypertension: Controlled  · Beta Blocker: Continue Metoprolol tartrate (Lopressor) 25 mg bid. · ACE Inibitor/ARB: Continue enalapril (Vasotec) 10 mg daily   · Diuretics: Not indicated at this time.  Sinus tachycardia: EKG done 3/15/2021 showed sinus rhythm heart rate of 91 bpm  · Beta Blocker: Continue Metoprolol tartrate (Lopressor) 25 mg bid. In the meantime I asked him to continue taking his other medications and follow up with you as previously scheduled. FOLLOW UP:   I told Mr. Heidi parekh to call my office if he had any problems, but otherwise told him to Return in about 6 months (around 10/15/2021). However, I would be happy to see him sooner should the need arise. Once again, thank you for allowing me to participate in this patients care. Please do not hesitate to contact me could I be of further assistance. Sincerely,  Ava Price MD, F.A.C.C. [facility] Cardiology Specialist    [address] [place] [address]  Phone: [phone], Fax: [phone]     I believe that the risk of significant morbidity and mortality related to the patient's current medical conditions are: Intermediate. >15 minutes were spent during prep work, discussion and exam of the patient, and follow up documentation and all of their questions were answered. The documentation recorded by the scribe, accurately and completely reflects the services I personally performed and the decisions made by me. Ava Price MD, F.A.C.C.  April 15, 2021

## 2021-04-15 NOTE — PATIENT INSTRUCTIONS
SURVEY:    You may be receiving a survey from [employer] regarding your visit today. Please complete the survey to enable us to provide the highest quality of care to you and your family. If you cannot score us a very good on any question, please call the office to discuss how we could have made your experience a very good one. Thank you.

## 2021-06-22 ENCOUNTER — HOSPITAL ENCOUNTER (OUTPATIENT)
Age: 36
Setting detail: SPECIMEN
Discharge: HOME | End: 2021-06-22
Payer: MEDICARE

## 2021-06-22 LAB
ALT SERPL-CCNC: 17 U/L (ref 5–41)
AST SERPL-CCNC: 17 U/L

## 2021-06-22 PROCEDURE — 36415 COLL VENOUS BLD VENIPUNCTURE: CPT

## 2021-06-22 PROCEDURE — 84450 TRANSFERASE (AST) (SGOT): CPT

## 2021-06-22 PROCEDURE — 84460 ALANINE AMINO (ALT) (SGPT): CPT

## 2021-06-22 PROCEDURE — P9604 ONE-WAY ALLOW PRORATED TRIP: HCPCS

## 2021-07-30 ENCOUNTER — HOSPITAL ENCOUNTER (OUTPATIENT)
Dept: GENERAL RADIOLOGY | Age: 36
Discharge: HOME | End: 2021-08-01
Payer: MEDICARE

## 2021-07-30 ENCOUNTER — HOSPITAL ENCOUNTER (OUTPATIENT)
Age: 36
Discharge: HOME | End: 2021-07-30
Payer: MEDICARE

## 2021-07-30 DIAGNOSIS — M25.571 RIGHT ANKLE PAIN, UNSPECIFIED CHRONICITY: ICD-10-CM

## 2021-07-30 PROCEDURE — 73610 X-RAY EXAM OF ANKLE: CPT

## 2021-08-07 ENCOUNTER — HOSPITAL ENCOUNTER (OUTPATIENT)
Age: 36
Discharge: HOME | End: 2021-08-09
Payer: MEDICARE

## 2021-08-07 ENCOUNTER — HOSPITAL ENCOUNTER (OUTPATIENT)
Dept: GENERAL RADIOLOGY | Age: 36
Discharge: HOME | End: 2021-08-09
Payer: MEDICARE

## 2021-08-07 DIAGNOSIS — M79.641 RIGHT HAND PAIN: ICD-10-CM

## 2021-08-07 PROCEDURE — 73130 X-RAY EXAM OF HAND: CPT

## 2021-09-03 ENCOUNTER — HOSPITAL ENCOUNTER (OUTPATIENT)
Age: 36
Setting detail: SPECIMEN
Discharge: HOME | End: 2021-09-03
Payer: MEDICARE

## 2021-09-03 LAB
ABSOLUTE EOS #: 0.11 K/UL (ref 0–0.44)
ABSOLUTE IMMATURE GRANULOCYTE: 0.06 K/UL (ref 0–0.3)
ABSOLUTE LYMPH #: 2.09 K/UL (ref 1.1–3.7)
ABSOLUTE MONO #: 0.71 K/UL (ref 0.1–1.2)
ALBUMIN SERPL-MCNC: 4.6 G/DL (ref 3.5–5.2)
ALBUMIN/GLOBULIN RATIO: 2.3 (ref 1–2.5)
ALP BLD-CCNC: 61 U/L (ref 40–129)
ALT SERPL-CCNC: 20 U/L (ref 5–41)
ANION GAP SERPL CALCULATED.3IONS-SCNC: 13 MMOL/L (ref 9–17)
AST SERPL-CCNC: 19 U/L
BASOPHILS # BLD: 1 % (ref 0–2)
BASOPHILS ABSOLUTE: 0.06 K/UL (ref 0–0.2)
BILIRUB SERPL-MCNC: 0.38 MG/DL (ref 0.3–1.2)
BUN BLDV-MCNC: 12 MG/DL (ref 6–20)
BUN/CREAT BLD: 17 (ref 9–20)
CALCIUM SERPL-MCNC: 9.5 MG/DL (ref 8.6–10.4)
CHLORIDE BLD-SCNC: 105 MMOL/L (ref 98–107)
CO2: 24 MMOL/L (ref 20–31)
CREAT SERPL-MCNC: 0.72 MG/DL (ref 0.7–1.2)
DIFFERENTIAL TYPE: ABNORMAL
EOSINOPHILS RELATIVE PERCENT: 2 % (ref 1–4)
GFR AFRICAN AMERICAN: >60 ML/MIN
GFR NON-AFRICAN AMERICAN: >60 ML/MIN
GFR SERPL CREATININE-BSD FRML MDRD: NORMAL ML/MIN/{1.73_M2}
GFR SERPL CREATININE-BSD FRML MDRD: NORMAL ML/MIN/{1.73_M2}
GLUCOSE BLD-MCNC: 81 MG/DL (ref 70–99)
HCT VFR BLD CALC: 48.5 % (ref 40.7–50.3)
HEMOGLOBIN: 15.5 G/DL (ref 13–17)
IMMATURE GRANULOCYTES: 1 %
LYMPHOCYTES # BLD: 33 % (ref 24–43)
MCH RBC QN AUTO: 29.1 PG (ref 25.2–33.5)
MCHC RBC AUTO-ENTMCNC: 32 G/DL (ref 28.4–34.8)
MCV RBC AUTO: 91 FL (ref 82.6–102.9)
MONOCYTES # BLD: 11 % (ref 3–12)
NRBC AUTOMATED: 0 PER 100 WBC
PDW BLD-RTO: 13.8 % (ref 11.8–14.4)
PLATELET # BLD: 146 K/UL (ref 138–453)
PLATELET ESTIMATE: ABNORMAL
PMV BLD AUTO: 10.2 FL (ref 8.1–13.5)
POTASSIUM SERPL-SCNC: 4.3 MMOL/L (ref 3.7–5.3)
RBC # BLD: 5.33 M/UL (ref 4.21–5.77)
RBC # BLD: ABNORMAL 10*6/UL
SEG NEUTROPHILS: 52 % (ref 36–65)
SEGMENTED NEUTROPHILS ABSOLUTE COUNT: 3.41 K/UL (ref 1.5–8.1)
SODIUM BLD-SCNC: 142 MMOL/L (ref 135–144)
TOTAL PROTEIN: 6.6 G/DL (ref 6.4–8.3)
VALPROIC ACID LEVEL: 79 UG/ML (ref 50–125)
VALPROIC DATE LAST DOSE: NORMAL
VALPROIC DOSE AMOUNT: NORMAL
VALPROIC TIME LAST DOSE: NORMAL
WBC # BLD: 6.4 K/UL (ref 3.5–11.3)
WBC # BLD: ABNORMAL 10*3/UL

## 2021-09-03 PROCEDURE — 80053 COMPREHEN METABOLIC PANEL: CPT

## 2021-09-03 PROCEDURE — 83036 HEMOGLOBIN GLYCOSYLATED A1C: CPT

## 2021-09-03 PROCEDURE — 80183 DRUG SCRN QUANT OXCARBAZEPIN: CPT

## 2021-09-03 PROCEDURE — 80164 ASSAY DIPROPYLACETIC ACD TOT: CPT

## 2021-09-03 PROCEDURE — 36415 COLL VENOUS BLD VENIPUNCTURE: CPT

## 2021-09-03 PROCEDURE — 85025 COMPLETE CBC W/AUTO DIFF WBC: CPT

## 2021-09-04 LAB
ESTIMATED AVERAGE GLUCOSE: 97 MG/DL
HBA1C MFR BLD: 5 % (ref 4–6)

## 2021-09-06 LAB — OXCARBAZEPINE: 25 UG/ML (ref 3–35)

## 2021-09-20 ENCOUNTER — HOSPITAL ENCOUNTER (OUTPATIENT)
Age: 36
Discharge: HOME | End: 2021-09-20
Payer: MEDICARE

## 2021-09-21 ENCOUNTER — HOSPITAL ENCOUNTER (OUTPATIENT)
Age: 36
Discharge: HOME | End: 2021-09-21
Payer: MEDICARE

## 2021-09-21 LAB
EKG ATRIAL RATE: 89 BPM
EKG P AXIS: 48 DEGREES
EKG P-R INTERVAL: 208 MS
EKG Q-T INTERVAL: 352 MS
EKG QRS DURATION: 100 MS
EKG QTC CALCULATION (BAZETT): 428 MS
EKG R AXIS: 9 DEGREES
EKG T AXIS: -5 DEGREES
EKG VENTRICULAR RATE: 89 BPM

## 2021-09-21 PROCEDURE — 93010 ELECTROCARDIOGRAM REPORT: CPT | Performed by: INTERNAL MEDICINE

## 2021-09-21 PROCEDURE — 93005 ELECTROCARDIOGRAM TRACING: CPT | Performed by: FAMILY MEDICINE

## 2021-10-05 ENCOUNTER — HOSPITAL ENCOUNTER (OUTPATIENT)
Dept: NON INVASIVE DIAGNOSTICS | Age: 36
Discharge: HOME | End: 2021-10-05
Payer: MEDICARE

## 2021-10-05 DIAGNOSIS — I35.1 NONRHEUMATIC AORTIC VALVE INSUFFICIENCY: ICD-10-CM

## 2021-10-05 DIAGNOSIS — R00.0 SINUS TACHYCARDIA: ICD-10-CM

## 2021-10-05 DIAGNOSIS — I10 ESSENTIAL HYPERTENSION: ICD-10-CM

## 2021-10-05 DIAGNOSIS — Q24.9 CONGENITAL HEART DISEASE: ICD-10-CM

## 2021-10-05 DIAGNOSIS — I35.9 AORTIC VALVE DISORDER: ICD-10-CM

## 2021-10-05 LAB
LV EF: 60 %
LVEF MODALITY: NORMAL

## 2021-10-05 PROCEDURE — 93306 TTE W/DOPPLER COMPLETE: CPT

## 2021-10-06 ENCOUNTER — TELEPHONE (OUTPATIENT)
Dept: CARDIOLOGY | Age: 36
End: 2021-10-06

## 2021-10-06 NOTE — TELEPHONE ENCOUNTER
----- Message from Shannon Mosley MD sent at 10/5/2021  4:46 PM EDT -----  Test result abnormal, please have patient make an appointment for follow up.

## 2021-11-17 ENCOUNTER — OFFICE VISIT (OUTPATIENT)
Dept: CARDIOLOGY | Age: 36
End: 2021-11-17
Payer: MEDICARE

## 2021-11-17 VITALS
RESPIRATION RATE: 18 BRPM | BODY MASS INDEX: 36.09 KG/M2 | DIASTOLIC BLOOD PRESSURE: 67 MMHG | HEART RATE: 82 BPM | OXYGEN SATURATION: 97 % | SYSTOLIC BLOOD PRESSURE: 111 MMHG | WEIGHT: 211.4 LBS | HEIGHT: 64 IN

## 2021-11-17 DIAGNOSIS — I35.9 AORTIC VALVE DISORDER: ICD-10-CM

## 2021-11-17 DIAGNOSIS — R00.0 SINUS TACHYCARDIA: ICD-10-CM

## 2021-11-17 DIAGNOSIS — I10 ESSENTIAL HYPERTENSION: ICD-10-CM

## 2021-11-17 DIAGNOSIS — Q25.1 COARCTATION OF AORTA: ICD-10-CM

## 2021-11-17 DIAGNOSIS — I35.0 NONRHEUMATIC AORTIC VALVE STENOSIS: Primary | ICD-10-CM

## 2021-11-17 DIAGNOSIS — Q24.9 CONGENITAL HEART DISEASE: Primary | ICD-10-CM

## 2021-11-17 DIAGNOSIS — I10 PRIMARY HYPERTENSION: ICD-10-CM

## 2021-11-17 PROCEDURE — G8484 FLU IMMUNIZE NO ADMIN: HCPCS | Performed by: INTERNAL MEDICINE

## 2021-11-17 PROCEDURE — G8417 CALC BMI ABV UP PARAM F/U: HCPCS | Performed by: INTERNAL MEDICINE

## 2021-11-17 PROCEDURE — 1036F TOBACCO NON-USER: CPT | Performed by: INTERNAL MEDICINE

## 2021-11-17 PROCEDURE — G8427 DOCREV CUR MEDS BY ELIG CLIN: HCPCS | Performed by: INTERNAL MEDICINE

## 2021-11-17 PROCEDURE — 99211 OFF/OP EST MAY X REQ PHY/QHP: CPT | Performed by: INTERNAL MEDICINE

## 2021-11-17 PROCEDURE — 99213 OFFICE O/P EST LOW 20 MIN: CPT | Performed by: INTERNAL MEDICINE

## 2021-11-17 RX ORDER — DIAZEPAM 5 MG/1
TABLET ORAL
COMMUNITY
Start: 2021-09-22

## 2021-11-17 NOTE — PROGRESS NOTES
Edward Roque am scribing for and in the presence of Colleen Medrano MD, F.A.C.C. Patient: Carter Trotter  : 1985  Date of Visit: 2021    REASON FOR VISIT / CONSULTATION: 6 Month Follow-Up (HX:congential aortic valve disorder, abn EKG, HTN, HTN, sinus tachy Pt is here with aide from [facility] for 6 month follow up he states he is doing well denies:CP,SOB,llightheaded/dizziness,palp )      History of Present Illness:        Dear Jessica Nielson MD    I had the pleasure of seeing your patient Carter Trotter for his yearly follow-up. As you know, Mr. Domenica Alonzo is a 31 y.o. male with profound cognitive impairment and congenital aortic valve disease is with his most recent echo showing aortic regurgitation and aortic stenosis. Also his CTA on 2009 showed no significant aortic coarctation with anomalous left subclavian artery. He is here today for follow-up. As per home health aide, there is no new symptoms. He is fairly active in the facility without significant exertional symptoms. Unfortunately, patient cannot give any meaningful history. Other medical problems include bipolar disorder, hypertension, explosive personality disorder and seizure. Echo done on 2019: EF > 65%, The left ventricular cavity size is within normal limits and the left ventricular wall thickness is mildly increased. No definite specific wall motion abnormalities were identified. The mean aortic valve gradient is 20 mmHg consistent with moderate aortic stenosis. Moderate aortic insufficiency was seen. Evidence of moderate (grade II) diastolic dysfunction is seen. EKG done on 3/9/2020: Sinus rhythm with 1st degree A-V block    EKG done on 2020: sinus tachycardia, biatrial enlargement    Echo done on 2020: EF 60%, The left ventricular cavity size is within normal limits and the left ventricular wall thickness is moderately increased.  No definite specific wall motion abnormalities were identified. The mean aortic valve gradient is 20 mmHg consistent with moderate aortic stenosis. Moderate aortic insufficiency was seen. Evidence of mild (grade I) diastolic dysfunction is seen. I reviewed his EKG 9/2021 and it showed sinus rhythm with no change from prior. Mr. Cristian Porter is here today for a six month follow up. He is doing well today. He has had no falls or near falls. Again history is markedly limited by the patient profound mental delay. His aide with him denies any problems. He denies any chest pain, pressure or tightness. He denies any shortness of breath. He denies any palpitations. He has no dizziness or falling episodes. Echo done on 10/5/2021- EF 60%  The left ventricular cavity size is mildly enlarged and the left ventricular wall thickness is mildly increased. No definite specific wall motion abnormalities were identified. The left atrium is moderately dilated (34-39) with a left atrial volume index of 35 ml/m2. Aortic leaflet calcification with mild aortic stenosis with a mean gradient  of 19 mmHg. Moderate to severe aortic regurgitation was seen. Evidence of mild diastolic dysfunction is seen. Giving dilated left ventricle and aortic valve pathology as above, please consider SERAFIN for better evaluation of the aortic valve if clinically   Indicated. Clinical examination is very much unremarkable except for the heart murmur. He is doing really good since last visit. No exertional shortness of breath. No signs of volume overload. I will hold off SERAFIN for now, I will get repeat echo in 3 months and I plan to see him in 6-month unless if the echo came back abnormal then I will proceed with SERAFIN. The evaluation of aortic regurgitation subjective and I reviewed the images, his left ventricle is not dilated.     PAST MEDICAL HISTORY:        Past Medical History:   Diagnosis Date    Aortic valve defect birth    Aortic valve stenosis     (unknown date of diagnosis, in med record from [facility])    Bipolar disorder [facility])     (unknown date of diagnosis, in med record from [facility])    Chronic mental illness     Coarctation of aorta     (unknown date of diagnosis, in med record from [facility])    Depression     (unknown date of diagnosis, in med record from [facility])   [facility] personality disorder ([address]     (unknown date of diagnosis, in med record from [facility])   Jerel Yung Tan 1615 of both feet 01/18/2018    hospitalized at [facility]. V's - required debridement of both feet    History of seizure disorder     \"had seizures when younger d/t abilify\" (med record from [facility])    Hypertension     (unknown date of diagnosis, in med record from [facility])       CURRENT ALLERGIES: Abilify [aripiprazole], Codeine, and Lithium REVIEW OF SYSTEMS: Review of system is limited by the patient developmental delay.      Past Surgical History:   Procedure Laterality Date    CARDIAC SURGERY  1985    AT BIRTH    LUNG SURGERY  09/2014    lung mass removal (med record from [facility])    PILONIDAL CYST DRAINAGE  2005    Social History:  Social History     Tobacco Use    Smoking status: Never Smoker    Smokeless tobacco: Never Used   Substance Use Topics    Alcohol use: No    Drug use: No        CURRENT MEDICATIONS:        Outpatient Medications Marked as Taking for the 11/17/21 encounter (Office Visit) with Severiano Stable, MD   Medication Sig Dispense Refill    diazePAM (VALIUM) 5 MG tablet       valproic acid (DEPACON) 250 MG/5ML SOLN oral solution       ENULOSE 10 GM/15ML SOLN solution       metoprolol tartrate (LOPRESSOR) 25 MG tablet Take 1 tablet by mouth 2 times daily 180 tablet 3    mineral oil-hydrophilic petrolatum (AQUAPHOR) ointment Apply topically 2 times daily Apply topically to bilateral feet      divalproex (DEPAKOTE) 125 MG DR tablet Take 375 mg by mouth every morning Pt is to take 125mg and a 250mg tablet every morning      QUEtiapine (SEROQUEL) 300 MG tablet Take 300 mg by mouth nightly Along with a 50 mg tablet to equal 350 mg      promethazine (PHENERGAN) 25 MG suppository Place 25 mg rectally every 6 hours as needed for Nausea      acetaminophen (TYLENOL) 650 MG suppository Place 650 mg rectally every 4 hours as needed for Fever      ibuprofen (ADVIL;MOTRIN) 600 MG tablet Take 600 mg by mouth every 6 hours as needed for Fever      LORazepam (ATIVAN) 1 MG tablet Take 1 mg by mouth.  docusate sodium (COLACE) 100 MG capsule Take 1 capsule by mouth 2 times daily as needed for Constipation 20 capsule 0    divalproex (DEPAKOTE) 250 MG DR tablet Take 250 mg by mouth daily (with breakfast)      divalproex (DEPAKOTE) 250 MG DR tablet Take 1,250 mg by mouth nightly      OXcarbazepine (TRILEPTAL) 600 MG tablet Take 600 mg by mouth 3 times daily      QUEtiapine (SEROQUEL) 200 MG tablet Take 500 mg by mouth Daily with lunch       acetaminophen (TYLENOL) 325 MG tablet Take 650 mg by mouth every 4 hours as needed for Pain or Fever      loratadine (CLARITIN) 10 MG tablet Take 10 mg by mouth daily as needed      busPIRone (BUSPAR) 30 MG tablet take 1 tablet by mouth twice a day  0    enalapril (VASOTEC) 10 MG tablet Take 10 mg by mouth daily          FAMILY HISTORY: family history includes Diabetes in his father; Emphysema in his mother; High Blood Pressure in his father; Other in his father, mother, and sister. Physical Examination:      /67 (Site: Right Upper Arm, Position: Sitting, Cuff Size: Large Adult)   Pulse 82   Resp 18   Ht 5' 4\" (1.626 m)   Wt 211 lb 6.4 oz (95.9 kg)   SpO2 97%   BMI 36.29 kg/m²  Body mass index is 36.29 kg/m². Constitutional: Looks comfortable. No acute distress. Debbie ROGEL: Normocephalic and atraumatic. No JVD present. Carotid bruit is not present.  No mass and no thyromegaly present. No lymphadenopathy present. Cardiovascular: Normal rate, regular rhythm, normal heart sounds. Exam reveals no gallop and no friction rubs. 2/6 systolic murmur, 2nd intercostal space on the LEFT just lateral to the sternum. Pulmonary/Chest: Effort normal and breath sounds normal. No respiratory distress. He has no wheezes, rhonchi or rales. Abdominal: Soft, non-tender. Bowel sounds and aorta are normal. He exhibits no organomegaly, mass or bruit. Extremities: No edema. No cyanosis or clubbing. 2+ radial and carotid pulses. Distal extremity pulses: 2+ bilaterally. .  Neurological: Patient has significant developmental delay but able to answer yes/no questions. Skin: Skin is warm and dry. There is no rash or diaphoresis. Psychiatric: He is pleasant today. MOST RECENT LABS ON RECORD:   Lab Results   Component Value Date    WBC 6.4 09/03/2021    HGB 15.5 09/03/2021    HCT 48.5 09/03/2021     09/03/2021    CHOL 149 03/12/2021    TRIG 129 03/12/2021    HDL 42 03/12/2021    ALT 20 09/03/2021    AST 19 09/03/2021     09/03/2021    K 4.3 09/03/2021     09/03/2021    CREATININE 0.72 09/03/2021    BUN 12 09/03/2021    CO2 24 09/03/2021    TSH 1.785 11/13/2012    LABA1C 5.0 09/03/2021         ASSESSMENT:     1. Congenital heart disease    2. Aortic valve disorder    3. Essential hypertension    4. Sinus tachycardia       PLAN:      · Congenital aortic valve disease: asymptomatic   · Moderate aortic regurgitation and moderate aortic stenosis VIA on Echo 9/28/20  · Anomalous origin of left subclavian artery. · Currently asymptomatic. · I reviewed the images from his most recent echo on 10/5/2021, I did not appreciate any change from prior echo. His aortic regurgitation still moderate to severe. His aortic stenosis is moderate. His left ventricular dimensions are normal when you take the average from the from beats.   I do not think SERAFIN is indicated at this point because he is asymptomatic and has no signs of volume overload. · I told his aide that we will get a repeat echo in 3 months and based on this we will decide regarding SERAFIN. He is young and proceeding with valve intervention meaning replacement at this point carries more risk than watchful waiting. I plan to see him in the office in 6 months but if his echo came back abnormal I will proceed with a SERAFIN. · Beta Blocker: Continue Metoprolol tartrate (Lopressor) 25 mg bid. · ACE Inibitor/ARB: Continue enalapril (Vasotec) 10 mg daily       · History of Abnormal ECG:  ·  ECG done on 9/21/2021 is essentially unchanged from prior. · Continue current therapy and follow up    · Essential Hypertension: Controlled  · Beta Blocker: Continue Metoprolol tartrate (Lopressor) 25 mg bid. · ACE Inibitor/ARB: Continue enalapril (Vasotec) 10 mg daily   · Diuretics: Not indicated at this time.  Sinus tachycardia: EKG done 3/15/2021 showed sinus rhythm heart rate of 91 bpm  · Beta Blocker: Continue Metoprolol tartrate (Lopressor) 25 mg bid. In the meantime I asked him to continue taking his other medications and follow up with you as previously scheduled. FOLLOW UP:   I told Mr. Ervin parekh to call my office if he had any problems, but otherwise told him to Return in about 6 months (around 5/17/2022). However, I would be happy to see him sooner should the need arise. Once again, thank you for allowing me to participate in this patients care. Please do not hesitate to contact me could I be of further assistance. Sincerely,  Zeynep Woody MD, F.A.C.C. [facility] Cardiology Specialist     Place [facility], [address] [place] [address]  Phone: [phone], Fax: [phone]     I believe that the risk of significant morbidity and mortality related to the patient's current medical conditions are: Intermediate.  >15 minutes were spent during prep work, discussion and exam of the patient, and follow up documentation and all of their questions were answered. The documentation recorded by the scribe, accurately and completely reflects the services I personally performed and the decisions made by me. Rochelle Carmen MD, F.A.C.C.  November 17, 2021

## 2021-12-17 ENCOUNTER — HOSPITAL ENCOUNTER (OUTPATIENT)
Age: 36
Setting detail: SPECIMEN
Discharge: HOME | End: 2021-12-17
Payer: MEDICARE

## 2021-12-17 LAB
ALT SERPL-CCNC: 13 U/L (ref 5–41)
AST SERPL-CCNC: 14 U/L

## 2021-12-17 PROCEDURE — 36415 COLL VENOUS BLD VENIPUNCTURE: CPT

## 2021-12-17 PROCEDURE — P9603 ONE-WAY ALLOW PRORATED MILES: HCPCS

## 2021-12-17 PROCEDURE — 84460 ALANINE AMINO (ALT) (SGPT): CPT

## 2021-12-17 PROCEDURE — 84450 TRANSFERASE (AST) (SGOT): CPT

## 2021-12-21 ENCOUNTER — HOSPITAL ENCOUNTER (OUTPATIENT)
Dept: GENERAL RADIOLOGY | Age: 36
Discharge: HOME | End: 2021-12-23
Payer: MEDICARE

## 2021-12-21 DIAGNOSIS — R13.19 DYSPHAGIA, NEUROLOGIC: ICD-10-CM

## 2021-12-21 DIAGNOSIS — T17.320A CHOKING DUE TO FOOD IN LARYNX, INITIAL ENCOUNTER: ICD-10-CM

## 2021-12-21 PROCEDURE — A4641 RADIOPHARM DX AGENT NOC: HCPCS | Performed by: FAMILY MEDICINE

## 2021-12-21 PROCEDURE — 6360000004 HC RX CONTRAST MEDICATION: Performed by: FAMILY MEDICINE

## 2021-12-21 PROCEDURE — 92611 MOTION FLUOROSCOPY/SWALLOW: CPT

## 2021-12-21 PROCEDURE — 74230 X-RAY XM SWLNG FUNCJ C+: CPT

## 2021-12-21 RX ADMIN — BARIUM SULFATE 355 ML: 0.6 SUSPENSION ORAL at 14:00

## 2021-12-21 ASSESSMENT — PAIN - FUNCTIONAL ASSESSMENT: PAIN_FUNCTIONAL_ASSESSMENT: 0-10

## 2021-12-21 ASSESSMENT — PAIN SCALES - GENERAL: PAINLEVEL_OUTOF10: 0

## 2021-12-21 NOTE — PROCEDURES
consistency: Thin  Liquid administration via: Spoon;Cup;Straw    Medication administration: PO    Safe Swallow Protocol:  Supervision: Close  Compensatory Swallowing Strategies: Eat/Feed slowly;Upright as possible for all oral intake;Remain upright for 30-45 minutes after meals;Small bites/sips              Recommendations/Treatment  Requires SLP Intervention: Yes           Postural Changes and/or Swallow Maneuvers: Upright 30 min after meal;Upright 90 degrees      Recommended Exercises:    Therapeutic Interventions: Mendelsohn;Patient/Family education; Tongue base strengthening;Diet tolerance monitoring;Pharyngeal exercises;Effortful swallow      Education: Images and recommendations were reviewed with the patient and caregiver following this exam.   Patient Education: ST educated patient and caregiver re: results of MBSS, diet recommendations, and plan of care. Patient Education Response: Verbalizes understanding    Prognosis  Prognosis for safe diet advancement: fair  Barriers to reach goals: cognitive deficits        Oral Preparation / Oral Phase  Oral Phase: Impaired  Oral Phase - Major Contributing Deficits  Reduced Tongue Base Retraction: All    Oral Phase: Patient presents with mild oral phase dysphagia at this time. Patient demonstrated slightly reduced strength and ROM of labial and lingual musculature; however, demonstrated adequate bolus control and no oral residues remaining post-swallow. Pharyngeal Phase  Pharyngeal Phase: Impaired  Pharyngeal Phase - Major Contributing Deficits  Reduced Epiglottic Distention: All  Reduced Laryngeal Elevation: All  Reduced Anterior Laryngeal Movement: All  Reduced Thyrohyoid Approximation:  (WFL)  Shallow Penetration Before: Thin straw  Pharyngeal Residue - Valleculae: All  Pharyngeal Residue - Pyriform: All  Pharyngeal Wall - Weakness: All  Pharyngeal: Patient presents with a mild pharyngeal dysphagia at this time.  Pharyngeal phase limited by reduced hyolaryngeal elevation and excursion (adequate hyothyroid approximation) causing poor epiglottic inversion. Decreased epiglottic inversion and reduced pharyngeal squeeze resulted in mild vallecular and pyriform sinus resideus with most consistencies trialed. Chin tuck was trialed, but was not successful in reduced pharyngeal residues. Patient demonstrated flash penetration with successive sips of thin liquids via straw. No other instances of aspiration/penetration appreciated despite dry cough response elicited x 3 during evaluation. Esophageal Phase  Esophageal Screen: WFL        Pain   Patient Currently in Pain: Denies  Pain Level: 0      Therapy Time:   Individual   Time In 1400   Time Out 1430   Minutes 30         Patient reports to [facility] Radiology department for Modified Barium Swallow Study. Patient is a resident at [facility]. Caregiver reports patient recently had a choking incident on an apple and the Heimlich maneuver needed to be performed. Patient is now on a \"chopped\" diet at [facility]. Caregiver feels that patient's difficulty stems from fast intake of foods. Patient alert, verbal, and able to follow basic directions. Patient presents with mild oral phase dysphagia at this time. Patient demonstrated slightly reduced strength and ROM of labial and lingual musculature; however, demonstrated adequate bolus control and no oral residues remaining post-swallow. Patient presents with a mild pharyngeal dysphagia at this time. Pharyngeal phase limited by reduced hyolaryngeal elevation and excursion (adequate hyothyroid approximation) causing poor epiglottic inversion. Decreased epiglottic inversion, reduced base of tongue retraction, and reduced pharyngeal squeeze resulted in mild vallecular and pyriform sinus residues with most consistencies trialed. Increased residues noted with regular solids. Chin tuck was trialed, but was not successful in reduced pharyngeal residues.  Patient demonstrated flash penetration with successive sips of thin liquids via straw. No other instances of aspiration/penetration appreciated despite dry cough response elicited x 3 during evaluation. Patient appeared to have fast rate of intake when utilizing a straw vs. Cup. Patient may benefit from no straws to ensure safety with swallowing. ST recommends continued diet of chopped solids and thin liquids at this time. Compensatory strategies should include:      -Close supervision during all PO intake with cues for the following:   - Small bites/sips   -Pacing/slow rate (one bite/sip at a time)   -Sit upright during and 30-45 minutes after PO intake   - Alternate solids and liquids   -No straws     Patient may benefit from dysphagia therapy to address pharyngeal weakness and trial tolerance for more regular consistencies.         Mirta Colin M.S. [address] [place] [address]  12/21/2021, 4:18 PM

## 2022-02-23 ENCOUNTER — HOSPITAL ENCOUNTER (OUTPATIENT)
Dept: NON INVASIVE DIAGNOSTICS | Age: 37
Discharge: HOME | End: 2022-02-23
Payer: MEDICARE

## 2022-02-23 LAB
LV EF: 55 %
LVEF MODALITY: NORMAL

## 2022-02-23 PROCEDURE — 93306 TTE W/DOPPLER COMPLETE: CPT

## 2022-02-24 ENCOUNTER — TELEPHONE (OUTPATIENT)
Dept: CARDIOLOGY | Age: 37
End: 2022-02-24

## 2022-02-24 NOTE — TELEPHONE ENCOUNTER
----- Message from Mine Arboleda PA-C sent at 2/24/2022  8:18 AM EST -----  Please notify patient that their echo had no significant change. Aortic stenosis is still mild to moderate. Continue current treatment and follow up.  Thanks

## 2022-03-04 ENCOUNTER — HOSPITAL ENCOUNTER (OUTPATIENT)
Age: 37
Setting detail: SPECIMEN
Discharge: HOME | End: 2022-03-04
Payer: MEDICARE

## 2022-03-04 LAB
ABSOLUTE EOS #: 0.12 K/UL (ref 0–0.44)
ABSOLUTE IMMATURE GRANULOCYTE: 0.07 K/UL (ref 0–0.3)
ABSOLUTE LYMPH #: 2.05 K/UL (ref 1.1–3.7)
ABSOLUTE MONO #: 0.61 K/UL (ref 0.1–1.2)
ALBUMIN SERPL-MCNC: 4.6 G/DL (ref 3.5–5.2)
ALBUMIN/GLOBULIN RATIO: 2.7 (ref 1–2.5)
ALP BLD-CCNC: 72 U/L (ref 40–129)
ALT SERPL-CCNC: 17 U/L (ref 5–41)
AMMONIA: 63 UMOL/L (ref 16–60)
ANION GAP SERPL CALCULATED.3IONS-SCNC: 12 MMOL/L (ref 9–17)
AST SERPL-CCNC: 15 U/L
BASOPHILS # BLD: 1 % (ref 0–2)
BASOPHILS ABSOLUTE: 0.07 K/UL (ref 0–0.2)
BILIRUB SERPL-MCNC: 0.46 MG/DL (ref 0.3–1.2)
BUN BLDV-MCNC: 15 MG/DL (ref 6–20)
BUN/CREAT BLD: 21 (ref 9–20)
CALCIUM SERPL-MCNC: 9.4 MG/DL (ref 8.6–10.4)
CHLORIDE BLD-SCNC: 99 MMOL/L (ref 98–107)
CO2: 28 MMOL/L (ref 20–31)
CREAT SERPL-MCNC: 0.73 MG/DL (ref 0.7–1.2)
EOSINOPHILS RELATIVE PERCENT: 2 % (ref 1–4)
ESTIMATED AVERAGE GLUCOSE: 100 MG/DL
GFR AFRICAN AMERICAN: >60 ML/MIN
GFR NON-AFRICAN AMERICAN: >60 ML/MIN
GFR SERPL CREATININE-BSD FRML MDRD: ABNORMAL ML/MIN/{1.73_M2}
GFR SERPL CREATININE-BSD FRML MDRD: ABNORMAL ML/MIN/{1.73_M2}
GLUCOSE BLD-MCNC: 83 MG/DL (ref 70–99)
HBA1C MFR BLD: 5.1 % (ref 4–6)
HCT VFR BLD CALC: 49.7 % (ref 40.7–50.3)
HEMOGLOBIN: 15.9 G/DL (ref 13–17)
IMMATURE GRANULOCYTES: 1 %
LYMPHOCYTES # BLD: 34 % (ref 24–43)
MCH RBC QN AUTO: 29 PG (ref 25.2–33.5)
MCHC RBC AUTO-ENTMCNC: 32 G/DL (ref 28.4–34.8)
MCV RBC AUTO: 90.7 FL (ref 82.6–102.9)
MONOCYTES # BLD: 10 % (ref 3–12)
NRBC AUTOMATED: 0 PER 100 WBC
PDW BLD-RTO: 13.4 % (ref 11.8–14.4)
PLATELET # BLD: ABNORMAL K/UL (ref 138–453)
PLATELET, FLUORESCENCE: 167 K/UL (ref 138–453)
PLATELET, IMMATURE FRACTION: 2.7 % (ref 1.1–10.3)
POTASSIUM SERPL-SCNC: 4.4 MMOL/L (ref 3.7–5.3)
RBC # BLD: 5.48 M/UL (ref 4.21–5.77)
SEG NEUTROPHILS: 52 % (ref 36–65)
SEGMENTED NEUTROPHILS ABSOLUTE COUNT: 3.14 K/UL (ref 1.5–8.1)
SODIUM BLD-SCNC: 139 MMOL/L (ref 135–144)
TOTAL PROTEIN: 6.3 G/DL (ref 6.4–8.3)
VALPROIC ACID LEVEL: 86 UG/ML (ref 50–125)
WBC # BLD: 6.1 K/UL (ref 3.5–11.3)

## 2022-03-04 PROCEDURE — P9604 ONE-WAY ALLOW PRORATED TRIP: HCPCS

## 2022-03-04 PROCEDURE — 80164 ASSAY DIPROPYLACETIC ACD TOT: CPT

## 2022-03-04 PROCEDURE — 80053 COMPREHEN METABOLIC PANEL: CPT

## 2022-03-04 PROCEDURE — 80061 LIPID PANEL: CPT

## 2022-03-04 PROCEDURE — 83036 HEMOGLOBIN GLYCOSYLATED A1C: CPT

## 2022-03-04 PROCEDURE — 85025 COMPLETE CBC W/AUTO DIFF WBC: CPT

## 2022-03-04 PROCEDURE — 85055 RETICULATED PLATELET ASSAY: CPT

## 2022-03-04 PROCEDURE — 36415 COLL VENOUS BLD VENIPUNCTURE: CPT

## 2022-03-04 PROCEDURE — 80183 DRUG SCRN QUANT OXCARBAZEPIN: CPT

## 2022-03-04 PROCEDURE — 82140 ASSAY OF AMMONIA: CPT

## 2022-03-05 LAB
CHOLESTEROL/HDL RATIO: 3.3
CHOLESTEROL: 160 MG/DL
HDLC SERPL-MCNC: 49 MG/DL
LDL CHOLESTEROL: 80 MG/DL (ref 0–130)
TRIGL SERPL-MCNC: 155 MG/DL

## 2022-03-08 LAB — OXCARBAZEPINE: 29 UG/ML (ref 3–35)

## 2022-03-14 ENCOUNTER — HOSPITAL ENCOUNTER (OUTPATIENT)
Age: 37
Discharge: HOME | End: 2022-03-14
Payer: MEDICARE

## 2022-03-14 LAB
EKG ATRIAL RATE: 79 BPM
EKG P AXIS: 105 DEGREES
EKG P-R INTERVAL: 196 MS
EKG Q-T INTERVAL: 344 MS
EKG QRS DURATION: 98 MS
EKG QTC CALCULATION (BAZETT): 394 MS
EKG R AXIS: 79 DEGREES
EKG T AXIS: -10 DEGREES
EKG VENTRICULAR RATE: 79 BPM

## 2022-03-14 PROCEDURE — 93010 ELECTROCARDIOGRAM REPORT: CPT | Performed by: PEDIATRICS

## 2022-03-14 PROCEDURE — 93005 ELECTROCARDIOGRAM TRACING: CPT | Performed by: FAMILY MEDICINE

## 2022-04-08 ENCOUNTER — HOSPITAL ENCOUNTER (OUTPATIENT)
Age: 37
Setting detail: SPECIMEN
Discharge: HOME | End: 2022-04-08
Payer: MEDICARE

## 2022-04-08 LAB — AMMONIA: 62 UMOL/L (ref 16–60)

## 2022-04-08 PROCEDURE — P9604 ONE-WAY ALLOW PRORATED TRIP: HCPCS

## 2022-04-08 PROCEDURE — 36415 COLL VENOUS BLD VENIPUNCTURE: CPT

## 2022-04-08 PROCEDURE — 82140 ASSAY OF AMMONIA: CPT

## 2022-04-19 ENCOUNTER — HOSPITAL ENCOUNTER (OUTPATIENT)
Age: 37
Discharge: HOME | End: 2022-04-21
Payer: MEDICARE

## 2022-04-19 ENCOUNTER — HOSPITAL ENCOUNTER (OUTPATIENT)
Dept: GENERAL RADIOLOGY | Age: 37
Discharge: HOME | End: 2022-04-21
Payer: MEDICARE

## 2022-04-19 DIAGNOSIS — R60.9 SWELLING: ICD-10-CM

## 2022-04-19 DIAGNOSIS — T14.90XA INJURY: ICD-10-CM

## 2022-04-19 PROCEDURE — 73130 X-RAY EXAM OF HAND: CPT

## 2022-05-17 ENCOUNTER — OFFICE VISIT (OUTPATIENT)
Dept: CARDIOLOGY | Age: 37
End: 2022-05-17
Payer: MEDICARE

## 2022-05-17 VITALS
DIASTOLIC BLOOD PRESSURE: 70 MMHG | SYSTOLIC BLOOD PRESSURE: 122 MMHG | BODY MASS INDEX: 36.98 KG/M2 | RESPIRATION RATE: 18 BRPM | HEART RATE: 79 BPM | WEIGHT: 216.6 LBS | HEIGHT: 64 IN

## 2022-05-17 DIAGNOSIS — R00.0 SINUS TACHYCARDIA: ICD-10-CM

## 2022-05-17 DIAGNOSIS — I10 PRIMARY HYPERTENSION: ICD-10-CM

## 2022-05-17 DIAGNOSIS — I10 ESSENTIAL HYPERTENSION: ICD-10-CM

## 2022-05-17 DIAGNOSIS — Q25.1 COARCTATION OF AORTA: ICD-10-CM

## 2022-05-17 DIAGNOSIS — Q24.9 CONGENITAL HEART DISEASE: ICD-10-CM

## 2022-05-17 DIAGNOSIS — I35.9 AORTIC VALVE DISORDER: ICD-10-CM

## 2022-05-17 DIAGNOSIS — I35.0 NONRHEUMATIC AORTIC VALVE STENOSIS: Primary | ICD-10-CM

## 2022-05-17 PROCEDURE — G8427 DOCREV CUR MEDS BY ELIG CLIN: HCPCS | Performed by: INTERNAL MEDICINE

## 2022-05-17 PROCEDURE — 99213 OFFICE O/P EST LOW 20 MIN: CPT | Performed by: INTERNAL MEDICINE

## 2022-05-17 PROCEDURE — 1036F TOBACCO NON-USER: CPT | Performed by: INTERNAL MEDICINE

## 2022-05-17 PROCEDURE — G8417 CALC BMI ABV UP PARAM F/U: HCPCS | Performed by: INTERNAL MEDICINE

## 2022-05-17 PROCEDURE — 99211 OFF/OP EST MAY X REQ PHY/QHP: CPT | Performed by: INTERNAL MEDICINE

## 2022-05-17 NOTE — PATIENT INSTRUCTIONS
SURVEY:    You may be receiving a survey from [employer] regarding your visit today. Please complete the survey to enable us to provide the highest quality of care to you and your family. If you cannot score us a very good on any question, please call the office to discuss how we could have made your experience a very good one. Thank you.

## 2022-05-17 NOTE — PROGRESS NOTES
Cassie Calle am scribing for and in the presence of Brian Strong MD, F.A.C.C. Patient: Ector Singh  : 1985  Date of Visit: May 17, 2022    REASON FOR VISIT / CONSULTATION: Follow-up (Hx: congential heart disease, Aortic valve dis, HTN,. 6 month f/u. says he has pain in chest, dizziness Denies: SOb, )      History of Present Illness:        Dear Francisco Hernandez MD    I had the pleasure of seeing your patient Ector Singh for his yearly follow-up. As you know, Mr. Braeden Rodriguez is a 31 y.o. male with profound cognitive impairment and congenital aortic valve disease is with his most recent echo showing aortic regurgitation and aortic stenosis. Also his CTA on 2009 showed no significant aortic coarctation with anomalous left subclavian artery. He is here today for follow-up. As per home health aide, there is no new symptoms. He is fairly active in the facility without significant exertional symptoms. Unfortunately, patient cannot give any meaningful history. Other medical problems include bipolar disorder, hypertension, explosive personality disorder and seizure. Echo done on 2019: EF > 65%, The left ventricular cavity size is within normal limits and the left ventricular wall thickness is mildly increased. No definite specific wall motion abnormalities were identified. The mean aortic valve gradient is 20 mmHg consistent with moderate aortic stenosis. Moderate aortic insufficiency was seen. Evidence of moderate (grade II) diastolic dysfunction is seen. EKG done on 3/9/2020: Sinus rhythm with 1st degree A-V block    EKG done on 2020: sinus tachycardia, biatrial enlargement    Echo done on 2020: EF 60%, The left ventricular cavity size is within normal limits and the left ventricular wall thickness is moderately increased. No definite specific wall motion abnormalities were identified.  The mean aortic valve gradient is 20 mmHg consistent with moderate aortic stenosis. Moderate aortic insufficiency was seen. Evidence of mild (grade I) diastolic dysfunction is seen. I reviewed his EKG 9/2021 and it showed sinus rhythm with no change from prior. Echo done on 10/5/2021- EF 60%  The left ventricular cavity size is mildly enlarged and the left ventricular wall thickness is mildly increased. No definite specific wall motion abnormalities were identified. The left atrium is moderately dilated (34-39) with a left atrial volume index of 35 ml/m2. Aortic leaflet calcification with mild aortic stenosis with a mean gradient of 19 mmHg. Moderate to severe aortic regurgitation was seen. Echo done on 2/23/2022- ejection fraction of 55%. The left ventricular cavity size is mildly enlarged and the left ventricular wall thickness is mildly increased. The mean aortic valve gradient is 19 mmHg consistent with mild to moderate aortic stenosis. At least moderate aortic insufficiency was seen. Evidence of mild (grade I) diastolic dysfunction is seen    Mr. David Salazar is here today for a six month follow up. He is doing well today. He has had no falls or near falls. Again history is markedly limited by the patient profound mental delay. His aide with him denies any problems and says he continues to do well. She reports no passing out episodes. He denies any chest pain, pressure or tightness. He denies any shortness of breath. He denies any palpitations. He has no dizziness or falling episodes.       PAST MEDICAL HISTORY:        Past Medical History:   Diagnosis Date    Aortic valve defect birth   [employer] Aortic valve stenosis     (unknown date of diagnosis, in med record from [place] [id number])    Bipolar disorder [facility])     (unknown date of diagnosis, in med record from [place] [address])    Chronic mental illness     Coarctation of aorta     (unknown date of diagnosis, in med record from [place] [id number])    Depression     (unknown date of diagnosis, in [facility] record from [facility] [place]   [facility] personality disorder [facility])     (unknown date of diagnosis, in med record from [facility] [place])    Frostbite of both feet 01/18/2018    hospitalized at [facility]. V's - required debridement of both feet    History of seizure disorder     \"had seizures when younger d/t abilify\" (med record from [facility])    Hypertension     (unknown date of diagnosis, in med record from [facility])       CURRENT ALLERGIES: Abilify [aripiprazole], Codeine, and Lithium REVIEW OF SYSTEMS: Review of system is limited by the patient developmental delay.      Past Surgical History:   Procedure Laterality Date    CARDIAC SURGERY  1985    AT BIRTH    LUNG SURGERY  09/2014    lung mass removal (med record from [facility])    PILONIDAL CYST DRAINAGE  2005    Social History:  Social History     Tobacco Use    Smoking status: Never Smoker    Smokeless tobacco: Never Used   Substance Use Topics    Alcohol use: No    Drug use: No        CURRENT MEDICATIONS:        Outpatient Medications Marked as Taking for the 5/17/22 encounter (Office Visit) with Anita Montoya MD   Medication Sig Dispense Refill    diazePAM (VALIUM) 5 MG tablet       valproic acid (DEPACON) 250 MG/5ML SOLN oral solution       ENULOSE 10 GM/15ML SOLN solution       metoprolol tartrate (LOPRESSOR) 25 MG tablet Take 1 tablet by mouth 2 times daily 180 tablet 3    mineral oil-hydrophilic petrolatum (AQUAPHOR) ointment Apply topically 2 times daily Apply topically to bilateral feet      divalproex (DEPAKOTE) 125 MG DR tablet Take 375 mg by mouth every morning Pt is to take 125mg and a 250mg tablet every morning      QUEtiapine (SEROQUEL) 300 MG tablet Take 300 mg by mouth nightly Along with a 50 mg tablet to equal 350 mg      promethazine (PHENERGAN) 25 MG suppository Place 25 mg rectally every 6 hours as needed for Nausea      acetaminophen (TYLENOL) 650 MG suppository Place 650 mg rectally every 4 hours as needed for Fever      ibuprofen (ADVIL;MOTRIN) 600 MG tablet Take 600 mg by mouth every 6 hours as needed for Fever      LORazepam (ATIVAN) 1 MG tablet Take 1 mg by mouth.  docusate sodium (COLACE) 100 MG capsule Take 1 capsule by mouth 2 times daily as needed for Constipation 20 capsule 0    divalproex (DEPAKOTE) 250 MG DR tablet Take 250 mg by mouth daily (with breakfast)      divalproex (DEPAKOTE) 250 MG DR tablet Take 1,250 mg by mouth nightly      OXcarbazepine (TRILEPTAL) 600 MG tablet Take 600 mg by mouth 3 times daily      QUEtiapine (SEROQUEL) 200 MG tablet Take 500 mg by mouth Daily with lunch       acetaminophen (TYLENOL) 325 MG tablet Take 650 mg by mouth every 4 hours as needed for Pain or Fever      loratadine (CLARITIN) 10 MG tablet Take 10 mg by mouth daily as needed      busPIRone (BUSPAR) 30 MG tablet take 1 tablet by mouth twice a day  0    enalapril (VASOTEC) 10 MG tablet Take 10 mg by mouth daily          FAMILY HISTORY: family history includes Diabetes in his father; Emphysema in his mother; High Blood Pressure in his father; Other in his father, mother, and sister. Physical Examination:      /70 (Site: Right Upper Arm, Position: Sitting, Cuff Size: Large Adult)   Pulse 79   Resp 18   Ht 5' 4.02\" (1.626 m)   Wt 216 lb 9.6 oz (98.2 kg)   BMI 37.16 kg/m²  Body mass index is 37.16 kg/m². Constitutional: Looks comfortable. No acute distress. [place] HEENT: Normocephalic and atraumatic. No JVD present. Carotid bruit is not present. No mass and no thyromegaly present. No lymphadenopathy present. Cardiovascular: Normal rate, regular rhythm, normal heart sounds. Exam reveals no gallop and no friction rubs. 2/6 systolic murmur, 2nd intercostal space on the LEFT just lateral to the sternum. Pulmonary/Chest: Effort normal and breath sounds normal. No respiratory distress.  He has no wheezes, rhonchi or rales. Abdominal: Soft, non-tender. Bowel sounds and aorta are normal. He exhibits no organomegaly, mass or bruit. Extremities: No edema. No cyanosis or clubbing. 2+ radial and carotid pulses. Distal extremity pulses: 2+ bilaterally. .  Neurological: Patient has significant developmental delay but able to answer yes/no questions. Skin: Skin is warm and dry. There is no rash or diaphoresis. Psychiatric: He is pleasant today. MOST RECENT LABS ON RECORD:   Lab Results   Component Value Date    WBC 6.1 03/04/2022    HGB 15.9 03/04/2022    HCT 49.7 03/04/2022    PLT See Reflexed IPF Result 03/04/2022    CHOL 160 03/04/2022    TRIG 155 (H) 03/04/2022    HDL 49 03/04/2022    ALT 17 03/04/2022    AST 15 03/04/2022     03/04/2022    K 4.4 03/04/2022    CL 99 03/04/2022    CREATININE 0.73 03/04/2022    BUN 15 03/04/2022    CO2 28 03/04/2022    TSH 1.785 11/13/2012    LABA1C 5.1 03/04/2022         ASSESSMENT:     1. Nonrheumatic aortic valve stenosis    2. Coarctation of aorta    3. Primary hypertension    4. Congenital heart disease    5. Aortic valve disorder    6. Essential hypertension    7. Sinus tachycardia       PLAN:      · Congenital aortic valve disease: asymptomatic   · Moderate aortic regurgitation and mild to moderate aortic stenosis VIA on Echo 2/23/2022  · Anomalous origin of left subclavian artery. · Currently asymptomatic. · We will get a repeat echo in 6 months. · Beta Blocker: Continue Metoprolol tartrate (Lopressor) 25 mg bid. · ACE Inibitor/ARB: Continue enalapril (Vasotec) 10 mg daily     · History of Abnormal ECG:  ·  ECG done on 3/14/2022 is essentially unchanged from prior. · Continue current therapy and follow up    · Essential Hypertension: Controlled  · Beta Blocker: Continue Metoprolol tartrate (Lopressor) 25 mg bid. · ACE Inibitor/ARB: Continue enalapril (Vasotec) 10 mg daily   · Diuretics: Not indicated at this time.       Sinus tachycardia: EKG done 3/14/2022 heart rate is 79  · Beta Blocker: Continue Metoprolol tartrate (Lopressor) 25 mg bid. In the meantime I asked him to continue taking his other medications and follow up with you as previously scheduled. FOLLOW UP:   I told Mr. Luz parekh to call my office if he had any problems, but otherwise told him to Return in about 6 months (around 11/17/2022). However, I would be happy to see him sooner should the need arise. Once again, thank you for allowing me to participate in this patients care. Please do not hesitate to contact me could I be of further assistance. Sincerely,  Dulce Mcginnis MD, F.A.C.C. [facility] Cardiology Specialist    [address] [place] [address], [address] [place] [address]  Phone: [phone], Fax: [phone]     I believe that the risk of significant morbidity and mortality related to the patient's current medical conditions are: Intermediate. Approximately 25 minutes were spent during prep work, discussion and exam of the patient, and follow up documentation and all of their questions were answered. The documentation recorded by the scribe, accurately and completely reflects the services I personally performed and the decisions made by me. Dulce Mcginnis MD, F.A.C.C.  May 17, 2022

## 2022-06-03 ENCOUNTER — HOSPITAL ENCOUNTER (OUTPATIENT)
Age: 37
Setting detail: SPECIMEN
Discharge: HOME | End: 2022-06-03
Payer: MEDICARE

## 2022-06-03 LAB
ALT SERPL-CCNC: 12 U/L (ref 5–41)
AST SERPL-CCNC: 14 U/L

## 2022-06-03 PROCEDURE — 84450 TRANSFERASE (AST) (SGOT): CPT

## 2022-06-03 PROCEDURE — 84460 ALANINE AMINO (ALT) (SGPT): CPT

## 2022-06-03 PROCEDURE — P9603 ONE-WAY ALLOW PRORATED MILES: HCPCS

## 2022-06-03 PROCEDURE — 36415 COLL VENOUS BLD VENIPUNCTURE: CPT

## 2022-07-28 ENCOUNTER — APPOINTMENT (OUTPATIENT)
Dept: GENERAL RADIOLOGY | Age: 37
End: 2022-07-28
Payer: MEDICARE

## 2022-07-28 ENCOUNTER — HOSPITAL ENCOUNTER (EMERGENCY)
Age: 37
Discharge: HOME | End: 2022-07-28
Attending: EMERGENCY MEDICINE
Payer: MEDICARE

## 2022-07-28 VITALS
RESPIRATION RATE: 16 BRPM | SYSTOLIC BLOOD PRESSURE: 120 MMHG | DIASTOLIC BLOOD PRESSURE: 37 MMHG | OXYGEN SATURATION: 95 % | TEMPERATURE: 97.5 F | HEART RATE: 82 BPM

## 2022-07-28 DIAGNOSIS — S60.00XA CONTUSION OF LEFT HAND INCLUDING FINGERS, INITIAL ENCOUNTER: Primary | ICD-10-CM

## 2022-07-28 DIAGNOSIS — S60.222A CONTUSION OF LEFT HAND INCLUDING FINGERS, INITIAL ENCOUNTER: Primary | ICD-10-CM

## 2022-07-28 PROCEDURE — 73130 X-RAY EXAM OF HAND: CPT

## 2022-07-28 PROCEDURE — 99283 EMERGENCY DEPT VISIT LOW MDM: CPT

## 2022-07-28 ASSESSMENT — PAIN - FUNCTIONAL ASSESSMENT: PAIN_FUNCTIONAL_ASSESSMENT: NONE - DENIES PAIN

## 2022-07-28 NOTE — ED PROVIDER NOTES
HPI:  7/28/22,   Time: 8:59 AM EDT         Carter Trotter is a 31 y.o. male presenting to the ED for punched a wall with his left hand when he was aggravated, beginning this morning ago. The complaint has been constant, moderate in severity, and worsened by changing position. No other injuries and no numbness throughout the fingers    ROS:   Pertinent positives and negatives are stated within HPI, all other systems reviewed and are negative.  --------------------------------------------- PAST HISTORY ---------------------------------------------  Past Medical History:  has a past medical history of Aortic valve defect, Aortic valve stenosis, Bipolar disorder ([facility] [address] Chronic mental illness, Coarctation of aorta, Depression, [facility] Frostbite of both feet, History of seizure disorder, and Hypertension. Past Surgical History:  has a past surgical history that includes Cardiac surgery (1987); Pilonidal cyst drainage (2005); and Lung surgery (09/2014). Social History:  reports that he has never smoked. He has never used smokeless tobacco. He reports that he does not drink alcohol and does not use drugs. Family History: family history includes Diabetes in his father; Emphysema in his mother; High Blood Pressure in his father; Other in his father, mother, and sister. The patients home medications have been reviewed. Allergies: Abilify [aripiprazole], Codeine, and Lithium    -------------------------------------------------- RESULTS -------------------------------------------------  All laboratory and radiology results have been personally reviewed by myself   LABS:  No results found for this visit on 07/28/22.     RADIOLOGY:  Interpreted by Radiologist.  XR HAND LEFT (MIN 3 VIEWS)    (Results Pending)       ------------------------- NURSING NOTES AND VITALS REVIEWED ---------------------------   The nursing notes within the ED encounter and vital signs as below have been reviewed. BP (!) 151/66   Pulse 86   Temp 97.5 °F (36.4 °C) (Tympanic)   Resp 16   SpO2 96%   Oxygen Saturation Interpretation: Normal      ---------------------------------------------------PHYSICAL EXAM--------------------------------------      Constitutional/General: Alert and oriented x3, well appearing, non toxic in NAD  Head: NC/AT  Eyes: PERRL, EOMI  Mouth: Oropharynx clear, handling secretions, no trismus  Neck: Supple, full ROM, no meningeal signs  Pulmonary: Lungs clear to auscultation bilaterally, no wheezes, rales, or rhonchi. Not in respiratory distress  Cardiovascular:  Regular rate and rhythm, no murmurs, gallops, or rubs. 2+ distal pulses  Abdomen: Soft, non tender, non distended,   Extremities: Moves all extremities x 4. Warm and well perfused, left hand: Moderate tenderness to palpation and slight swelling over the third and fourth MCP joints but full range of motion and neurovascular tact  Skin: warm and dry without rash  Neurologic: GCS 15,  Psych: Normal Affect      ------------------------------ ED COURSE/MEDICAL DECISION MAKING----------------------  Medications - No data to display      Medical Decision Making:    Left hand injury rule out fracture, x-ray unremarkable    Counseling: The emergency provider has spoken with the patient and discussed todays results, in addition to providing specific details for the plan of care and counseling regarding the diagnosis and prognosis. Questions are answered at this time and they are agreeable with the plan.      --------------------------------- IMPRESSION AND DISPOSITION ---------------------------------    IMPRESSION  1.  Contusion of left hand including fingers, initial encounter Stable       DISPOSITION  Disposition: Discharge to home  Patient condition is stable                  Xiomara Shepard MD  07/28/22 [id number]

## 2022-08-30 ENCOUNTER — HOSPITAL ENCOUNTER (OUTPATIENT)
Age: 37
Discharge: HOME | End: 2022-09-01
Payer: MEDICARE

## 2022-08-30 ENCOUNTER — HOSPITAL ENCOUNTER (OUTPATIENT)
Age: 37
Setting detail: SPECIMEN
Discharge: HOME | End: 2022-08-30
Payer: MEDICARE

## 2022-08-30 ENCOUNTER — HOSPITAL ENCOUNTER (OUTPATIENT)
Dept: GENERAL RADIOLOGY | Age: 37
Discharge: HOME | End: 2022-09-01
Payer: MEDICARE

## 2022-08-30 DIAGNOSIS — Z01.818 PRE-OP EVALUATION: ICD-10-CM

## 2022-08-30 PROCEDURE — 71046 X-RAY EXAM CHEST 2 VIEWS: CPT

## 2022-08-31 ENCOUNTER — HOSPITAL ENCOUNTER (OUTPATIENT)
Age: 37
Setting detail: SPECIMEN
Discharge: HOME | End: 2022-08-31
Payer: MEDICARE

## 2022-08-31 LAB
ABSOLUTE EOS #: 0.18 K/UL (ref 0–0.44)
ABSOLUTE IMMATURE GRANULOCYTE: 0.05 K/UL (ref 0–0.3)
ABSOLUTE LYMPH #: 2.16 K/UL (ref 1.1–3.7)
ABSOLUTE MONO #: 0.76 K/UL (ref 0.1–1.2)
ANION GAP SERPL CALCULATED.3IONS-SCNC: 11 MMOL/L (ref 9–17)
BASOPHILS # BLD: 1 % (ref 0–2)
BASOPHILS ABSOLUTE: 0.06 K/UL (ref 0–0.2)
BUN BLDV-MCNC: 12 MG/DL (ref 6–20)
BUN/CREAT BLD: 16 (ref 9–20)
CALCIUM SERPL-MCNC: 9.4 MG/DL (ref 8.6–10.4)
CHLORIDE BLD-SCNC: 97 MMOL/L (ref 98–107)
CO2: 26 MMOL/L (ref 20–31)
CREAT SERPL-MCNC: 0.74 MG/DL (ref 0.7–1.2)
EOSINOPHILS RELATIVE PERCENT: 2 % (ref 1–4)
GFR AFRICAN AMERICAN: >60 ML/MIN
GFR NON-AFRICAN AMERICAN: >60 ML/MIN
GFR SERPL CREATININE-BSD FRML MDRD: ABNORMAL ML/MIN/{1.73_M2}
GFR SERPL CREATININE-BSD FRML MDRD: ABNORMAL ML/MIN/{1.73_M2}
GLUCOSE BLD-MCNC: 87 MG/DL (ref 70–99)
HCT VFR BLD CALC: 42.7 % (ref 40.7–50.3)
HEMOGLOBIN: 14 G/DL (ref 13–17)
IMMATURE GRANULOCYTES: 1 %
LYMPHOCYTES # BLD: 29 % (ref 24–43)
MCH RBC QN AUTO: 29 PG (ref 25.2–33.5)
MCHC RBC AUTO-ENTMCNC: 32.8 G/DL (ref 28.4–34.8)
MCV RBC AUTO: 88.6 FL (ref 82.6–102.9)
MONOCYTES # BLD: 10 % (ref 3–12)
NRBC AUTOMATED: 0 PER 100 WBC
PDW BLD-RTO: 13.4 % (ref 11.8–14.4)
PLATELET # BLD: 146 K/UL (ref 138–453)
PMV BLD AUTO: 9.8 FL (ref 8.1–13.5)
POTASSIUM SERPL-SCNC: 4.1 MMOL/L (ref 3.7–5.3)
RBC # BLD: 4.82 M/UL (ref 4.21–5.77)
SEG NEUTROPHILS: 57 % (ref 36–65)
SEGMENTED NEUTROPHILS ABSOLUTE COUNT: 4.37 K/UL (ref 1.5–8.1)
SODIUM BLD-SCNC: 134 MMOL/L (ref 135–144)
WBC # BLD: 7.6 K/UL (ref 3.5–11.3)

## 2022-08-31 PROCEDURE — 80048 BASIC METABOLIC PNL TOTAL CA: CPT

## 2022-08-31 PROCEDURE — 36415 COLL VENOUS BLD VENIPUNCTURE: CPT

## 2022-08-31 PROCEDURE — 85025 COMPLETE CBC W/AUTO DIFF WBC: CPT

## 2022-09-09 ENCOUNTER — HOSPITAL ENCOUNTER (OUTPATIENT)
Age: 37
Setting detail: SPECIMEN
Discharge: HOME | End: 2022-09-09
Payer: MEDICARE

## 2022-09-09 LAB
ABSOLUTE EOS #: <0.03 K/UL (ref 0–0.44)
ABSOLUTE IMMATURE GRANULOCYTE: 0.05 K/UL (ref 0–0.3)
ABSOLUTE LYMPH #: 0.55 K/UL (ref 1.1–3.7)
ABSOLUTE MONO #: 0.97 K/UL (ref 0.1–1.2)
ALBUMIN SERPL-MCNC: 5 G/DL (ref 3.5–5.2)
ALBUMIN/GLOBULIN RATIO: 2.4 (ref 1–2.5)
ALP BLD-CCNC: 62 U/L (ref 40–129)
ALT SERPL-CCNC: 27 U/L (ref 5–41)
AMMONIA: 58 UMOL/L (ref 16–60)
ANION GAP SERPL CALCULATED.3IONS-SCNC: 17 MMOL/L (ref 9–17)
AST SERPL-CCNC: 30 U/L
BASOPHILS # BLD: 0 % (ref 0–2)
BASOPHILS ABSOLUTE: 0.03 K/UL (ref 0–0.2)
BILIRUB SERPL-MCNC: 0.4 MG/DL (ref 0.3–1.2)
BUN BLDV-MCNC: 13 MG/DL (ref 6–20)
BUN/CREAT BLD: 16 (ref 9–20)
CALCIUM SERPL-MCNC: 9.9 MG/DL (ref 8.6–10.4)
CHLORIDE BLD-SCNC: 95 MMOL/L (ref 98–107)
CO2: 24 MMOL/L (ref 20–31)
CREAT SERPL-MCNC: 0.83 MG/DL (ref 0.7–1.2)
EOSINOPHILS RELATIVE PERCENT: 0 % (ref 1–4)
ESTIMATED AVERAGE GLUCOSE: 97 MG/DL
GFR AFRICAN AMERICAN: >60 ML/MIN
GFR NON-AFRICAN AMERICAN: >60 ML/MIN
GFR SERPL CREATININE-BSD FRML MDRD: ABNORMAL ML/MIN/{1.73_M2}
GFR SERPL CREATININE-BSD FRML MDRD: ABNORMAL ML/MIN/{1.73_M2}
GLUCOSE BLD-MCNC: 110 MG/DL (ref 70–99)
HBA1C MFR BLD: 5 % (ref 4–6)
HCT VFR BLD CALC: 44.5 % (ref 40.7–50.3)
HEMOGLOBIN: 14.8 G/DL (ref 13–17)
IMMATURE GRANULOCYTES: 1 %
LYMPHOCYTES # BLD: 6 % (ref 24–43)
MCH RBC QN AUTO: 28.7 PG (ref 25.2–33.5)
MCHC RBC AUTO-ENTMCNC: 33.3 G/DL (ref 28.4–34.8)
MCV RBC AUTO: 86.2 FL (ref 82.6–102.9)
MONOCYTES # BLD: 11 % (ref 3–12)
NRBC AUTOMATED: 0 PER 100 WBC
PDW BLD-RTO: 13.6 % (ref 11.8–14.4)
PLATELET # BLD: 155 K/UL (ref 138–453)
PMV BLD AUTO: 9.6 FL (ref 8.1–13.5)
POTASSIUM SERPL-SCNC: 4 MMOL/L (ref 3.7–5.3)
RBC # BLD: 5.16 M/UL (ref 4.21–5.77)
SEG NEUTROPHILS: 82 % (ref 36–65)
SEGMENTED NEUTROPHILS ABSOLUTE COUNT: 7.19 K/UL (ref 1.5–8.1)
SODIUM BLD-SCNC: 136 MMOL/L (ref 135–144)
TOTAL PROTEIN: 7.1 G/DL (ref 6.4–8.3)
VALPROIC ACID LEVEL: 95 UG/ML (ref 50–125)
WBC # BLD: 8.8 K/UL (ref 3.5–11.3)

## 2022-09-09 PROCEDURE — 80183 DRUG SCRN QUANT OXCARBAZEPIN: CPT

## 2022-09-09 PROCEDURE — 85025 COMPLETE CBC W/AUTO DIFF WBC: CPT

## 2022-09-09 PROCEDURE — 36415 COLL VENOUS BLD VENIPUNCTURE: CPT

## 2022-09-09 PROCEDURE — P9603 ONE-WAY ALLOW PRORATED MILES: HCPCS

## 2022-09-09 PROCEDURE — 83036 HEMOGLOBIN GLYCOSYLATED A1C: CPT

## 2022-09-09 PROCEDURE — 80053 COMPREHEN METABOLIC PANEL: CPT

## 2022-09-09 PROCEDURE — 80164 ASSAY DIPROPYLACETIC ACD TOT: CPT

## 2022-09-09 PROCEDURE — 82140 ASSAY OF AMMONIA: CPT

## 2022-09-11 LAB — OXCARBAZEPINE: 31 UG/ML (ref 3–35)

## 2022-10-17 ENCOUNTER — HOSPITAL ENCOUNTER (OUTPATIENT)
Age: 37
Discharge: HOME | End: 2022-10-17
Payer: MEDICARE

## 2022-10-17 PROCEDURE — 93005 ELECTROCARDIOGRAM TRACING: CPT | Performed by: FAMILY MEDICINE

## 2022-10-18 LAB
EKG ATRIAL RATE: 85 BPM
EKG P AXIS: 53 DEGREES
EKG P-R INTERVAL: 190 MS
EKG Q-T INTERVAL: 352 MS
EKG QRS DURATION: 100 MS
EKG QTC CALCULATION (BAZETT): 418 MS
EKG R AXIS: 12 DEGREES
EKG T AXIS: 12 DEGREES
EKG VENTRICULAR RATE: 85 BPM

## 2022-11-15 ENCOUNTER — OFFICE VISIT (OUTPATIENT)
Dept: CARDIOLOGY | Age: 37
End: 2022-11-15
Payer: MEDICARE

## 2022-11-15 ENCOUNTER — HOSPITAL ENCOUNTER (OUTPATIENT)
Dept: NON INVASIVE DIAGNOSTICS | Age: 37
Discharge: HOME | End: 2022-11-15
Payer: MEDICARE

## 2022-11-15 VITALS
BODY MASS INDEX: 36.06 KG/M2 | WEIGHT: 211.2 LBS | RESPIRATION RATE: 18 BRPM | SYSTOLIC BLOOD PRESSURE: 91 MMHG | OXYGEN SATURATION: 98 % | DIASTOLIC BLOOD PRESSURE: 55 MMHG | HEIGHT: 64 IN | HEART RATE: 71 BPM

## 2022-11-15 DIAGNOSIS — R94.31 ABNORMAL EKG: ICD-10-CM

## 2022-11-15 DIAGNOSIS — R00.0 SINUS TACHYCARDIA: ICD-10-CM

## 2022-11-15 DIAGNOSIS — Q25.48 LEFT SUBCLAVIAN ARTERY, ANOMALOUS ORIGIN: ICD-10-CM

## 2022-11-15 DIAGNOSIS — I10 ESSENTIAL HYPERTENSION: ICD-10-CM

## 2022-11-15 DIAGNOSIS — Q25.1 COARCTATION OF AORTA: Chronic | ICD-10-CM

## 2022-11-15 DIAGNOSIS — I35.0 NONRHEUMATIC AORTIC VALVE STENOSIS: Primary | ICD-10-CM

## 2022-11-15 DIAGNOSIS — I35.0 AORTIC VALVE STENOSIS, ETIOLOGY OF CARDIAC VALVE DISEASE UNSPECIFIED: Chronic | ICD-10-CM

## 2022-11-15 LAB
LV EF: 60 %
LVEF MODALITY: NORMAL

## 2022-11-15 PROCEDURE — 1036F TOBACCO NON-USER: CPT | Performed by: INTERNAL MEDICINE

## 2022-11-15 PROCEDURE — 93306 TTE W/DOPPLER COMPLETE: CPT

## 2022-11-15 PROCEDURE — G8484 FLU IMMUNIZE NO ADMIN: HCPCS | Performed by: INTERNAL MEDICINE

## 2022-11-15 PROCEDURE — 99213 OFFICE O/P EST LOW 20 MIN: CPT | Performed by: INTERNAL MEDICINE

## 2022-11-15 PROCEDURE — 3074F SYST BP LT 130 MM HG: CPT | Performed by: INTERNAL MEDICINE

## 2022-11-15 PROCEDURE — G8417 CALC BMI ABV UP PARAM F/U: HCPCS | Performed by: INTERNAL MEDICINE

## 2022-11-15 PROCEDURE — 99211 OFF/OP EST MAY X REQ PHY/QHP: CPT | Performed by: INTERNAL MEDICINE

## 2022-11-15 PROCEDURE — G8427 DOCREV CUR MEDS BY ELIG CLIN: HCPCS | Performed by: INTERNAL MEDICINE

## 2022-11-15 PROCEDURE — 3078F DIAST BP <80 MM HG: CPT | Performed by: INTERNAL MEDICINE

## 2022-11-15 NOTE — PATIENT INSTRUCTIONS
SURVEY:    You may be receiving a survey from [employer] regarding your visit today. Please complete the survey to enable us to provide the highest quality of care to you and your family. If you cannot score us a very good on any question, please call the office to discuss how we could have made your experience a very good one. Thank you.

## 2022-11-15 NOTE — PROGRESS NOTES
Radha Saldaña am scribing for and in the presence of Yvonne Johnson MD, F.A.C.C. Patient: Shirlene Durand  : 1985  Date of Visit: November 15, 2022    REASON FOR VISIT / CONSULTATION: Follow-up (HX:congential heart disease, aortic valve stenosis Pt is here for 6 month follow up he states he is doing well denies:CP,SOB,lightheaded/dizziness,palp )    History of Present Illness:        Dear Jolanta Real MD    I had the pleasure of seeing your patient Shirlene Durand for his yearly follow-up. As you know, Mr. Anita Gutierrez is a 30 y.o. male with profound cognitive impairment and congenital aortic valve disease is with his most recent echo showing aortic regurgitation and aortic stenosis. Also his CTA on 2009 showed no significant aortic coarctation with anomalous left subclavian artery. He is here today for follow-up. As per home health aide, there is no new symptoms. He is fairly active in the facility without significant exertional symptoms. Unfortunately, patient cannot give any meaningful history. Other medical problems include bipolar disorder, hypertension, explosive personality disorder and seizure. Echo done on 2019: EF > 65%, The left ventricular cavity size is within normal limits and the left ventricular wall thickness is mildly increased. No definite specific wall motion abnormalities were identified. The mean aortic valve gradient is 20 mmHg consistent with moderate aortic stenosis. Moderate aortic insufficiency was seen. Evidence of moderate (grade II) diastolic dysfunction is seen. EKG done on 3/9/2020: Sinus rhythm with 1st degree A-V block    EKG done on 2020: sinus tachycardia, biatrial enlargement    Echo done on 2020: EF 60%, The left ventricular cavity size is within normal limits and the left ventricular wall thickness is moderately increased. No definite specific wall motion abnormalities were identified.  The mean aortic valve gradient is 20 mmHg consistent with moderate aortic stenosis. Moderate aortic insufficiency was seen. Evidence of mild (grade I) diastolic dysfunction is seen. I reviewed his EKG 9/2021 and it showed sinus rhythm with no change from prior. Echo done on 10/5/2021- EF 60%  The left ventricular cavity size is mildly enlarged and the left ventricular wall thickness is mildly increased. No definite specific wall motion abnormalities were identified. The left atrium is moderately dilated (34-39) with a left atrial volume index of 35 ml/m2. Aortic leaflet calcification with mild aortic stenosis with a mean gradient of 19 mmHg. Moderate to severe aortic regurgitation was seen. Echo done on 2/23/2022- ejection fraction of 55%. The left ventricular cavity size is mildly enlarged and the left ventricular wall thickness is mildly increased. The mean aortic valve gradient is 19 mmHg consistent with mild to moderate aortic stenosis. At least moderate aortic insufficiency was seen. Evidence of mild (grade I) diastolic dysfunction is seen    Mr. Sandie Medina is here today for a six month follow up. He is doing well today. Again history is markedly limited by the patient profound mental delay. His aide with him denies any problems and says he continues to do well. She reports no passing out episodes. Currently reported asymptomatic from his aid. Also denied any worsening shortness of breath or falls due to dizziness. He is eating and drinking ok. He also sleeps well at night.      PAST MEDICAL HISTORY:        Past Medical History:   Diagnosis Date    Aortic valve defect birth    Aortic valve stenosis     (unknown date of diagnosis, in med record from [place] [id number])    Bipolar disorder [facility])     (unknown date of diagnosis, in med record from [place] [address])    Chronic mental illness     Coarctation of aorta     (unknown date of diagnosis, in med record from [place] [address]) Depression     (unknown date of diagnosis, in med record from [facility])    Explosive personality disorder [facility])     (unknown date of diagnosis, in med record from [facility])    Frostbite of both feet 01/18/2018    hospitalized at [facility]. V's - required debridement of both feet    History of seizure disorder     \"had seizures when younger d/t abilify\" (med record from [facility])    Hypertension     (unknown date of diagnosis, in med record from [facility])       CURRENT ALLERGIES: Abilify [aripiprazole], Codeine, and Lithium REVIEW OF SYSTEMS: Review of system is limited by the patient developmental delay.      Past Surgical History:   Procedure Laterality Date    CARDIAC SURGERY  1985    AT BIRTH    LUNG SURGERY  09/2014    lung mass removal (med record from [facility])    PILONIDAL CYST DRAINAGE  2005    Social History:  Social History     Tobacco Use    Smoking status: Never    Smokeless tobacco: Never   Substance Use Topics    Alcohol use: No    Drug use: No        CURRENT MEDICATIONS:        Outpatient Medications Marked as Taking for the 11/15/22 encounter (Office Visit) with Gloria Gomez MD   Medication Sig Dispense Refill    valproic acid (DEPACON) 250 MG/5ML SOLN oral solution       ENULOSE 10 GM/15ML SOLN solution       metoprolol tartrate (LOPRESSOR) 25 MG tablet Take 1 tablet by mouth 2 times daily 180 tablet 3    mineral oil-hydrophilic petrolatum (AQUAPHOR) ointment Apply topically 2 times daily Apply topically to bilateral feet      QUEtiapine (SEROQUEL) 300 MG tablet Take 300 mg by mouth nightly Along with a 50 mg tablet to equal 350 mg      ibuprofen (ADVIL;MOTRIN) 600 MG tablet Take 600 mg by mouth every 6 hours as needed for Fever      docusate sodium (COLACE) 100 MG capsule Take 1 capsule by mouth 2 times daily as needed for Constipation 20 capsule 0    OXcarbazepine (TRILEPTAL) 600 MG tablet Take 600 mg by mouth 3 times daily      QUEtiapine (SEROQUEL) 200 MG tablet Take 500 mg by mouth Daily with lunch       loratadine (CLARITIN) 10 MG tablet Take 10 mg by mouth daily as needed      enalapril (VASOTEC) 10 MG tablet Take 10 mg by mouth daily          FAMILY HISTORY: family history includes Diabetes in his father; Emphysema in his mother; High Blood Pressure in his father; Other in his father, mother, and sister. Physical Examination:      BP (!) 91/55 (Site: Left Upper Arm, Position: Sitting, Cuff Size: Medium Adult)   Pulse 71   Resp 18   Ht 5' 4\" (1.626 m)   Wt 211 lb 3.2 oz (95.8 kg)   SpO2 98%   BMI 36.25 kg/m²  Body mass index is 36.25 kg/m². Constitutional: Looks comfortable. No acute distress. A[place] L[place] HEENT: Normocephalic and atraumatic. No JVD present. Carotid bruit is not present. No mass and no thyromegaly present. No lymphadenopathy present. Cardiovascular: Normal rate, regular rhythm, normal heart sounds. Exam reveals no gallop and no friction rubs. 2/6 systolic murmur, 2nd intercostal space on the LEFT just lateral to the sternum. Pulmonary/Chest: Effort normal and breath sounds normal. No respiratory distress. He has no wheezes, rhonchi or rales. Abdominal: Soft, non-tender. Bowel sounds and aorta are normal. He exhibits no organomegaly, mass or bruit. Extremities: No edema. No cyanosis or clubbing. 2+ radial and carotid pulses. Distal extremity pulses: 2+ bilaterally. .  Neurological: Patient has significant developmental delay but able to answer yes/no questions. Skin: Skin is warm and dry. There is no rash or diaphoresis. Psychiatric: He is pleasant today.      MOST RECENT LABS ON RECORD:   Lab Results   Component Value Date    WBC 8.8 09/09/2022    HGB 14.8 09/09/2022    HCT 44.5 09/09/2022     09/09/2022    CHOL 160 03/04/2022    TRIG 155 (H) 03/04/2022    HDL 49 03/04/2022    ALT 27 09/09/2022    AST 30 09/09/2022     09/09/2022    K 4.0 09/09/2022    CL 95 (L) 09/09/2022 CREATININE 0.83 09/09/2022    BUN 13 09/09/2022    CO2 24 09/09/2022    TSH 1.785 11/13/2012    LABA1C 5.0 09/09/2022         ASSESSMENT:     1. Coarctation of aorta    2. Aortic valve stenosis, etiology of cardiac valve disease unspecified    3. Abnormal EKG    4. Essential hypertension    5. Sinus tachycardia      PLAN:      Congenital Aortic Valve Disease/ Moderate aortic regurgitation and mild to moderate aortic stenosis VIA on Echo 2/23/2022. Anomalous origin of left subclavian artery. Currently reported asymptomatic from his aid. Also denied any worsening shortness of breath or falls due to dizziness. He is eating and drinking ok. He also sleeps well at night. Beta Blocker: Continue Metoprolol tartrate (Lopressor) 25 mg bid. ACE Inibitor/ARB: Continue enalapril (Vasotec) 10 mg daily   Additional Testing: I Ordered an Echocardiogram to assess Mr. Nury Lizama's ejection fraction and to look for significant valvular heart disease as a source of Mr. Jenni Steinberg symptoms    History of Abnormal ECG:  Continue current therapy and follow up. Essential Hypertension: Controlled  Beta Blocker: Continue Metoprolol tartrate (Lopressor) 25 mg bid. ACE Inibitor/ARB: Continue enalapril (Vasotec) 10 mg daily     History of Sinus Tachycardia:   Beta Blocker: Continue Metoprolol tartrate (Lopressor) 25 mg bid. In the meantime I asked him to continue taking his other medications and follow up with you as previously scheduled. FOLLOW UP:   I told Mr. Jenni parekh to call my office if he had any problems, but otherwise told him to Return in about 1 year (around 11/15/2023). However, I would be happy to see him sooner should the need arise. Once again, thank you for allowing me to participate in this patients care. Please do not hesitate to contact me could I be of further assistance. Sincerely,  Sydnee Gamble MD, F.A.C.C.   [facility] Specialist    [place], [place] [address]  Phone: [phone], Fax: [phone]     I believe that the risk of significant morbidity and mortality related to the patient's current medical conditions are: Intermediate. Approximately 25 minutes were spent during prep work, discussion and exam of the patient, and follow up documentation and all of their questions were answered. The documentation recorded by the scribe, accurately and completely reflects the services I personally performed and the decisions made by me. Bethany Griffin MD, F.A.C.C.  November 15, 2022

## 2022-11-18 ENCOUNTER — TELEPHONE (OUTPATIENT)
Dept: CARDIOLOGY | Age: 37
End: 2022-11-18

## 2022-11-18 NOTE — TELEPHONE ENCOUNTER
----- Message from London Goel MD sent at 11/17/2022  9:51 PM EST -----  Echo is okay. No change from prior. Continue current therapy and follow-up. Please call with questions and/or concerns.   Thank you

## 2023-03-03 ENCOUNTER — HOSPITAL ENCOUNTER (OUTPATIENT)
Age: 38
Setting detail: SPECIMEN
Discharge: HOME | End: 2023-03-03
Payer: MEDICARE

## 2023-03-03 LAB
ABSOLUTE EOS #: 0.14 K/UL (ref 0–0.44)
ABSOLUTE IMMATURE GRANULOCYTE: 0.03 K/UL (ref 0–0.3)
ABSOLUTE LYMPH #: 2.13 K/UL (ref 1.1–3.7)
ABSOLUTE MONO #: 0.86 K/UL (ref 0.1–1.2)
ALBUMIN SERPL-MCNC: 4.5 G/DL (ref 3.5–5.2)
ALBUMIN/GLOBULIN RATIO: 2.6 (ref 1–2.5)
ALP SERPL-CCNC: 66 U/L (ref 40–129)
ALT SERPL-CCNC: 22 U/L (ref 5–41)
AMMONIA PLAS-SCNC: 68 UMOL/L (ref 16–60)
ANION GAP SERPL CALCULATED.3IONS-SCNC: 9 MMOL/L (ref 9–17)
AST SERPL-CCNC: 23 U/L
BASOPHILS # BLD: 1 % (ref 0–2)
BASOPHILS ABSOLUTE: 0.05 K/UL (ref 0–0.2)
BILIRUB SERPL-MCNC: 0.4 MG/DL (ref 0.3–1.2)
BUN SERPL-MCNC: 18 MG/DL (ref 6–20)
BUN/CREAT BLD: 23 (ref 9–20)
CALCIUM SERPL-MCNC: 9.4 MG/DL (ref 8.6–10.4)
CHLORIDE SERPL-SCNC: 102 MMOL/L (ref 98–107)
CHOLEST SERPL-MCNC: 167 MG/DL
CHOLESTEROL/HDL RATIO: 3.9
CO2 SERPL-SCNC: 27 MMOL/L (ref 20–31)
CREAT SERPL-MCNC: 0.79 MG/DL (ref 0.7–1.2)
EOSINOPHILS RELATIVE PERCENT: 2 % (ref 1–4)
GFR SERPL CREATININE-BSD FRML MDRD: >60 ML/MIN/1.73M2
GLUCOSE SERPL-MCNC: 86 MG/DL (ref 70–99)
HCT VFR BLD AUTO: 45.4 % (ref 40.7–50.3)
HDLC SERPL-MCNC: 43 MG/DL
HGB BLD-MCNC: 15.1 G/DL (ref 13–17)
IMMATURE GRANULOCYTES: 0 %
LDLC SERPL CALC-MCNC: 92 MG/DL (ref 0–130)
LYMPHOCYTES # BLD: 23 % (ref 24–43)
MCH RBC QN AUTO: 29.3 PG (ref 25.2–33.5)
MCHC RBC AUTO-ENTMCNC: 33.3 G/DL (ref 28.4–34.8)
MCV RBC AUTO: 88.2 FL (ref 82.6–102.9)
MONOCYTES # BLD: 9 % (ref 3–12)
NRBC AUTOMATED: 0 PER 100 WBC
PDW BLD-RTO: 13.7 % (ref 11.8–14.4)
PLATELET # BLD AUTO: ABNORMAL K/UL (ref 138–453)
PLATELET, FLUORESCENCE: 140 K/UL (ref 138–453)
PLATELET, IMMATURE FRACTION: 3.2 % (ref 1.1–10.3)
POTASSIUM SERPL-SCNC: 4.3 MMOL/L (ref 3.7–5.3)
PROT SERPL-MCNC: 6.2 G/DL (ref 6.4–8.3)
RBC # BLD: 5.15 M/UL (ref 4.21–5.77)
SEG NEUTROPHILS: 65 % (ref 36–65)
SEGMENTED NEUTROPHILS ABSOLUTE COUNT: 5.95 K/UL (ref 1.5–8.1)
SODIUM SERPL-SCNC: 138 MMOL/L (ref 135–144)
TRIGL SERPL-MCNC: 160 MG/DL
VALPROATE SERPL-MCNC: 73 UG/ML (ref 50–125)
WBC # BLD AUTO: 9.2 K/UL (ref 3.5–11.3)

## 2023-03-03 PROCEDURE — 80061 LIPID PANEL: CPT

## 2023-03-03 PROCEDURE — 85025 COMPLETE CBC W/AUTO DIFF WBC: CPT

## 2023-03-03 PROCEDURE — 80183 DRUG SCRN QUANT OXCARBAZEPIN: CPT

## 2023-03-03 PROCEDURE — 85055 RETICULATED PLATELET ASSAY: CPT

## 2023-03-03 PROCEDURE — 82140 ASSAY OF AMMONIA: CPT

## 2023-03-03 PROCEDURE — 80164 ASSAY DIPROPYLACETIC ACD TOT: CPT

## 2023-03-03 PROCEDURE — 83036 HEMOGLOBIN GLYCOSYLATED A1C: CPT

## 2023-03-03 PROCEDURE — 80053 COMPREHEN METABOLIC PANEL: CPT

## 2023-03-03 PROCEDURE — 36415 COLL VENOUS BLD VENIPUNCTURE: CPT

## 2023-03-27 ENCOUNTER — HOSPITAL ENCOUNTER (OUTPATIENT)
Age: 38
Discharge: HOME | End: 2023-03-27
Payer: MEDICARE

## 2023-03-27 LAB
EKG ATRIAL RATE: 84 BPM
EKG P AXIS: 62 DEGREES
EKG P-R INTERVAL: 192 MS
EKG Q-T INTERVAL: 360 MS
EKG QRS DURATION: 90 MS
EKG QTC CALCULATION (BAZETT): 425 MS
EKG R AXIS: -78 DEGREES
EKG T AXIS: 18 DEGREES
EKG VENTRICULAR RATE: 84 BPM

## 2023-03-27 PROCEDURE — 93010 ELECTROCARDIOGRAM REPORT: CPT | Performed by: INTERNAL MEDICINE

## 2023-03-27 PROCEDURE — 93005 ELECTROCARDIOGRAM TRACING: CPT | Performed by: FAMILY MEDICINE

## 2023-09-08 ENCOUNTER — HOSPITAL ENCOUNTER (OUTPATIENT)
Age: 38
Setting detail: SPECIMEN
Discharge: HOME | End: 2023-09-08
Payer: MEDICARE

## 2023-09-08 LAB
ALBUMIN SERPL-MCNC: 4.7 G/DL (ref 3.5–5.2)
ALBUMIN/GLOB SERPL: 3.4 {RATIO} (ref 1–2.5)
ALP SERPL-CCNC: 59 U/L (ref 40–129)
ALT SERPL-CCNC: 25 U/L (ref 5–41)
ANION GAP SERPL CALCULATED.3IONS-SCNC: 13 MMOL/L (ref 9–17)
AST SERPL-CCNC: 20 U/L
BILIRUB SERPL-MCNC: 0.2 MG/DL (ref 0.3–1.2)
BUN SERPL-MCNC: 24 MG/DL (ref 6–20)
BUN/CREAT SERPL: 34 (ref 9–20)
CALCIUM SERPL-MCNC: 9.2 MG/DL (ref 8.6–10.4)
CHLORIDE SERPL-SCNC: 110 MMOL/L (ref 98–107)
CO2 SERPL-SCNC: 25 MMOL/L (ref 20–31)
CREAT SERPL-MCNC: 0.7 MG/DL (ref 0.7–1.2)
GFR SERPL CREATININE-BSD FRML MDRD: >60 ML/MIN/1.73M2
GLUCOSE SERPL-MCNC: 97 MG/DL (ref 70–99)
POTASSIUM SERPL-SCNC: 3.9 MMOL/L (ref 3.7–5.3)
PROT SERPL-MCNC: 6.1 G/DL (ref 6.4–8.3)
SODIUM SERPL-SCNC: 148 MMOL/L (ref 135–144)
VALPROATE SERPL-MCNC: 74 UG/ML (ref 50–125)

## 2023-09-08 PROCEDURE — 80164 ASSAY DIPROPYLACETIC ACD TOT: CPT

## 2023-09-08 PROCEDURE — 80053 COMPREHEN METABOLIC PANEL: CPT

## 2023-09-08 PROCEDURE — P9604 ONE-WAY ALLOW PRORATED TRIP: HCPCS

## 2023-09-08 PROCEDURE — 36415 COLL VENOUS BLD VENIPUNCTURE: CPT

## 2023-09-08 PROCEDURE — 80183 DRUG SCRN QUANT OXCARBAZEPIN: CPT

## 2023-09-10 LAB — 10OH-CARBAZEPINE SERPL-MCNC: 25 UG/ML (ref 3–35)

## 2023-09-11 ENCOUNTER — HOSPITAL ENCOUNTER (OUTPATIENT)
Age: 38
Discharge: HOME | End: 2023-09-11
Payer: MEDICARE

## 2023-09-11 ENCOUNTER — HOSPITAL ENCOUNTER (OUTPATIENT)
Age: 38
Setting detail: SPECIMEN
Discharge: HOME | End: 2023-09-11

## 2023-09-11 LAB
EKG ATRIAL RATE: 82 BPM
EKG P AXIS: 47 DEGREES
EKG P-R INTERVAL: 192 MS
EKG Q-T INTERVAL: 358 MS
EKG QRS DURATION: 102 MS
EKG QTC CALCULATION (BAZETT): 418 MS
EKG R AXIS: -61 DEGREES
EKG T AXIS: 13 DEGREES
EKG VENTRICULAR RATE: 82 BPM

## 2023-09-11 PROCEDURE — 93005 ELECTROCARDIOGRAM TRACING: CPT | Performed by: INTERNAL MEDICINE

## 2023-09-11 PROCEDURE — 93010 ELECTROCARDIOGRAM REPORT: CPT | Performed by: INTERNAL MEDICINE

## 2023-09-12 ENCOUNTER — HOSPITAL ENCOUNTER (OUTPATIENT)
Age: 38
Setting detail: SPECIMEN
Discharge: HOME | End: 2023-09-12
Payer: MEDICARE

## 2023-09-12 LAB
AMMONIA PLAS-SCNC: 64 UMOL/L (ref 16–60)
BASOPHILS # BLD: 0.05 K/UL (ref 0–0.2)
BASOPHILS NFR BLD: 1 % (ref 0–2)
EOSINOPHIL # BLD: 0.17 K/UL (ref 0–0.44)
EOSINOPHILS RELATIVE PERCENT: 3 % (ref 1–4)
ERYTHROCYTE [DISTWIDTH] IN BLOOD BY AUTOMATED COUNT: 13.5 % (ref 11.8–14.4)
EST. AVERAGE GLUCOSE BLD GHB EST-MCNC: 97 MG/DL
HBA1C MFR BLD: 5 % (ref 4–6)
HCT VFR BLD AUTO: 45.6 % (ref 40.7–50.3)
HGB BLD-MCNC: 15 G/DL (ref 13–17)
IMM GRANULOCYTES # BLD AUTO: 0.04 K/UL (ref 0–0.3)
IMM GRANULOCYTES NFR BLD: 1 %
LYMPHOCYTES # BLD: 31 % (ref 24–43)
LYMPHOCYTES NFR BLD: 1.7 K/UL (ref 1.1–3.7)
MCH RBC QN AUTO: 28.9 PG (ref 25.2–33.5)
MCHC RBC AUTO-ENTMCNC: 32.9 G/DL (ref 28.4–34.8)
MCV RBC AUTO: 87.9 FL (ref 82.6–102.9)
MONOCYTES NFR BLD: 0.63 K/UL (ref 0.1–1.2)
MONOCYTES NFR BLD: 12 % (ref 3–12)
NEUTROPHILS NFR BLD: 52 % (ref 36–65)
NEUTS SEG NFR BLD: 2.88 K/UL (ref 1.5–8.1)
NRBC BLD-RTO: 0 PER 100 WBC
PLATELET # BLD AUTO: 150 K/UL (ref 138–453)
PMV BLD AUTO: 9.7 FL (ref 8.1–13.5)
RBC # BLD AUTO: 5.19 M/UL (ref 4.21–5.77)
WBC OTHER # BLD: 5.5 K/UL (ref 3.5–11.3)

## 2023-09-12 PROCEDURE — 83036 HEMOGLOBIN GLYCOSYLATED A1C: CPT

## 2023-09-12 PROCEDURE — 36415 COLL VENOUS BLD VENIPUNCTURE: CPT

## 2023-09-12 PROCEDURE — P9604 ONE-WAY ALLOW PRORATED TRIP: HCPCS

## 2023-09-12 PROCEDURE — 85025 COMPLETE CBC W/AUTO DIFF WBC: CPT

## 2023-09-12 PROCEDURE — 82140 ASSAY OF AMMONIA: CPT

## 2023-10-03 ENCOUNTER — HOSPITAL ENCOUNTER (OUTPATIENT)
Age: 38
Setting detail: SPECIMEN
Discharge: HOME | End: 2023-10-03
Payer: MEDICARE

## 2023-10-03 PROCEDURE — 36415 COLL VENOUS BLD VENIPUNCTURE: CPT

## 2023-10-03 PROCEDURE — 80183 DRUG SCRN QUANT OXCARBAZEPIN: CPT

## 2023-10-05 LAB — 10OH-CARBAZEPINE SERPL-MCNC: 27 UG/ML (ref 3–35)

## 2023-11-20 ENCOUNTER — HOSPITAL ENCOUNTER (OUTPATIENT)
Age: 38
Discharge: HOME | End: 2023-11-22
Payer: MEDICARE

## 2023-11-20 ENCOUNTER — HOSPITAL ENCOUNTER (OUTPATIENT)
Dept: GENERAL RADIOLOGY | Age: 38
Discharge: HOME | End: 2023-11-22
Payer: MEDICARE

## 2023-11-20 ENCOUNTER — OFFICE VISIT (OUTPATIENT)
Dept: CARDIOLOGY | Age: 38
End: 2023-11-20
Payer: MEDICARE

## 2023-11-20 VITALS
BODY MASS INDEX: 38.79 KG/M2 | SYSTOLIC BLOOD PRESSURE: 123 MMHG | HEIGHT: 64 IN | DIASTOLIC BLOOD PRESSURE: 60 MMHG | OXYGEN SATURATION: 97 % | HEART RATE: 81 BPM | RESPIRATION RATE: 18 BRPM | WEIGHT: 227.2 LBS

## 2023-11-20 DIAGNOSIS — Q24.9 CONGENITAL HEART DISEASE: Primary | ICD-10-CM

## 2023-11-20 DIAGNOSIS — R94.31 ABNORMAL EKG: ICD-10-CM

## 2023-11-20 DIAGNOSIS — I10 ESSENTIAL HYPERTENSION: ICD-10-CM

## 2023-11-20 DIAGNOSIS — S67.22XA CRUSH INJURY TO HAND, LEFT, INITIAL ENCOUNTER: ICD-10-CM

## 2023-11-20 DIAGNOSIS — R00.0 SINUS TACHYCARDIA: ICD-10-CM

## 2023-11-20 PROCEDURE — G8484 FLU IMMUNIZE NO ADMIN: HCPCS | Performed by: INTERNAL MEDICINE

## 2023-11-20 PROCEDURE — 3074F SYST BP LT 130 MM HG: CPT | Performed by: INTERNAL MEDICINE

## 2023-11-20 PROCEDURE — G8419 CALC BMI OUT NRM PARAM NOF/U: HCPCS | Performed by: INTERNAL MEDICINE

## 2023-11-20 PROCEDURE — G8427 DOCREV CUR MEDS BY ELIG CLIN: HCPCS | Performed by: INTERNAL MEDICINE

## 2023-11-20 PROCEDURE — 73130 X-RAY EXAM OF HAND: CPT

## 2023-11-20 PROCEDURE — 1036F TOBACCO NON-USER: CPT | Performed by: INTERNAL MEDICINE

## 2023-11-20 PROCEDURE — 99213 OFFICE O/P EST LOW 20 MIN: CPT | Performed by: INTERNAL MEDICINE

## 2023-11-20 PROCEDURE — 3078F DIAST BP <80 MM HG: CPT | Performed by: INTERNAL MEDICINE

## 2023-11-20 PROCEDURE — 99211 OFF/OP EST MAY X REQ PHY/QHP: CPT | Performed by: INTERNAL MEDICINE

## 2023-11-20 RX ORDER — CLONIDINE HYDROCHLORIDE 0.1 MG/1
0.1 TABLET ORAL 2 TIMES DAILY
COMMUNITY

## 2023-11-20 RX ORDER — OXCARBAZEPINE 150 MG/1
150 TABLET, FILM COATED ORAL 2 TIMES DAILY
COMMUNITY

## 2023-11-20 NOTE — PATIENT INSTRUCTIONS
SURVEY:    You may be receiving a survey from [employer] regarding your visit today. Please complete the survey to enable us to provide the highest quality of care to you and your family. If you cannot score us a very good on any question, please call the office to discuss how we could have made your experience a very good one. Thank you.

## 2023-11-20 NOTE — PROGRESS NOTES
Artur Castillo am scribing for and in the presence of Julius Johnson MD, F.A.C.C. Patient: Stephanie Chan  : 1985  Date of Visit: 2023    REASON FOR VISIT / CONSULTATION: Valvular Heart Disease (HX:aortic valve stenosis, Abn EKG, htn tachy PT is here for yearly follow up today he has health care worker with him they both denies any problems. Denies cp, sob, lightheaded/dizziness,palp )      History of Present Illness:        Dear Pedro Mcgregor MD    I had the pleasure of seeing your patient Stephanie Chan for his yearly follow-up. As you know, Mr. Bert Hurtado is a 41 y.o. male with profound cognitive impairment and congenital aortic valve disease is with his most recent echo showing aortic regurgitation and aortic stenosis. Also his CTA on 2009 showed no significant aortic coarctation with anomalous left subclavian artery. He is here today for follow-up. As per home health aide, there is no new symptoms. He is fairly active in the facility without significant exertional symptoms. Unfortunately, patient cannot give any meaningful history. Other medical problems include bipolar disorder, hypertension, explosive personality disorder and seizure. Echo done on 2019: EF > 65%, The left ventricular cavity size is within normal limits and the left ventricular wall thickness is mildly increased. No definite specific wall motion abnormalities were identified. The mean aortic valve gradient is 20 mmHg consistent with moderate aortic stenosis. Moderate aortic insufficiency was seen. Evidence of moderate (grade II) diastolic dysfunction is seen. EKG done on 3/9/2020: Sinus rhythm with 1st degree A-V block    EKG done on 2020: sinus tachycardia, biatrial enlargement    Echo done on 2020: EF 60%, The left ventricular cavity size is within normal limits and the left ventricular wall thickness is moderately increased.  No definite specific wall motion

## 2024-01-08 ENCOUNTER — TELEPHONE (OUTPATIENT)
Dept: CARDIOLOGY | Age: 39
End: 2024-01-08

## 2024-01-08 DIAGNOSIS — I35.0 NONRHEUMATIC AORTIC VALVE STENOSIS: Primary | ICD-10-CM

## 2024-01-08 NOTE — TELEPHONE ENCOUNTER
----- Message from Pernell Gu MD sent at 1/7/2024 12:27 AM EST -----  He needs a SERAFIN for re-evaluation of the aortic valve disease. Please get in contact with the legal guardian and I will explain the procedure to them and get consent.     If the legal Guardian is available in the area, I can explain the procedure to them in person and get a consent.     The legal Guardian also needs to give a consent for anesthesia so they should be available during the procedure or talk to the anaesthesia team to give a consent. Thank you

## 2024-01-17 ENCOUNTER — ANESTHESIA (OUTPATIENT)
Age: 39
End: 2024-01-17
Payer: MEDICARE

## 2024-01-17 ENCOUNTER — HOSPITAL ENCOUNTER (OUTPATIENT)
Age: 39
Discharge: HOME | End: 2024-01-19
Payer: MEDICARE

## 2024-01-17 ENCOUNTER — ANESTHESIA EVENT (OUTPATIENT)
Age: 39
End: 2024-01-17
Payer: MEDICARE

## 2024-01-17 ENCOUNTER — HOSPITAL ENCOUNTER (OUTPATIENT)
Age: 39
Setting detail: OUTPATIENT SURGERY
Discharge: HOME | End: 2024-01-17
Attending: INTERNAL MEDICINE | Admitting: INTERNAL MEDICINE
Payer: MEDICARE

## 2024-01-17 VITALS
RESPIRATION RATE: 23 BRPM | OXYGEN SATURATION: 96 % | DIASTOLIC BLOOD PRESSURE: 50 MMHG | TEMPERATURE: 98.2 F | HEART RATE: 85 BPM | SYSTOLIC BLOOD PRESSURE: 125 MMHG

## 2024-01-17 VITALS
WEIGHT: 227.29 LBS | SYSTOLIC BLOOD PRESSURE: 144 MMHG | HEIGHT: 64 IN | BODY MASS INDEX: 38.8 KG/M2 | DIASTOLIC BLOOD PRESSURE: 68 MMHG

## 2024-01-17 DIAGNOSIS — I35.0 AORTIC STENOSIS: ICD-10-CM

## 2024-01-17 DIAGNOSIS — Q23.0 AORTIC VALVE STENOSIS, CONGENITAL: ICD-10-CM

## 2024-01-17 PROCEDURE — 7100000010 HC PHASE II RECOVERY - FIRST 15 MIN: Performed by: INTERNAL MEDICINE

## 2024-01-17 PROCEDURE — 3700000000 HC ANESTHESIA ATTENDED CARE

## 2024-01-17 PROCEDURE — 93312 ECHO TRANSESOPHAGEAL: CPT

## 2024-01-17 PROCEDURE — 7100000011 HC PHASE II RECOVERY - ADDTL 15 MIN: Performed by: INTERNAL MEDICINE

## 2024-01-17 PROCEDURE — 6360000002 HC RX W HCPCS: Performed by: NURSE ANESTHETIST, CERTIFIED REGISTERED

## 2024-01-17 PROCEDURE — 3700000001 HC ADD 15 MINUTES (ANESTHESIA)

## 2024-01-17 PROCEDURE — 2580000003 HC RX 258: Performed by: NURSE ANESTHETIST, CERTIFIED REGISTERED

## 2024-01-17 RX ORDER — PROPOFOL 10 MG/ML
INJECTION, EMULSION INTRAVENOUS PRN
Status: DISCONTINUED | OUTPATIENT
Start: 2024-01-17 | End: 2024-01-17 | Stop reason: SDUPTHER

## 2024-01-17 RX ORDER — SODIUM CHLORIDE 9 MG/ML
INJECTION, SOLUTION INTRAVENOUS PRN
Status: DISCONTINUED | OUTPATIENT
Start: 2024-01-17 | End: 2024-01-17 | Stop reason: HOSPADM

## 2024-01-17 RX ORDER — SODIUM CHLORIDE 0.9 % (FLUSH) 0.9 %
5-40 SYRINGE (ML) INJECTION PRN
Status: CANCELLED | OUTPATIENT
Start: 2024-01-17

## 2024-01-17 RX ORDER — SODIUM CHLORIDE 0.9 % (FLUSH) 0.9 %
5-40 SYRINGE (ML) INJECTION PRN
Status: DISCONTINUED | OUTPATIENT
Start: 2024-01-17 | End: 2024-01-17 | Stop reason: HOSPADM

## 2024-01-17 RX ORDER — SODIUM CHLORIDE 9 MG/ML
INJECTION, SOLUTION INTRAVENOUS PRN
Status: CANCELLED | OUTPATIENT
Start: 2024-01-17

## 2024-01-17 RX ORDER — SODIUM CHLORIDE 0.9 % (FLUSH) 0.9 %
5-40 SYRINGE (ML) INJECTION EVERY 12 HOURS SCHEDULED
Status: DISCONTINUED | OUTPATIENT
Start: 2024-01-17 | End: 2024-01-17 | Stop reason: HOSPADM

## 2024-01-17 RX ORDER — SODIUM CHLORIDE 0.9 % (FLUSH) 0.9 %
5-40 SYRINGE (ML) INJECTION EVERY 12 HOURS SCHEDULED
Status: CANCELLED | OUTPATIENT
Start: 2024-01-17

## 2024-01-17 RX ADMIN — PROPOFOL 50 MG: 10 INJECTION, EMULSION INTRAVENOUS at 12:29

## 2024-01-17 RX ADMIN — PROPOFOL 50 MG: 10 INJECTION, EMULSION INTRAVENOUS at 12:31

## 2024-01-17 RX ADMIN — SODIUM CHLORIDE: 9 INJECTION, SOLUTION INTRAVENOUS at 12:22

## 2024-01-17 RX ADMIN — PROPOFOL 50 MG: 10 INJECTION, EMULSION INTRAVENOUS at 12:26

## 2024-01-17 RX ADMIN — PROPOFOL 50 MG: 10 INJECTION, EMULSION INTRAVENOUS at 12:24

## 2024-01-17 NOTE — PROGRESS NOTES
Dr Gu reviewed procedure results with family.  Patient in recovery.  Awake and talking to staff and family.

## 2024-01-17 NOTE — DISCHARGE INSTRUCTIONS
Sedation for a Medical Procedure: SERAFIN after-care Instructions  Your Care Instructions  For a minor procedure, you will get a sedative to help you relax. This drug will make you sleepy. It is usually given in a vein (by IV). A spray was also used to numb the throat.  Your throat may feel may feel sore.     Common side effects from sedation:  You will feel sleepy. Rest until the sedation has worn off.  Nausea and vomiting is common and usually does not last long.  Feeling tired.  Follow-up care is a key part of your treatment and safety.   Be sure to make and go to all appointments, and call your doctor if you are having problems. It's also a good idea to know your test results and keep a list of the medicines you take.  How can you care for yourself at home?  Activity  Don't do anything for 24 hours that requires attention to detail or until the effects of sedation completely wear off.  Do not drive or operate any machinery until the medicine wears off and you can think clearly and react easily.  Do not drink alcohol for 24 hours after procedure.  Rest when you feel tired. Getting enough sleep will help you recover.  Diet  Drink plenty of fluids (unless your doctor tells you not to). Also, try to eat just soft foods for next few days until throat irritation is gone.  Don't drink alcohol for 24 hours.  Medicines  You may use Cepacol lozenges or sucrets for the next few days until throat feels    better.    Be safe with medicines. Read and follow all instructions on the label.  If the doctor gave you a prescription medicine for pain, take it as prescribed.  If you are not taking a prescription pain medicine, ask your doctor if you can take an over-the-counter medicine.  If you think your pain medicine is making you sick to your stomach:  Take your medicine after meals (unless your doctor has told you not to).  Ask your doctor for a different pain medicine.  When should you call for help?  Call 911 anytime you think

## 2024-01-17 NOTE — PROGRESS NOTES
Dressed for home.  Ambulates off department accompanied by home staff, all belongings sent with patient.

## 2024-01-17 NOTE — ANESTHESIA PRE PROCEDURE
10:46 AM    PROT 6.1 09/08/2023 06:20 AM    CALCIUM 9.2 09/08/2023 06:20 AM    BILITOT 0.2 09/08/2023 06:20 AM    ALKPHOS 59 09/08/2023 06:20 AM    AST 20 09/08/2023 06:20 AM    ALT 25 09/08/2023 06:20 AM       POC Tests: No results for input(s): \"POCGLU\", \"POCNA\", \"POCK\", \"POCCL\", \"POCBUN\", \"POCHEMO\", \"POCHCT\" in the last 72 hours.    Coags: No results found for: \"PROTIME\", \"INR\", \"APTT\"    HCG (If Applicable): No results found for: \"PREGTESTUR\", \"PREGSERUM\", \"HCG\", \"HCGQUANT\"     ABGs: No results found for: \"PHART\", \"PO2ART\", \"PCX9YDA\", \"GMC9MSC\", \"BEART\", \"Y5GQTLSA\"     Type & Screen (If Applicable):  No results found for: \"LABABO\", \"LABRH\"    Drug/Infectious Status (If Applicable):  Lab Results   Component Value Date/Time    HEPCAB NONREACTIVE 04/15/2021 08:05 AM       COVID-19 Screening (If Applicable):   Lab Results   Component Value Date/Time    COVID19 Not Detected 11/10/2020 05:49 PM           Anesthesia Evaluation  Patient summary reviewed and Nursing notes reviewed   no history of anesthetic complications:   Airway: Mallampati: IV  TM distance: >3 FB   Neck ROM: full  Mouth opening: > = 3 FB   Dental: normal exam         Pulmonary:Negative Pulmonary ROS                              Cardiovascular:  Exercise tolerance: good (>4 METS)  (+) hypertension:, valvular problems/murmurs: AS and AI               ROS comment: Left Ventricle Normal left ventricular systolic function with a visually estimated EF of 55 - 60%. EF by 2D Simpsons Biplane is 58%. Left ventricle size is normal. Mildly increased wall thickness. Normal diastolic function.  Left Atrium Left atrium size is normal.  Right Ventricle Right ventricle size is normal. Normal systolic function.  Right Atrium Right atrium size is normal.  Aortic Valve Not well visualized and a bicuspid valve cannot be excluded. Thickened cusp. Sclerosis of the aortic valve cusp. Moderate regurgitation with a posterolateral eccentrically directed jet and may

## 2024-01-18 LAB
ECHO BSA: 2.16 M2
ECHO BSA: 2.16 M2

## 2024-01-18 NOTE — ANESTHESIA POSTPROCEDURE EVALUATION
Department of Anesthesiology  Postprocedure Note    Patient: Thierry Lizama  MRN: [id number]  YOB: 1985  Date of evaluation: 1/17/2024 1410    Procedure Summary       Date: 01/17/24 Room / Location: [facility] Non-Invasive Cardiology    Anesthesia Start: 1222 Anesthesia Stop: 1246    Procedure: SERAFIN (PRN CONTRAST/BUBBLE/3D) Diagnosis: Aortic stenosis    Scheduled Providers:  Responsible Provider: Jostin Huerta APRN - CRNA    Anesthesia Type: general, TIVA ASA Status: 3            Anesthesia Type: No value filed.    Hossein Phase I:      Hossein Phase II:      Anesthesia Post Evaluation    Patient location during evaluation: bedside  Patient participation: complete - patient participated  Level of consciousness: awake and alert  Pain score: 0  Airway patency: patent  Nausea & Vomiting: no nausea and no vomiting  Cardiovascular status: hemodynamically stable  Respiratory status: acceptable  Hydration status: stable  Multimodal analgesia pain management approach  Pain management: satisfactory to patient        No notable events documented.

## 2024-01-19 ENCOUNTER — TELEPHONE (OUTPATIENT)
Dept: CARDIOLOGY | Age: 39
End: 2024-01-19

## 2024-01-19 NOTE — TELEPHONE ENCOUNTER
Kelsea, Mr. Felipe Lizama mother called into the office and wanted to let Dr. Gu know that she had her daughter discussed at great length Mr. Felipe Lizama condition and at this time she does not want to proceed with any further surgeries for Mr. Felipe Lizama valve.     Thanks!

## 2024-01-24 NOTE — H&P
I, Colleen Garcia am scribing for and in the presence of Pernell Gu MD, F.A.C.C.    Patient: Thierry Lizama  : 1985  Date of Visit: 2024    REASON FOR VISIT / CONSULTATION: SERAFIN needed for evaluation of aortic valve disease.    History of Present Illness:        Dear Glory Le MD    I had the pleasure of seeing your patient Thierry Lizama for his yearly follow-up.    As you know, Mr. Felipe Lizama is a 38 y.o. male with profound cognitive impairment and congenital aortic valve disease is with his most recent echo showing aortic regurgitation and aortic stenosis.  Also his CTA on 2009 showed no significant aortic coarctation with anomalous left subclavian artery.  He is here today for follow-up.  As per home health aide, there is no new symptoms.  He is fairly active in the facility without significant exertional symptoms.  Unfortunately, patient cannot give any meaningful history.    Other medical problems include bipolar disorder, hypertension, explosive personality disorder and seizure.    Echo done on 2019: EF > 65%, The left ventricular cavity size is within normal limits and the left ventricular wall thickness is mildly increased. No definite specific wall motion abnormalities were identified. The mean aortic valve gradient is 20 mmHg consistent with moderate aortic stenosis. Moderate aortic insufficiency was seen. Evidence of moderate (grade II) diastolic dysfunction is seen.    EKG done on 3/9/2020: Sinus rhythm with 1st degree A-V block    EKG done on 2020: sinus tachycardia, biatrial enlargement    Echo done on 2020: EF 60%, The left ventricular cavity size is within normal limits and the left ventricular wall thickness is moderately increased. No definite specific wall motion abnormalities were identified. The mean aortic valve gradient is 20 mmHg consistent with moderate aortic stenosis. Moderate aortic insufficiency was seen. Evidence of mild

## 2024-02-08 LAB — ECHO BSA: 2.16 M2

## 2024-03-08 ENCOUNTER — HOSPITAL ENCOUNTER (OUTPATIENT)
Age: 39
Setting detail: SPECIMEN
Discharge: HOME | End: 2024-03-08
Payer: MEDICARE

## 2024-03-08 LAB
ALBUMIN SERPL-MCNC: 4.7 G/DL (ref 3.5–5.2)
ALBUMIN/GLOB SERPL: 2.4 {RATIO} (ref 1–2.5)
ALP SERPL-CCNC: 63 U/L (ref 40–129)
ALT SERPL-CCNC: 43 U/L (ref 5–41)
AMMONIA PLAS-SCNC: 70 UMOL/L (ref 16–60)
ANION GAP SERPL CALCULATED.3IONS-SCNC: 13 MMOL/L (ref 9–17)
AST SERPL-CCNC: 24 U/L
BASOPHILS # BLD: 0.05 K/UL (ref 0–0.2)
BASOPHILS NFR BLD: 1 % (ref 0–2)
BILIRUB SERPL-MCNC: 0.3 MG/DL (ref 0.3–1.2)
BUN SERPL-MCNC: 15 MG/DL (ref 6–20)
BUN/CREAT SERPL: 19 (ref 9–20)
CALCIUM SERPL-MCNC: 9.3 MG/DL (ref 8.6–10.4)
CHLORIDE SERPL-SCNC: 104 MMOL/L (ref 98–107)
CHOLEST SERPL-MCNC: 174 MG/DL (ref 0–199)
CHOLESTEROL/HDL RATIO: 4
CO2 SERPL-SCNC: 24 MMOL/L (ref 20–31)
CREAT SERPL-MCNC: 0.8 MG/DL (ref 0.7–1.2)
EOSINOPHIL # BLD: 0.14 K/UL (ref 0–0.44)
EOSINOPHILS RELATIVE PERCENT: 2 % (ref 1–4)
ERYTHROCYTE [DISTWIDTH] IN BLOOD BY AUTOMATED COUNT: 13.3 % (ref 11.8–14.4)
EST. AVERAGE GLUCOSE BLD GHB EST-MCNC: 97 MG/DL
GFR SERPL CREATININE-BSD FRML MDRD: >60 ML/MIN/1.73M2
GLUCOSE SERPL-MCNC: 92 MG/DL (ref 70–99)
HBA1C MFR BLD: 5 % (ref 4–6)
HCT VFR BLD AUTO: 46.1 % (ref 40.7–50.3)
HDLC SERPL-MCNC: 44 MG/DL
HGB BLD-MCNC: 15.1 G/DL (ref 13–17)
IMM GRANULOCYTES # BLD AUTO: 0.06 K/UL (ref 0–0.3)
IMM GRANULOCYTES NFR BLD: 1 %
LDLC SERPL CALC-MCNC: 87 MG/DL (ref 0–100)
LYMPHOCYTES NFR BLD: 1.81 K/UL (ref 1.1–3.7)
LYMPHOCYTES RELATIVE PERCENT: 30 % (ref 24–43)
MCH RBC QN AUTO: 28.7 PG (ref 25.2–33.5)
MCHC RBC AUTO-ENTMCNC: 32.8 G/DL (ref 28.4–34.8)
MCV RBC AUTO: 87.5 FL (ref 82.6–102.9)
MONOCYTES NFR BLD: 0.67 K/UL (ref 0.1–1.2)
MONOCYTES NFR BLD: 11 % (ref 3–12)
NEUTROPHILS NFR BLD: 55 % (ref 36–65)
NEUTS SEG NFR BLD: 3.41 K/UL (ref 1.5–8.1)
NRBC BLD-RTO: 0 PER 100 WBC
PLATELET # BLD AUTO: 162 K/UL (ref 138–453)
PMV BLD AUTO: 10.1 FL (ref 8.1–13.5)
POTASSIUM SERPL-SCNC: 4.3 MMOL/L (ref 3.7–5.3)
PROT SERPL-MCNC: 6.7 G/DL (ref 6.4–8.3)
RBC # BLD AUTO: 5.27 M/UL (ref 4.21–5.77)
SODIUM SERPL-SCNC: 141 MMOL/L (ref 135–144)
TRIGL SERPL-MCNC: 215 MG/DL
VALPROATE SERPL-MCNC: 86 UG/ML (ref 50–125)
VLDLC SERPL CALC-MCNC: 43 MG/DL
WBC OTHER # BLD: 6.1 K/UL (ref 3.5–11.3)

## 2024-03-08 PROCEDURE — 80061 LIPID PANEL: CPT

## 2024-03-08 PROCEDURE — P9604 ONE-WAY ALLOW PRORATED TRIP: HCPCS

## 2024-03-08 PROCEDURE — 36415 COLL VENOUS BLD VENIPUNCTURE: CPT

## 2024-03-08 PROCEDURE — 83036 HEMOGLOBIN GLYCOSYLATED A1C: CPT

## 2024-03-08 PROCEDURE — 80183 DRUG SCRN QUANT OXCARBAZEPIN: CPT

## 2024-03-08 PROCEDURE — 80164 ASSAY DIPROPYLACETIC ACD TOT: CPT

## 2024-03-08 PROCEDURE — 80053 COMPREHEN METABOLIC PANEL: CPT

## 2024-03-08 PROCEDURE — 82140 ASSAY OF AMMONIA: CPT

## 2024-03-08 PROCEDURE — 85025 COMPLETE CBC W/AUTO DIFF WBC: CPT

## 2024-03-10 LAB — 10OH-CARBAZEPINE SERPL-MCNC: 28 UG/ML (ref 3–35)

## 2024-03-11 ENCOUNTER — HOSPITAL ENCOUNTER (OUTPATIENT)
Age: 39
Discharge: HOME | End: 2024-03-11
Payer: MEDICARE

## 2024-03-11 PROCEDURE — 93005 ELECTROCARDIOGRAM TRACING: CPT | Performed by: INTERNAL MEDICINE

## 2024-03-12 LAB
EKG ATRIAL RATE: 92 BPM
EKG P AXIS: 54 DEGREES
EKG P-R INTERVAL: 214 MS
EKG Q-T INTERVAL: 342 MS
EKG QRS DURATION: 106 MS
EKG QTC CALCULATION (BAZETT): 422 MS
EKG R AXIS: 39 DEGREES
EKG T AXIS: -9 DEGREES
EKG VENTRICULAR RATE: 92 BPM

## 2024-03-12 PROCEDURE — 93010 ELECTROCARDIOGRAM REPORT: CPT | Performed by: INTERNAL MEDICINE

## 2024-04-12 ENCOUNTER — HOSPITAL ENCOUNTER (OUTPATIENT)
Age: 39
Setting detail: SPECIMEN
Discharge: HOME | End: 2024-04-12
Payer: MEDICARE

## 2024-04-12 LAB — AMMONIA PLAS-SCNC: 55 UMOL/L (ref 16–60)

## 2024-04-12 PROCEDURE — 36415 COLL VENOUS BLD VENIPUNCTURE: CPT

## 2024-04-12 PROCEDURE — P9604 ONE-WAY ALLOW PRORATED TRIP: HCPCS

## 2024-04-12 PROCEDURE — 82140 ASSAY OF AMMONIA: CPT

## 2024-04-16 NOTE — PATIENT INSTRUCTIONS
SURVEY:    You may be receiving a survey from [email or web address] regarding your visit today. Please complete the survey to enable us to provide the highest quality of care to you and your family. If you cannot score us a very good on any question, please call the office to discuss how we could have made your experience a very good one. Thank you. Fall with [facility]

## 2024-05-31 ENCOUNTER — HOSPITAL ENCOUNTER (OUTPATIENT)
Age: 39
Setting detail: SPECIMEN
Discharge: HOME | End: 2024-05-31
Payer: MEDICARE

## 2024-05-31 LAB
HCV AB SERPL QL IA: NONREACTIVE
HIV 1+2 AB+HIV1 P24 AG SERPL QL IA: NONREACTIVE

## 2024-05-31 PROCEDURE — 87389 HIV-1 AG W/HIV-1&-2 AB AG IA: CPT

## 2024-05-31 PROCEDURE — 36415 COLL VENOUS BLD VENIPUNCTURE: CPT

## 2024-05-31 PROCEDURE — 86803 HEPATITIS C AB TEST: CPT

## 2024-05-31 PROCEDURE — P9603 ONE-WAY ALLOW PRORATED MILES: HCPCS

## 2024-09-09 ENCOUNTER — HOSPITAL ENCOUNTER (OUTPATIENT)
Age: 39
Discharge: HOME | End: 2024-09-09
Payer: MEDICARE

## 2024-09-09 LAB
EKG ATRIAL RATE: 90 BPM
EKG P AXIS: 54 DEGREES
EKG P-R INTERVAL: 206 MS
EKG Q-T INTERVAL: 350 MS
EKG QRS DURATION: 100 MS
EKG QTC CALCULATION (BAZETT): 428 MS
EKG R AXIS: -121 DEGREES
EKG T AXIS: -4 DEGREES
EKG VENTRICULAR RATE: 90 BPM

## 2024-09-09 PROCEDURE — 93005 ELECTROCARDIOGRAM TRACING: CPT | Performed by: FAMILY MEDICINE

## 2024-09-09 PROCEDURE — 93010 ELECTROCARDIOGRAM REPORT: CPT | Performed by: INTERNAL MEDICINE

## 2024-09-13 ENCOUNTER — HOSPITAL ENCOUNTER (OUTPATIENT)
Age: 39
Setting detail: SPECIMEN
Discharge: HOME | End: 2024-09-13
Payer: MEDICARE

## 2024-09-13 LAB
ALBUMIN SERPL-MCNC: 4.1 G/DL (ref 3.5–5.2)
ALBUMIN/GLOB SERPL: 3.1 {RATIO} (ref 1–2.5)
ALP SERPL-CCNC: 59 U/L (ref 40–129)
ALT SERPL-CCNC: 48 U/L (ref 10–50)
AMMONIA PLAS-SCNC: 64 UMOL/L (ref 11–51)
ANION GAP SERPL CALCULATED.3IONS-SCNC: 11 MMOL/L (ref 9–16)
AST SERPL-CCNC: 27 U/L (ref 10–50)
BASOPHILS # BLD: 0.04 K/UL (ref 0–0.2)
BASOPHILS NFR BLD: 1 % (ref 0–2)
BILIRUB SERPL-MCNC: 0.3 MG/DL (ref 0–1.2)
BUN SERPL-MCNC: 14 MG/DL (ref 6–20)
BUN/CREAT SERPL: 18 (ref 9–20)
CALCIUM SERPL-MCNC: 9.1 MG/DL (ref 8.6–10.4)
CHLORIDE SERPL-SCNC: 102 MMOL/L (ref 98–107)
CO2 SERPL-SCNC: 27 MMOL/L (ref 20–31)
CREAT SERPL-MCNC: 0.8 MG/DL (ref 0.7–1.2)
EOSINOPHIL # BLD: 0.15 K/UL (ref 0–0.44)
EOSINOPHILS RELATIVE PERCENT: 3 % (ref 1–4)
ERYTHROCYTE [DISTWIDTH] IN BLOOD BY AUTOMATED COUNT: 13.9 % (ref 11.8–14.4)
EST. AVERAGE GLUCOSE BLD GHB EST-MCNC: 100 MG/DL
GFR, ESTIMATED: >90 ML/MIN/1.73M2
GLUCOSE SERPL-MCNC: 93 MG/DL (ref 74–99)
HBA1C MFR BLD: 5.1 % (ref 4–6)
HCT VFR BLD AUTO: 42.4 % (ref 40.7–50.3)
HGB BLD-MCNC: 14.3 G/DL (ref 13–17)
IMM GRANULOCYTES # BLD AUTO: 0.04 K/UL (ref 0–0.3)
IMM GRANULOCYTES NFR BLD: 1 %
LYMPHOCYTES NFR BLD: 1.95 K/UL (ref 1.1–3.7)
LYMPHOCYTES RELATIVE PERCENT: 32 % (ref 24–43)
MCH RBC QN AUTO: 29.2 PG (ref 25.2–33.5)
MCHC RBC AUTO-ENTMCNC: 33.7 G/DL (ref 28.4–34.8)
MCV RBC AUTO: 86.7 FL (ref 82.6–102.9)
MONOCYTES NFR BLD: 0.64 K/UL (ref 0.1–1.2)
MONOCYTES NFR BLD: 11 % (ref 3–12)
NEUTROPHILS NFR BLD: 52 % (ref 36–65)
NEUTS SEG NFR BLD: 3.21 K/UL (ref 1.5–8.1)
NRBC BLD-RTO: 0 PER 100 WBC
PLATELET # BLD AUTO: 144 K/UL (ref 138–453)
PMV BLD AUTO: 10.1 FL (ref 8.1–13.5)
POTASSIUM SERPL-SCNC: 4.1 MMOL/L (ref 3.7–5.3)
PROT SERPL-MCNC: 5.5 G/DL (ref 6.6–8.7)
RBC # BLD AUTO: 4.89 M/UL (ref 4.21–5.77)
SODIUM SERPL-SCNC: 140 MMOL/L (ref 136–145)
VALPROATE SERPL-MCNC: 75 UG/ML (ref 50–125)
WBC OTHER # BLD: 6 K/UL (ref 3.5–11.3)

## 2024-09-13 PROCEDURE — 83036 HEMOGLOBIN GLYCOSYLATED A1C: CPT

## 2024-09-13 PROCEDURE — 80183 DRUG SCRN QUANT OXCARBAZEPIN: CPT

## 2024-09-13 PROCEDURE — 85025 COMPLETE CBC W/AUTO DIFF WBC: CPT

## 2024-09-13 PROCEDURE — 80164 ASSAY DIPROPYLACETIC ACD TOT: CPT

## 2024-09-13 PROCEDURE — 80053 COMPREHEN METABOLIC PANEL: CPT

## 2024-09-13 PROCEDURE — 82140 ASSAY OF AMMONIA: CPT

## 2024-09-13 PROCEDURE — 36415 COLL VENOUS BLD VENIPUNCTURE: CPT

## 2024-09-16 LAB — 10OH-CARBAZEPINE SERPL-MCNC: 27 UG/ML (ref 3–35)

## 2024-11-20 ENCOUNTER — OFFICE VISIT (OUTPATIENT)
Dept: CARDIOLOGY | Age: 39
End: 2024-11-20
Payer: MEDICARE

## 2024-11-20 VITALS
DIASTOLIC BLOOD PRESSURE: 68 MMHG | SYSTOLIC BLOOD PRESSURE: 111 MMHG | WEIGHT: 226.8 LBS | HEART RATE: 76 BPM | RESPIRATION RATE: 18 BRPM | BODY MASS INDEX: 38.72 KG/M2 | HEIGHT: 64 IN

## 2024-11-20 DIAGNOSIS — I10 ESSENTIAL HYPERTENSION: ICD-10-CM

## 2024-11-20 DIAGNOSIS — Q24.9 CONGENITAL HEART DISEASE: ICD-10-CM

## 2024-11-20 DIAGNOSIS — R94.31 ABNORMAL EKG: ICD-10-CM

## 2024-11-20 DIAGNOSIS — I35.9 AORTIC VALVE DISEASE: Primary | ICD-10-CM

## 2024-11-20 PROCEDURE — 1036F TOBACCO NON-USER: CPT | Performed by: INTERNAL MEDICINE

## 2024-11-20 PROCEDURE — G8427 DOCREV CUR MEDS BY ELIG CLIN: HCPCS | Performed by: INTERNAL MEDICINE

## 2024-11-20 PROCEDURE — 99214 OFFICE O/P EST MOD 30 MIN: CPT | Performed by: INTERNAL MEDICINE

## 2024-11-20 PROCEDURE — 3074F SYST BP LT 130 MM HG: CPT | Performed by: INTERNAL MEDICINE

## 2024-11-20 PROCEDURE — 3078F DIAST BP <80 MM HG: CPT | Performed by: INTERNAL MEDICINE

## 2024-11-20 PROCEDURE — G8417 CALC BMI ABV UP PARAM F/U: HCPCS | Performed by: INTERNAL MEDICINE

## 2024-11-20 PROCEDURE — G8484 FLU IMMUNIZE NO ADMIN: HCPCS | Performed by: INTERNAL MEDICINE

## 2024-11-20 NOTE — PROGRESS NOTES
mmHg consistent with moderate aortic stenosis. Moderate aortic insufficiency was seen. Evidence of mild (grade I) diastolic dysfunction is seen.    I reviewed his EKG 9/2021 and it showed sinus rhythm with no change from prior.     Echo done on 10/5/2021- EF 60%  The left ventricular cavity size is mildly enlarged and the left ventricular wall thickness is mildly increased. No definite specific wall motion abnormalities were identified. The left atrium is moderately dilated (34-39) with a left atrial volume index of 35 ml/m2. Aortic leaflet calcification with mild aortic stenosis with a mean gradient of 19 mmHg. Moderate to severe aortic regurgitation was seen.    Echo done on 2/23/2022- ejection fraction of 55%. The left ventricular cavity size is mildly enlarged and the left ventricular wall thickness is mildly increased. The mean aortic valve gradient is 19 mmHg consistent with mild to moderate aortic stenosis. At least moderate aortic insufficiency was seen. Evidence of mild (grade I) diastolic dysfunction is seen.    SERAFIN on 11/18/2024: Thickened and redundant trileaflet aortic valve with fixed noncoronary cusp and severe aortic regurgitation. The aortic root is normal, the proximal ascending aorta is measuring 3.6 cm. Ejection fraction is preserved. Discussed with his mother the need for evaluation of aortic valve intervention but giving his age and the fact that he has mental disability, his mother and his sister said they will think about it and call us back. I told them if this has to be done we must send him to either [facility] or [facility].     Mr. Felipe Lizama is here today for follow-up.  According to his home health aide, he is doing okay.  He just gets tired by usually late afternoon and requires a nap.  No falls, near falls or history of loss of consciousness.  His appetite is good.  No further history can be obtained because of the severe MRDD.    PAST MEDICAL HISTORY:

## 2024-12-04 ENCOUNTER — HOSPITAL ENCOUNTER (OUTPATIENT)
Age: 39
Discharge: HOME | End: 2024-12-06
Payer: MEDICARE

## 2024-12-04 VITALS
WEIGHT: 226.85 LBS | HEIGHT: 64 IN | SYSTOLIC BLOOD PRESSURE: 111 MMHG | BODY MASS INDEX: 38.73 KG/M2 | DIASTOLIC BLOOD PRESSURE: 68 MMHG

## 2024-12-04 DIAGNOSIS — I10 ESSENTIAL HYPERTENSION: ICD-10-CM

## 2024-12-04 DIAGNOSIS — Q24.9 CONGENITAL HEART DISEASE: ICD-10-CM

## 2024-12-04 DIAGNOSIS — I35.9 AORTIC VALVE DISEASE: ICD-10-CM

## 2024-12-04 DIAGNOSIS — R94.31 ABNORMAL EKG: ICD-10-CM

## 2024-12-04 LAB
ECHO AO SINUS VALSALVA DIAM: 2.9 CM
ECHO AO SINUS VALSALVA INDEX: 1.41 CM/M2
ECHO AO ST JNCT DIAM: 2.3 CM
ECHO AR MAX VEL PISA: 4 M/S
ECHO AV AREA PEAK VELOCITY: 2.3 CM2
ECHO AV AREA VTI: 2.4 CM2
ECHO AV AREA/BSA PEAK VELOCITY: 1.1 CM2/M2
ECHO AV AREA/BSA VTI: 1.2 CM2/M2
ECHO AV CUSP MM: 2.1 CM
ECHO AV MEAN GRADIENT: 18 MMHG
ECHO AV MEAN VELOCITY: 2 M/S
ECHO AV PEAK GRADIENT: 32 MMHG
ECHO AV PEAK VELOCITY: 2.9 M/S
ECHO AV REGURGITANT PHT: 464 MS
ECHO AV VELOCITY RATIO: 0.48
ECHO AV VTI: 58.6 CM
ECHO BSA: 2.15 M2
ECHO LA AREA 2C: 21 CM2
ECHO LA AREA 4C: 17.4 CM2
ECHO LA MAJOR AXIS: 5.5 CM
ECHO LA MINOR AXIS: 5.6 CM
ECHO LA VOL BP: 54 ML (ref 18–58)
ECHO LA VOL MOD A2C: 67 ML (ref 18–58)
ECHO LA VOL MOD A4C: 44 ML (ref 18–58)
ECHO LA VOL/BSA BIPLANE: 26 ML/M2 (ref 16–34)
ECHO LA VOLUME INDEX MOD A2C: 33 ML/M2 (ref 16–34)
ECHO LA VOLUME INDEX MOD A4C: 21 ML/M2 (ref 16–34)
ECHO LV E' LATERAL VELOCITY: 10.1 CM/S
ECHO LV EDV A2C: 89 ML
ECHO LV EDV A4C: 153 ML
ECHO LV EDV INDEX A4C: 74 ML/M2
ECHO LV EDV NDEX A2C: 43 ML/M2
ECHO LV EJECTION FRACTION A2C: 56 %
ECHO LV EJECTION FRACTION A4C: 54 %
ECHO LV EJECTION FRACTION BIPLANE: 55 % (ref 55–100)
ECHO LV ESV A2C: 40 ML
ECHO LV ESV A4C: 70 ML
ECHO LV ESV INDEX A2C: 19 ML/M2
ECHO LV ESV INDEX A4C: 34 ML/M2
ECHO LV FRACTIONAL SHORTENING: 31 % (ref 28–44)
ECHO LV INTERNAL DIMENSION DIASTOLE INDEX: 3.01 CM/M2
ECHO LV INTERNAL DIMENSION DIASTOLIC: 6.2 CM (ref 4.2–5.9)
ECHO LV INTERNAL DIMENSION SYSTOLIC INDEX: 2.09 CM/M2
ECHO LV INTERNAL DIMENSION SYSTOLIC: 4.3 CM
ECHO LV IVSD: 0.9 CM (ref 0.6–1)
ECHO LV MASS 2D: 244.5 G (ref 88–224)
ECHO LV MASS INDEX 2D: 118.7 G/M2 (ref 49–115)
ECHO LV POSTERIOR WALL DIASTOLIC: 1 CM (ref 0.6–1)
ECHO LV RELATIVE WALL THICKNESS RATIO: 0.32
ECHO LVOT AREA: 4.9 CM2
ECHO LVOT AV VTI INDEX: 0.48
ECHO LVOT DIAM: 2.5 CM
ECHO LVOT MEAN GRADIENT: 4 MMHG
ECHO LVOT PEAK GRADIENT: 7 MMHG
ECHO LVOT PEAK VELOCITY: 1.4 M/S
ECHO LVOT STROKE VOLUME INDEX: 67.4 ML/M2
ECHO LVOT SV: 138.8 ML
ECHO LVOT VTI: 28.3 CM
ECHO MV A VELOCITY: 0.66 M/S
ECHO MV E DECELERATION TIME (DT): 164 MS
ECHO MV E VELOCITY: 0.85 M/S
ECHO MV E/A RATIO: 1.29
ECHO MV E/E' LATERAL: 8.42
ECHO PV MAX VELOCITY: 1 M/S
ECHO PV PEAK GRADIENT: 4 MMHG

## 2024-12-04 PROCEDURE — 93306 TTE W/DOPPLER COMPLETE: CPT | Performed by: INTERNAL MEDICINE

## 2024-12-04 PROCEDURE — 93306 TTE W/DOPPLER COMPLETE: CPT

## 2024-12-05 ENCOUNTER — TELEPHONE (OUTPATIENT)
Dept: CARDIOLOGY | Age: 39
End: 2024-12-05

## 2024-12-05 NOTE — TELEPHONE ENCOUNTER
----- Message from Dr. Pernell Gu MD sent at 12/4/2024 10:40 PM EST -----  Echo didn't change from before.

## 2025-02-16 ENCOUNTER — APPOINTMENT (OUTPATIENT)
Dept: CT IMAGING | Age: 40
DRG: 195 | End: 2025-02-16
Payer: MEDICARE

## 2025-02-16 ENCOUNTER — HOSPITAL ENCOUNTER (EMERGENCY)
Age: 40
Discharge: HOME | DRG: 195 | End: 2025-02-16
Attending: EMERGENCY MEDICINE
Payer: MEDICARE

## 2025-02-16 ENCOUNTER — APPOINTMENT (OUTPATIENT)
Dept: GENERAL RADIOLOGY | Age: 40
DRG: 195 | End: 2025-02-16
Payer: MEDICARE

## 2025-02-16 VITALS
WEIGHT: 228 LBS | TEMPERATURE: 98 F | HEART RATE: 86 BPM | DIASTOLIC BLOOD PRESSURE: 56 MMHG | HEIGHT: 64 IN | BODY MASS INDEX: 38.93 KG/M2 | RESPIRATION RATE: 20 BRPM | SYSTOLIC BLOOD PRESSURE: 133 MMHG | OXYGEN SATURATION: 91 %

## 2025-02-16 DIAGNOSIS — R11.0 NAUSEA: ICD-10-CM

## 2025-02-16 DIAGNOSIS — J40 BRONCHITIS: Primary | ICD-10-CM

## 2025-02-16 DIAGNOSIS — N30.00 ACUTE CYSTITIS WITHOUT HEMATURIA: ICD-10-CM

## 2025-02-16 DIAGNOSIS — S09.90XA MINOR HEAD INJURY, INITIAL ENCOUNTER: ICD-10-CM

## 2025-02-16 DIAGNOSIS — R42 DIZZINESS: ICD-10-CM

## 2025-02-16 LAB
BACTERIA URNS QL MICRO: ABNORMAL
BILIRUB UR QL STRIP: ABNORMAL
CASTS #/AREA URNS LPF: ABNORMAL /LPF
CASTS #/AREA URNS LPF: ABNORMAL /LPF
CLARITY UR: ABNORMAL
COLOR UR: ABNORMAL
EPI CELLS #/AREA URNS HPF: ABNORMAL /HPF (ref 0–5)
FLUAV AG SPEC QL: NEGATIVE
FLUBV AG SPEC QL: NEGATIVE
GLUCOSE UR STRIP-MCNC: ABNORMAL MG/DL
HGB UR QL STRIP.AUTO: NEGATIVE
KETONES UR STRIP-MCNC: ABNORMAL MG/DL
LEUKOCYTE ESTERASE UR QL STRIP: NEGATIVE
MUCOUS THREADS URNS QL MICRO: ABNORMAL
NITRITE UR QL STRIP: NEGATIVE
PH UR STRIP: 5.5 [PH] (ref 5–9)
PROT UR STRIP-MCNC: ABNORMAL MG/DL
RBC #/AREA URNS HPF: ABNORMAL /HPF (ref 0–2)
RENAL EPITHELIAL, UA: ABNORMAL /HPF
SARS-COV-2 RDRP RESP QL NAA+PROBE: NOT DETECTED
SP GR UR STRIP: >1.03 (ref 1.01–1.02)
SPECIMEN DESCRIPTION: NORMAL
UROBILINOGEN UR STRIP-ACNC: NORMAL EU/DL (ref 0–1)
WBC #/AREA URNS HPF: ABNORMAL /HPF (ref 0–5)

## 2025-02-16 PROCEDURE — 87635 SARS-COV-2 COVID-19 AMP PRB: CPT

## 2025-02-16 PROCEDURE — 6370000000 HC RX 637 (ALT 250 FOR IP): Performed by: STUDENT IN AN ORGANIZED HEALTH CARE EDUCATION/TRAINING PROGRAM

## 2025-02-16 PROCEDURE — 70450 CT HEAD/BRAIN W/O DYE: CPT

## 2025-02-16 PROCEDURE — 87086 URINE CULTURE/COLONY COUNT: CPT

## 2025-02-16 PROCEDURE — 0202U NFCT DS 22 TRGT SARS-COV-2: CPT

## 2025-02-16 PROCEDURE — 87804 INFLUENZA ASSAY W/OPTIC: CPT

## 2025-02-16 PROCEDURE — 94664 DEMO&/EVAL PT USE INHALER: CPT

## 2025-02-16 PROCEDURE — 6370000000 HC RX 637 (ALT 250 FOR IP): Performed by: EMERGENCY MEDICINE

## 2025-02-16 PROCEDURE — 81001 URINALYSIS AUTO W/SCOPE: CPT

## 2025-02-16 PROCEDURE — 71045 X-RAY EXAM CHEST 1 VIEW: CPT

## 2025-02-16 RX ORDER — IPRATROPIUM BROMIDE AND ALBUTEROL SULFATE 2.5; .5 MG/3ML; MG/3ML
1 SOLUTION RESPIRATORY (INHALATION) ONCE
Status: COMPLETED | OUTPATIENT
Start: 2025-02-16 | End: 2025-02-16

## 2025-02-16 RX ORDER — ONDANSETRON 4 MG/1
4 TABLET, ORALLY DISINTEGRATING ORAL 3 TIMES DAILY PRN
Qty: 10 TABLET | Refills: 0 | Status: SHIPPED
Start: 2025-02-16 | End: 2025-02-16

## 2025-02-16 RX ORDER — ALBUTEROL SULFATE 0.83 MG/ML
2.5 SOLUTION RESPIRATORY (INHALATION) EVERY 6 HOURS PRN
Qty: 25 EACH | Refills: 3 | Status: SHIPPED
Start: 2025-02-16 | End: 2025-02-16

## 2025-02-16 RX ORDER — MECLIZINE HYDROCHLORIDE 25 MG/1
25 TABLET ORAL 3 TIMES DAILY PRN
Qty: 15 TABLET | Refills: 0 | Status: SHIPPED | OUTPATIENT
Start: 2025-02-16 | End: 2025-02-26

## 2025-02-16 RX ORDER — ALBUTEROL SULFATE 0.83 MG/ML
7.5 SOLUTION RESPIRATORY (INHALATION) EVERY 6 HOURS PRN
Status: DISCONTINUED | OUTPATIENT
Start: 2025-02-16 | End: 2025-02-16 | Stop reason: HOSPADM

## 2025-02-16 RX ORDER — ONDANSETRON 4 MG/1
4 TABLET, ORALLY DISINTEGRATING ORAL ONCE
Status: COMPLETED | OUTPATIENT
Start: 2025-02-16 | End: 2025-02-16

## 2025-02-16 RX ORDER — CEPHALEXIN 500 MG/1
500 CAPSULE ORAL 4 TIMES DAILY
Qty: 28 CAPSULE | Refills: 0 | Status: SHIPPED
Start: 2025-02-16 | End: 2025-02-16

## 2025-02-16 RX ORDER — ONDANSETRON 2 MG/ML
4 INJECTION INTRAMUSCULAR; INTRAVENOUS ONCE
Status: DISCONTINUED | OUTPATIENT
Start: 2025-02-16 | End: 2025-02-16

## 2025-02-16 RX ORDER — MECLIZINE HYDROCHLORIDE 25 MG/1
25 TABLET ORAL 3 TIMES DAILY PRN
Qty: 15 TABLET | Refills: 0 | Status: SHIPPED
Start: 2025-02-16 | End: 2025-02-16

## 2025-02-16 RX ORDER — CEPHALEXIN 500 MG/1
500 CAPSULE ORAL 4 TIMES DAILY
Qty: 28 CAPSULE | Refills: 0 | Status: ON HOLD | OUTPATIENT
Start: 2025-02-16 | End: 2025-02-20 | Stop reason: HOSPADM

## 2025-02-16 RX ORDER — ALBUTEROL SULFATE 0.83 MG/ML
2.5 SOLUTION RESPIRATORY (INHALATION) EVERY 6 HOURS PRN
Qty: 25 EACH | Refills: 3 | Status: SHIPPED | OUTPATIENT
Start: 2025-02-16

## 2025-02-16 RX ORDER — ONDANSETRON 4 MG/1
4 TABLET, ORALLY DISINTEGRATING ORAL 3 TIMES DAILY PRN
Qty: 10 TABLET | Refills: 0 | Status: SHIPPED | OUTPATIENT
Start: 2025-02-16

## 2025-02-16 RX ORDER — CEPHALEXIN 500 MG/1
500 CAPSULE ORAL ONCE
Status: COMPLETED | OUTPATIENT
Start: 2025-02-16 | End: 2025-02-16

## 2025-02-16 RX ORDER — MECLIZINE HCL 12.5 MG 12.5 MG/1
25 TABLET ORAL ONCE
Status: COMPLETED | OUTPATIENT
Start: 2025-02-16 | End: 2025-02-16

## 2025-02-16 RX ADMIN — MECLIZINE 25 MG: 12.5 TABLET ORAL at 20:15

## 2025-02-16 RX ADMIN — CEPHALEXIN 500 MG: 500 CAPSULE ORAL at 20:15

## 2025-02-16 RX ADMIN — ONDANSETRON 4 MG: 4 TABLET, ORALLY DISINTEGRATING ORAL at 18:54

## 2025-02-16 RX ADMIN — IPRATROPIUM BROMIDE AND ALBUTEROL SULFATE 1 DOSE: 2.5; .5 SOLUTION RESPIRATORY (INHALATION) at 19:05

## 2025-02-16 ASSESSMENT — PAIN DESCRIPTION - LOCATION: LOCATION: ARM

## 2025-02-16 ASSESSMENT — PAIN - FUNCTIONAL ASSESSMENT: PAIN_FUNCTIONAL_ASSESSMENT: 0-10

## 2025-02-16 ASSESSMENT — PAIN DESCRIPTION - ORIENTATION: ORIENTATION: RIGHT

## 2025-02-16 ASSESSMENT — PAIN DESCRIPTION - PAIN TYPE: TYPE: ACUTE PAIN

## 2025-02-16 NOTE — ED PROVIDER NOTES
[facility] EMERGENCY DEPARTMENT  EMERGENCY DEPARTMENT ENCOUNTER      Pt Name: Thierry Lizama  MRN: [id number]  Birthdate 1985  Date of evaluation: 2/16/2025  Provider: Stephanie Masters MD    CHIEF COMPLAINT       Chief Complaint   Patient presents with    Fall    Gait Problem    Dizziness    Vomiting    Altered Mental Status     Pt to ED from [facility] with c/o emesis, unsteady gait, dizziness,   altered mental status, and unwitnessed. Onset of symptoms x14 hours ago.  Per report, pt has had a respiratory illness since 2/12/25.  Pt had an unwitnessed fall today at approx. 2am.  Pt's gait is unsteady. Pt has been demonstrating unusual behaviors.  Also noted pt is lethargic and sluggish.  Pt received Tylenol and Tussin at 815 am. Pt received Motrin at 1215 pm.         HISTORY OF PRESENT ILLNESS   (Location/Symptom, Timing/Onset, Context/Setting, Quality, Duration, Modifying Factors, Severity)  Note limiting factors.   Thierry Lizama is a 39 y.o. male who presents to the emergency department      39-year-old male brought to the emergency department from [facility] for evaluation of seeming confused and less active than normal.  Patient had an unwitnessed fall overnight.  He has not any obvious injuries and is not seem to be acting as if he is in pain.  However he does not seem as active as usual today he has had a cough and has had some vomiting.  He developed an episode of vomiting in the emergency department.  Patient had a telemetry visit with his primary care provider at the facility today who called us to let us know he was coming in.  He was more concerned about a viral syndrome per our discussion.  Patient is awake and at baseline on arrival.  He is nontoxic-appearing.  He is resting comfortably appears to be in no acute distress        Nursing Notes were reviewed.    REVIEW OF SYSTEMS    (2-9 systems for level 4, 10 or more for level 5)     Review of Systems   All other systems reviewed and are

## 2025-02-16 NOTE — DISCHARGE INSTRUCTIONS
Continue current medications as prescribed.  Zofran as needed for nausea or vomiting.  Use albuterol every 4-6 hours as needed for cough wheezing or bronchospasms.  Follow-up with primary care provider in 2 to 3 days for recheck. Take meclizine for dizziness as needed.     Your urine today suggest that he might have a urinary tract infection.  I started you on some antibiotics     Return to the emergency department if you are getting worse

## 2025-02-17 ENCOUNTER — APPOINTMENT (OUTPATIENT)
Dept: GENERAL RADIOLOGY | Age: 40
DRG: 195 | End: 2025-02-17
Payer: MEDICARE

## 2025-02-17 ENCOUNTER — HOSPITAL ENCOUNTER (INPATIENT)
Age: 40
LOS: 1 days | Discharge: HOME | DRG: 195 | End: 2025-02-20
Attending: EMERGENCY MEDICINE | Admitting: STUDENT IN AN ORGANIZED HEALTH CARE EDUCATION/TRAINING PROGRAM
Payer: MEDICARE

## 2025-02-17 DIAGNOSIS — E86.0 DEHYDRATION: Primary | ICD-10-CM

## 2025-02-17 DIAGNOSIS — N39.0 URINARY TRACT INFECTION WITHOUT HEMATURIA, SITE UNSPECIFIED: ICD-10-CM

## 2025-02-17 DIAGNOSIS — B34.9 VIRAL SYNDROME: ICD-10-CM

## 2025-02-17 DIAGNOSIS — J10.1 INFLUENZA A: ICD-10-CM

## 2025-02-17 LAB
ALBUMIN SERPL-MCNC: 4.1 G/DL (ref 3.5–5.2)
ALBUMIN/GLOB SERPL: 1.6 {RATIO} (ref 1–2.5)
ALP SERPL-CCNC: 54 U/L (ref 40–129)
ALT SERPL-CCNC: 38 U/L (ref 10–50)
ANION GAP SERPL CALCULATED.3IONS-SCNC: 12 MMOL/L (ref 9–16)
AST SERPL-CCNC: 46 U/L (ref 10–50)
ATYPICAL LYMPHOCYTE ABSOLUTE COUNT: 0.13 K/UL
ATYPICAL LYMPHOCYTES: 2 %
BASOPHILS # BLD: 0 K/UL (ref 0–0.2)
BASOPHILS NFR BLD: 0 % (ref 0–2)
BILIRUB SERPL-MCNC: 0.3 MG/DL (ref 0–1.2)
BUN SERPL-MCNC: 36 MG/DL (ref 6–20)
BUN/CREAT SERPL: 36 (ref 9–20)
CALCIUM SERPL-MCNC: 9.2 MG/DL (ref 8.6–10.4)
CHLORIDE SERPL-SCNC: 104 MMOL/L (ref 98–107)
CO2 SERPL-SCNC: 28 MMOL/L (ref 20–31)
CREAT SERPL-MCNC: 1 MG/DL (ref 0.7–1.2)
CRP SERPL HS-MCNC: 87.9 MG/L (ref 0–5)
EOSINOPHIL # BLD: 0 K/UL (ref 0–0.44)
EOSINOPHILS RELATIVE PERCENT: 0 % (ref 1–4)
ERYTHROCYTE [DISTWIDTH] IN BLOOD BY AUTOMATED COUNT: 14.6 % (ref 11.8–14.4)
GFR, ESTIMATED: >90 ML/MIN/1.73M2
GLUCOSE SERPL-MCNC: 116 MG/DL (ref 74–99)
HCT VFR BLD AUTO: 42 % (ref 40.7–50.3)
HGB BLD-MCNC: 13.6 G/DL (ref 13–17)
IMM GRANULOCYTES # BLD AUTO: 0.13 K/UL (ref 0–0.3)
IMM GRANULOCYTES NFR BLD: 2 %
LACTATE BLDV-SCNC: 1 MMOL/L (ref 0.5–2.2)
LYMPHOCYTES NFR BLD: 1.22 K/UL (ref 1.1–3.7)
LYMPHOCYTES RELATIVE PERCENT: 19 % (ref 24–43)
MCH RBC QN AUTO: 28.2 PG (ref 25.2–33.5)
MCHC RBC AUTO-ENTMCNC: 32.4 G/DL (ref 28.4–34.8)
MCV RBC AUTO: 87 FL (ref 82.6–102.9)
MONOCYTES NFR BLD: 0.64 K/UL (ref 0.1–1.2)
MONOCYTES NFR BLD: 10 % (ref 3–12)
MORPHOLOGY: NORMAL
NEUTROPHILS NFR BLD: 67 % (ref 36–65)
NEUTS SEG NFR BLD: 4.28 K/UL (ref 1.5–8.1)
NRBC BLD-RTO: 0 PER 100 WBC
PLATELET # BLD AUTO: ABNORMAL K/UL (ref 138–453)
PLATELET, FLUORESCENCE: 129 K/UL (ref 138–453)
PLATELETS.RETICULATED NFR BLD AUTO: 3 % (ref 1.1–10.3)
POTASSIUM SERPL-SCNC: 3.8 MMOL/L (ref 3.7–5.3)
PROCALCITONIN SERPL-MCNC: 0.27 NG/ML (ref 0–0.09)
PROT SERPL-MCNC: 6.6 G/DL (ref 6.6–8.7)
RBC # BLD AUTO: 4.83 M/UL (ref 4.21–5.77)
SODIUM SERPL-SCNC: 144 MMOL/L (ref 136–145)
WBC OTHER # BLD: 6.4 K/UL (ref 3.5–11.3)

## 2025-02-17 PROCEDURE — 6360000002 HC RX W HCPCS: Performed by: NURSE PRACTITIONER

## 2025-02-17 PROCEDURE — 84145 PROCALCITONIN (PCT): CPT

## 2025-02-17 PROCEDURE — 94640 AIRWAY INHALATION TREATMENT: CPT

## 2025-02-17 PROCEDURE — 94761 N-INVAS EAR/PLS OXIMETRY MLT: CPT

## 2025-02-17 PROCEDURE — 85025 COMPLETE CBC W/AUTO DIFF WBC: CPT

## 2025-02-17 PROCEDURE — G0378 HOSPITAL OBSERVATION PER HR: HCPCS

## 2025-02-17 PROCEDURE — 80053 COMPREHEN METABOLIC PANEL: CPT

## 2025-02-17 PROCEDURE — 6370000000 HC RX 637 (ALT 250 FOR IP): Performed by: EMERGENCY MEDICINE

## 2025-02-17 PROCEDURE — 97166 OT EVAL MOD COMPLEX 45 MIN: CPT

## 2025-02-17 PROCEDURE — 6370000000 HC RX 637 (ALT 250 FOR IP): Performed by: NURSE PRACTITIONER

## 2025-02-17 PROCEDURE — 71045 X-RAY EXAM CHEST 1 VIEW: CPT

## 2025-02-17 PROCEDURE — 73660 X-RAY EXAM OF TOE(S): CPT

## 2025-02-17 PROCEDURE — 83605 ASSAY OF LACTIC ACID: CPT

## 2025-02-17 PROCEDURE — 87040 BLOOD CULTURE FOR BACTERIA: CPT

## 2025-02-17 PROCEDURE — 96374 THER/PROPH/DIAG INJ IV PUSH: CPT

## 2025-02-17 PROCEDURE — 6360000002 HC RX W HCPCS: Performed by: EMERGENCY MEDICINE

## 2025-02-17 PROCEDURE — 94664 DEMO&/EVAL PT USE INHALER: CPT

## 2025-02-17 PROCEDURE — 2580000003 HC RX 258: Performed by: EMERGENCY MEDICINE

## 2025-02-17 PROCEDURE — 99285 EMERGENCY DEPT VISIT HI MDM: CPT

## 2025-02-17 PROCEDURE — 2580000003 HC RX 258: Performed by: NURSE PRACTITIONER

## 2025-02-17 PROCEDURE — 96361 HYDRATE IV INFUSION ADD-ON: CPT

## 2025-02-17 PROCEDURE — 86140 C-REACTIVE PROTEIN: CPT

## 2025-02-17 PROCEDURE — 97162 PT EVAL MOD COMPLEX 30 MIN: CPT

## 2025-02-17 PROCEDURE — 93005 ELECTROCARDIOGRAM TRACING: CPT | Performed by: EMERGENCY MEDICINE

## 2025-02-17 RX ORDER — ACETAMINOPHEN 325 MG/1
650 TABLET ORAL EVERY 6 HOURS PRN
Status: DISCONTINUED | OUTPATIENT
Start: 2025-02-17 | End: 2025-02-20 | Stop reason: HOSPADM

## 2025-02-17 RX ORDER — IBUPROFEN 600 MG/1
600 TABLET, FILM COATED ORAL EVERY 6 HOURS PRN
Status: DISCONTINUED | OUTPATIENT
Start: 2025-02-17 | End: 2025-02-20 | Stop reason: HOSPADM

## 2025-02-17 RX ORDER — GUAIFENESIN/DEXTROMETHORPHAN 100-10MG/5
5 SYRUP ORAL EVERY 4 HOURS PRN
Status: DISCONTINUED | OUTPATIENT
Start: 2025-02-17 | End: 2025-02-20 | Stop reason: HOSPADM

## 2025-02-17 RX ORDER — PROMETHAZINE HYDROCHLORIDE 25 MG/1
25 SUPPOSITORY RECTAL EVERY 6 HOURS PRN
COMMUNITY

## 2025-02-17 RX ORDER — ONDANSETRON 2 MG/ML
4 INJECTION INTRAMUSCULAR; INTRAVENOUS ONCE
Status: COMPLETED | OUTPATIENT
Start: 2025-02-17 | End: 2025-02-17

## 2025-02-17 RX ORDER — ENALAPRIL MALEATE 10 MG/1
20 TABLET ORAL DAILY
Status: DISCONTINUED | OUTPATIENT
Start: 2025-02-18 | End: 2025-02-20 | Stop reason: HOSPADM

## 2025-02-17 RX ORDER — POTASSIUM CHLORIDE 1500 MG/1
40 TABLET, EXTENDED RELEASE ORAL PRN
Status: DISCONTINUED | OUTPATIENT
Start: 2025-02-17 | End: 2025-02-20

## 2025-02-17 RX ORDER — PROMETHAZINE HYDROCHLORIDE 25 MG/1
25 TABLET ORAL EVERY 6 HOURS PRN
COMMUNITY

## 2025-02-17 RX ORDER — PETROLATUM,WHITE
OINTMENT IN PACKET (GRAM) TOPICAL 2 TIMES DAILY
Status: DISCONTINUED | OUTPATIENT
Start: 2025-02-17 | End: 2025-02-17 | Stop reason: CLARIF

## 2025-02-17 RX ORDER — FAMOTIDINE 20 MG/1
20 TABLET, FILM COATED ORAL 2 TIMES DAILY
Status: DISCONTINUED | OUTPATIENT
Start: 2025-02-17 | End: 2025-02-20 | Stop reason: HOSPADM

## 2025-02-17 RX ORDER — OXCARBAZEPINE 300 MG/1
150 TABLET, FILM COATED ORAL
Status: DISCONTINUED | OUTPATIENT
Start: 2025-02-18 | End: 2025-02-20 | Stop reason: HOSPADM

## 2025-02-17 RX ORDER — OXCARBAZEPINE 600 MG/1
600 TABLET, FILM COATED ORAL EVERY MORNING
COMMUNITY

## 2025-02-17 RX ORDER — ACETAMINOPHEN 325 MG/1
650 TABLET ORAL ONCE
Status: COMPLETED | OUTPATIENT
Start: 2025-02-17 | End: 2025-02-17

## 2025-02-17 RX ORDER — OXCARBAZEPINE 300 MG/1
600 TABLET, FILM COATED ORAL 2 TIMES DAILY
Status: DISCONTINUED | OUTPATIENT
Start: 2025-02-17 | End: 2025-02-17

## 2025-02-17 RX ORDER — QUETIAPINE FUMARATE 100 MG/1
400 TABLET, FILM COATED ORAL NIGHTLY
Status: DISCONTINUED | OUTPATIENT
Start: 2025-02-17 | End: 2025-02-20 | Stop reason: HOSPADM

## 2025-02-17 RX ORDER — ACETAMINOPHEN 650 MG/1
650 SUPPOSITORY RECTAL EVERY 4 HOURS PRN
Status: DISCONTINUED | OUTPATIENT
Start: 2025-02-17 | End: 2025-02-19 | Stop reason: SDUPTHER

## 2025-02-17 RX ORDER — OXCARBAZEPINE 300 MG/1
150 TABLET, FILM COATED ORAL 2 TIMES DAILY
Status: DISCONTINUED | OUTPATIENT
Start: 2025-02-17 | End: 2025-02-17

## 2025-02-17 RX ORDER — ONDANSETRON 4 MG/1
4 TABLET, ORALLY DISINTEGRATING ORAL EVERY 8 HOURS PRN
Status: DISCONTINUED | OUTPATIENT
Start: 2025-02-17 | End: 2025-02-20 | Stop reason: HOSPADM

## 2025-02-17 RX ORDER — CLONIDINE HYDROCHLORIDE 0.1 MG/1
0.1 TABLET ORAL 2 TIMES DAILY
Status: DISCONTINUED | OUTPATIENT
Start: 2025-02-17 | End: 2025-02-20 | Stop reason: HOSPADM

## 2025-02-17 RX ORDER — ENOXAPARIN SODIUM 100 MG/ML
40 INJECTION SUBCUTANEOUS DAILY
Status: DISCONTINUED | OUTPATIENT
Start: 2025-02-17 | End: 2025-02-17 | Stop reason: DRUGHIGH

## 2025-02-17 RX ORDER — ONDANSETRON 4 MG/1
4 TABLET, ORALLY DISINTEGRATING ORAL 3 TIMES DAILY PRN
Status: DISCONTINUED | OUTPATIENT
Start: 2025-02-17 | End: 2025-02-20 | Stop reason: HOSPADM

## 2025-02-17 RX ORDER — CEPHALEXIN 500 MG/1
500 CAPSULE ORAL 4 TIMES DAILY
Status: DISCONTINUED | OUTPATIENT
Start: 2025-02-17 | End: 2025-02-20

## 2025-02-17 RX ORDER — VALPROIC ACID 250 MG/5ML
625 SOLUTION ORAL DAILY
Status: DISCONTINUED | OUTPATIENT
Start: 2025-02-17 | End: 2025-02-20 | Stop reason: HOSPADM

## 2025-02-17 RX ORDER — SODIUM CHLORIDE 0.9 % (FLUSH) 0.9 %
10 SYRINGE (ML) INJECTION EVERY 12 HOURS SCHEDULED
Status: DISCONTINUED | OUTPATIENT
Start: 2025-02-17 | End: 2025-02-20 | Stop reason: HOSPADM

## 2025-02-17 RX ORDER — IPRATROPIUM BROMIDE AND ALBUTEROL SULFATE 2.5; .5 MG/3ML; MG/3ML
1 SOLUTION RESPIRATORY (INHALATION) 3 TIMES DAILY
Status: DISCONTINUED | OUTPATIENT
Start: 2025-02-18 | End: 2025-02-18

## 2025-02-17 RX ORDER — IPRATROPIUM BROMIDE AND ALBUTEROL SULFATE 2.5; .5 MG/3ML; MG/3ML
1 SOLUTION RESPIRATORY (INHALATION)
Status: DISCONTINUED | OUTPATIENT
Start: 2025-02-17 | End: 2025-02-17

## 2025-02-17 RX ORDER — BUSPIRONE HYDROCHLORIDE 10 MG/1
30 TABLET ORAL 2 TIMES DAILY
Status: DISCONTINUED | OUTPATIENT
Start: 2025-02-17 | End: 2025-02-20 | Stop reason: HOSPADM

## 2025-02-17 RX ORDER — ALBUTEROL SULFATE 0.83 MG/ML
2.5 SOLUTION RESPIRATORY (INHALATION) EVERY 6 HOURS PRN
Status: DISCONTINUED | OUTPATIENT
Start: 2025-02-17 | End: 2025-02-18

## 2025-02-17 RX ORDER — METOPROLOL TARTRATE 25 MG/1
25 TABLET, FILM COATED ORAL 2 TIMES DAILY
Status: DISCONTINUED | OUTPATIENT
Start: 2025-02-17 | End: 2025-02-20 | Stop reason: HOSPADM

## 2025-02-17 RX ORDER — GUAIFENESIN 200 MG/10ML
LIQUID ORAL 2 TIMES DAILY
Status: DISCONTINUED | OUTPATIENT
Start: 2025-02-17 | End: 2025-02-20 | Stop reason: HOSPADM

## 2025-02-17 RX ORDER — ENALAPRIL MALEATE 10 MG/1
10 TABLET ORAL DAILY
Status: DISCONTINUED | OUTPATIENT
Start: 2025-02-18 | End: 2025-02-17

## 2025-02-17 RX ORDER — GUAIFENESIN AND DEXTROMETHORPHAN 20; 200 MG/10ML; MG/10ML
10 SYRUP ORAL EVERY 6 HOURS PRN
COMMUNITY

## 2025-02-17 RX ORDER — VALPROIC ACID 250 MG/5ML
1000 SOLUTION ORAL NIGHTLY
Status: DISCONTINUED | OUTPATIENT
Start: 2025-02-17 | End: 2025-02-20 | Stop reason: HOSPADM

## 2025-02-17 RX ORDER — LACTULOSE 10 G/15ML
40 SOLUTION ORAL EVERY MORNING
COMMUNITY

## 2025-02-17 RX ORDER — BENZONATATE 100 MG/1
100 CAPSULE ORAL 3 TIMES DAILY PRN
Status: DISCONTINUED | OUTPATIENT
Start: 2025-02-17 | End: 2025-02-20 | Stop reason: HOSPADM

## 2025-02-17 RX ORDER — OXCARBAZEPINE 300 MG/1
600 TABLET, FILM COATED ORAL 2 TIMES DAILY
Status: DISCONTINUED | OUTPATIENT
Start: 2025-02-17 | End: 2025-02-20 | Stop reason: HOSPADM

## 2025-02-17 RX ORDER — SODIUM CHLORIDE 9 MG/ML
INJECTION, SOLUTION INTRAVENOUS CONTINUOUS
Status: ACTIVE | OUTPATIENT
Start: 2025-02-17 | End: 2025-02-18

## 2025-02-17 RX ORDER — CETIRIZINE HYDROCHLORIDE 10 MG/1
10 TABLET ORAL DAILY
Status: DISCONTINUED | OUTPATIENT
Start: 2025-02-18 | End: 2025-02-20 | Stop reason: HOSPADM

## 2025-02-17 RX ORDER — BACITRACIN ZINC 500 [USP'U]/G
OINTMENT TOPICAL 2 TIMES DAILY
Status: DISCONTINUED | OUTPATIENT
Start: 2025-02-17 | End: 2025-02-20 | Stop reason: HOSPADM

## 2025-02-17 RX ORDER — ENOXAPARIN SODIUM 100 MG/ML
30 INJECTION SUBCUTANEOUS 2 TIMES DAILY
Status: DISCONTINUED | OUTPATIENT
Start: 2025-02-17 | End: 2025-02-18

## 2025-02-17 RX ORDER — ACETAMINOPHEN 650 MG/1
650 SUPPOSITORY RECTAL EVERY 6 HOURS PRN
Status: DISCONTINUED | OUTPATIENT
Start: 2025-02-17 | End: 2025-02-20 | Stop reason: HOSPADM

## 2025-02-17 RX ORDER — POTASSIUM CHLORIDE 7.45 MG/ML
10 INJECTION INTRAVENOUS PRN
Status: DISCONTINUED | OUTPATIENT
Start: 2025-02-17 | End: 2025-02-20

## 2025-02-17 RX ORDER — SODIUM CHLORIDE 9 MG/ML
INJECTION, SOLUTION INTRAVENOUS PRN
Status: DISCONTINUED | OUTPATIENT
Start: 2025-02-17 | End: 2025-02-20 | Stop reason: HOSPADM

## 2025-02-17 RX ORDER — ONDANSETRON 2 MG/ML
4 INJECTION INTRAMUSCULAR; INTRAVENOUS EVERY 6 HOURS PRN
Status: DISCONTINUED | OUTPATIENT
Start: 2025-02-17 | End: 2025-02-20 | Stop reason: HOSPADM

## 2025-02-17 RX ORDER — DOCUSATE SODIUM 100 MG/1
100 CAPSULE, LIQUID FILLED ORAL 2 TIMES DAILY PRN
COMMUNITY

## 2025-02-17 RX ORDER — LORAZEPAM 1 MG/1
1 TABLET ORAL EVERY 6 HOURS PRN
Status: DISCONTINUED | OUTPATIENT
Start: 2025-02-17 | End: 2025-02-20 | Stop reason: HOSPADM

## 2025-02-17 RX ORDER — VALPROIC ACID 250 MG/5ML
1000 SOLUTION ORAL
COMMUNITY

## 2025-02-17 RX ORDER — AMMONIUM LACTATE 12 G/100G
CREAM TOPICAL DAILY
COMMUNITY

## 2025-02-17 RX ORDER — CHLORHEXIDINE GLUCONATE ORAL RINSE 1.2 MG/ML
15 SOLUTION DENTAL SEE ADMIN INSTRUCTIONS
COMMUNITY

## 2025-02-17 RX ORDER — LORAZEPAM 2 MG/ML
1.5 INJECTION INTRAMUSCULAR EVERY 6 HOURS PRN
COMMUNITY

## 2025-02-17 RX ORDER — MECLIZINE HYDROCHLORIDE 25 MG/1
25 TABLET ORAL 3 TIMES DAILY PRN
Status: DISCONTINUED | OUTPATIENT
Start: 2025-02-17 | End: 2025-02-20 | Stop reason: HOSPADM

## 2025-02-17 RX ORDER — CHLORHEXIDINE GLUCONATE ORAL RINSE 1.2 MG/ML
15 SOLUTION DENTAL 2 TIMES DAILY
COMMUNITY

## 2025-02-17 RX ORDER — QUETIAPINE FUMARATE 100 MG/1
200 TABLET, FILM COATED ORAL 2 TIMES DAILY
Status: DISCONTINUED | OUTPATIENT
Start: 2025-02-17 | End: 2025-02-20 | Stop reason: HOSPADM

## 2025-02-17 RX ORDER — SODIUM CHLORIDE 0.9 % (FLUSH) 0.9 %
10 SYRINGE (ML) INJECTION PRN
Status: DISCONTINUED | OUTPATIENT
Start: 2025-02-17 | End: 2025-02-20 | Stop reason: HOSPADM

## 2025-02-17 RX ORDER — SODIUM FLUORIDE 5 MG/ML
PASTE, DENTIFRICE DENTAL EVERY EVENING
COMMUNITY

## 2025-02-17 RX ORDER — MAGNESIUM HYDROXIDE/ALUMINUM HYDROXICE/SIMETHICONE 120; 1200; 1200 MG/30ML; MG/30ML; MG/30ML
30 SUSPENSION ORAL EVERY 4 HOURS PRN
COMMUNITY

## 2025-02-17 RX ORDER — 0.9 % SODIUM CHLORIDE 0.9 %
1000 INTRAVENOUS SOLUTION INTRAVENOUS ONCE
Status: COMPLETED | OUTPATIENT
Start: 2025-02-17 | End: 2025-02-17

## 2025-02-17 RX ORDER — QUETIAPINE FUMARATE 100 MG/1
500 TABLET, FILM COATED ORAL
Status: DISCONTINUED | OUTPATIENT
Start: 2025-02-18 | End: 2025-02-17

## 2025-02-17 RX ORDER — OXCARBAZEPINE 300 MG/1
600 TABLET, FILM COATED ORAL
Status: DISCONTINUED | OUTPATIENT
Start: 2025-02-18 | End: 2025-02-20 | Stop reason: HOSPADM

## 2025-02-17 RX ORDER — ACETAMINOPHEN 325 MG/1
650 TABLET ORAL EVERY 4 HOURS PRN
Status: DISCONTINUED | OUTPATIENT
Start: 2025-02-17 | End: 2025-02-19 | Stop reason: SDUPTHER

## 2025-02-17 RX ADMIN — ENOXAPARIN SODIUM 30 MG: 100 INJECTION SUBCUTANEOUS at 20:40

## 2025-02-17 RX ADMIN — QUETIAPINE FUMARATE 200 MG: 100 TABLET ORAL at 17:17

## 2025-02-17 RX ADMIN — BUSPIRONE HYDROCHLORIDE 30 MG: 10 TABLET ORAL at 20:40

## 2025-02-17 RX ADMIN — SODIUM CHLORIDE: 0.9 INJECTION, SOLUTION INTRAVENOUS at 23:52

## 2025-02-17 RX ADMIN — VALPROIC ACID 1000 MG: 250 SOLUTION ORAL at 20:40

## 2025-02-17 RX ADMIN — GUAIFENESIN, DEXTROMETHORPHAN HBR 1 TABLET: 600; 30 TABLET ORAL at 20:41

## 2025-02-17 RX ADMIN — BACITRACIN ZINC: 500 OINTMENT TOPICAL at 21:05

## 2025-02-17 RX ADMIN — BACITRACIN ZINC: 500 OINTMENT TOPICAL at 10:37

## 2025-02-17 RX ADMIN — CEPHALEXIN 500 MG: 500 CAPSULE ORAL at 17:13

## 2025-02-17 RX ADMIN — IPRATROPIUM BROMIDE AND ALBUTEROL SULFATE 1 DOSE: .5; 2.5 SOLUTION RESPIRATORY (INHALATION) at 20:03

## 2025-02-17 RX ADMIN — SODIUM CHLORIDE 1000 ML: 9 INJECTION, SOLUTION INTRAVENOUS at 10:43

## 2025-02-17 RX ADMIN — CLONIDINE HYDROCHLORIDE 0.1 MG: 0.1 TABLET ORAL at 20:41

## 2025-02-17 RX ADMIN — FAMOTIDINE 20 MG: 20 TABLET, FILM COATED ORAL at 20:41

## 2025-02-17 RX ADMIN — QUETIAPINE FUMARATE 400 MG: 100 TABLET ORAL at 20:41

## 2025-02-17 RX ADMIN — BENZONATATE 100 MG: 100 CAPSULE ORAL at 23:55

## 2025-02-17 RX ADMIN — CEPHALEXIN 500 MG: 500 CAPSULE ORAL at 20:41

## 2025-02-17 RX ADMIN — ACETAMINOPHEN 650 MG: 325 TABLET ORAL at 10:44

## 2025-02-17 RX ADMIN — BENZONATATE 100 MG: 100 CAPSULE ORAL at 17:13

## 2025-02-17 RX ADMIN — METOPROLOL TARTRATE 25 MG: 25 TABLET, FILM COATED ORAL at 20:41

## 2025-02-17 RX ADMIN — SODIUM CHLORIDE: 0.9 INJECTION, SOLUTION INTRAVENOUS at 15:28

## 2025-02-17 RX ADMIN — OXCARBAZEPINE 600 MG: 300 TABLET, FILM COATED ORAL at 20:41

## 2025-02-17 RX ADMIN — ONDANSETRON 4 MG: 2 INJECTION, SOLUTION INTRAMUSCULAR; INTRAVENOUS at 10:43

## 2025-02-17 ASSESSMENT — LIFESTYLE VARIABLES
HOW MANY STANDARD DRINKS CONTAINING ALCOHOL DO YOU HAVE ON A TYPICAL DAY: PATIENT DOES NOT DRINK
HOW OFTEN DO YOU HAVE A DRINK CONTAINING ALCOHOL: NEVER

## 2025-02-17 NOTE — H&P
History and Physical    Patient:  Thierry Lizama  MRN: [id number]    Chief Complaint: Altered mental status     History Obtained From:  non-family caregiver -[facility] staff, electronic medical record, reason patient could not give history:  altered mental status    PCP: Glory Le MD    History of Present Illness:   The patient is a 39 y.o. male who presented from [facility] with confusion.  Patient was seen in the emergency room on 2/16 was diagnosed with bronchitis and UTI and discharged home with oral Keflex.  Since that time patient has become very weak and struggling to swallow pills.  Patient himself complains of cough.  [facility] staff reported poor oral intake including fluids.  Patient initially was flu and COVID-negative however RVP continues to pend.  During patient's evaluation mucous membranes are dry.  He is afebrile.  He is tachycardic but no hypotension, tachypnea or hypoxia are seen.  CMP is unremarkable.  CBC unremarkable.  In review of his urinalysis from 2/16 patient had 10-20 WBCs was negative for leukocytes and nitrites.  Urine culture continues to pend.  Patient was given 1 L fluid bolus.    Past Medical History:        Diagnosis Date    Aortic valve defect birth    Aortic valve stenosis     (unknown date of diagnosis, in med record from [facility])    Bipolar disorder (HCC)     (unknown date of diagnosis, in med record from [facility])    Chronic mental illness     Coarctation of aorta     (unknown date of diagnosis, in med record from [facility])    Depression     (unknown date of diagnosis, in med record from [facility])    Explosive personality disorder (HCC)     (unknown date of diagnosis, in med record from [facility])    Frostbite of both feet 01/18/2018    hospitalized at [facility] - required debridement of both feet    History of seizure disorder     \"had seizures when younger d/t abilify\" (med record from

## 2025-02-17 NOTE — PROGRESS NOTES
Physical Therapy  Facility/Department: [facility] MED SURG  Physical Therapy Initial Assessment    Name: Thierry Lizama  : 1985  MRN: [id number]  Date of Service: 2025    Discharge Recommendations:  Continue to assess pending progress, Long Term Care with PT   PT Equipment Recommendations  Equipment Needed: No      Patient Diagnosis(es): The primary encounter diagnosis was Dehydration. Diagnoses of Viral syndrome and Urinary tract infection without hematuria, site unspecified were also pertinent to this visit.  Past Medical History:  has a past medical history of Aortic valve defect, Aortic valve stenosis, Bipolar disorder ([facility]), Chronic mental illness, Coarctation of aorta, Depression, Explosive personality disorder ([facility]), Frostbite of both feet, History of seizure disorder, and Hypertension.  Past Surgical History:  has a past surgical history that includes Cardiac surgery (); Pilonidal cyst drainage (); and Lung surgery (2014).    Assessment  Body Structures, Functions, Activity Limitations Requiring Skilled Therapeutic Intervention: Decreased functional mobility ;Decreased ADL status;Decreased strength;Decreased endurance;Decreased balance;Decreased posture;Decreased high-level IADLs  Assessment: The patient is a 39 y.o. male who was admitted due to dehydration.  He demonstrates LE weakness, decreased activity endurance and impaired balance.  He would benefit from skilled PT to address his deficits to improve functional mobility.  Treatment Diagnosis: Impaired balance, decreased activity endurance  Therapy Prognosis: Fair  Decision Making: Medium Complexity  Requires PT Follow-Up: Yes  Activity Tolerance  Activity Tolerance: Patient tolerated evaluation without incident    Plan  Physical Therapy Plan  General Plan: 2 times a day 7 days a week (1x/day on weekends)  Current Treatment Recommendations: Strengthening, ROM, Balance training, Functional mobility training, Transfer training,

## 2025-02-17 NOTE — ED PROVIDER NOTES
Response: Obeys commands  Earl Coma Scale Score: 15               PHYSICAL EXAM    (up to 7 for level 4, 8 or more for level 5)     ED Triage Vitals [02/17/25 0957]   BP Systolic BP Percentile Diastolic BP Percentile Temp Temp Source Pulse Respirations SpO2   (!) 125/46 -- -- 98.3 °F (36.8 °C) Oral 93 18 92 %      Height Weight         -- --             Physical Exam  Vitals and nursing note reviewed.   Constitutional:       Comments: Patient is alert nontoxic-appearing.  He is at baseline mental functioning.  He is in no acute distress   Musculoskeletal:      Comments: Left great toe: Patient had blood noted on his sock.  Sock was removed and patient has a Band-Aid on his left great toe.  There is no active bleeding Band-Aid was removed and patient does have an injury appears to be from stubbing his toenail is fractured there is no erythema purulent drainage or sign of secondary infection.  Caregiver was unaware of the injury or when it occurred.  No obvious deformity.  Distal cap refill is brisk   Neurological:      Mental Status: He is alert.      Comments: Baseline mental functioning.  Moving all extremities equally.  No gross sensory or motor deficits noted         DIAGNOSTIC RESULTS     EKG: All EKG's are interpreted by the Emergency Department Physician who either signs or Co-signs this chart in the absence of a cardiologist.        RADIOLOGY:   Non-plain film images such as CT, Ultrasound and MRI are read by the radiologist. Plain radiographic images are visualized and preliminarily interpreted by the emergency physician with the below findings:        Interpretation per the Radiologist below, if available at the time of this note:    XR CHEST PORTABLE    (Results Pending)   XR TOE LEFT (MIN 2 VIEWS)    (Results Pending)         ED BEDSIDE ULTRASOUND:   Performed by ED Physician - none    LABS:  Labs Reviewed   CULTURE, BLOOD 1   CULTURE, BLOOD 2   LACTIC ACID   CBC WITH AUTO DIFFERENTIAL   COMPREHENSIVE

## 2025-02-17 NOTE — PROGRESS NOTES
Patient resting comfortably at this time. Mother at bedside. Patient denies any pain and denies any other needs at this time. Patient alert & oriented to self and birthday. Able to answer simple questions and follow commands. Assessment and vitals as charted. Bed alarm on, bed locked, gripper socks on, call light within reach.. Will continue to monitor this shift.

## 2025-02-17 NOTE — PROGRESS NOTES
Occupational Therapy  Facility/Department: [facility] MED SURG  Occupational Therapy Initial Assessment    Name: Thierry Lizama  : 1985  MRN: [id number]  Date of Service: 2025    Discharge Recommendations:  Continue to assess pending progress          Patient Diagnosis(es): The primary encounter diagnosis was Dehydration. Diagnoses of Viral syndrome and Urinary tract infection without hematuria, site unspecified were also pertinent to this visit.  Past Medical History:  has a past medical history of Aortic valve defect, Aortic valve stenosis, Bipolar disorder ([facility]), Chronic mental illness, Coarctation of aorta, Depression, Explosive personality disorder ([facility]), Frostbite of both feet, History of seizure disorder, and Hypertension.  Past Surgical History:  has a past surgical history that includes Cardiac surgery (); Pilonidal cyst drainage (); and Lung surgery (2014).    Treatment Diagnosis: weakness      Assessment  Performance deficits / Impairments: Decreased functional mobility ;Decreased endurance;Decreased ADL status;Decreased balance;Decreased safe awareness;Decreased coordination  Assessment: 38 y/o M admitted to T for dehydration resulting in generalized weakness and increased need for assist during ADL. Patient would benefit from OT services to address to ensure safe return to [facility].  Treatment Diagnosis: weakness  Prognosis: Fair  Decision Making: Medium Complexity  REQUIRES OT FOLLOW-UP: Yes     Plan  Occupational Therapy Plan  Times Per Day: Once a day  Days Per Week: 7 Days  Current Treatment Recommendations: Strengthening, Balance training, Functional mobility training, Endurance training, Safety education & training, Patient/Caregiver education & training, Equipment evaluation, education, & procurement, Self-Care / ADL    Restrictions  Restrictions/Precautions  Restrictions/Precautions: Fall Risk, General Precautions    Subjective  Subjective  Subjective: denies pain, agreeable

## 2025-02-17 NOTE — PROGRESS NOTES
Pharmacist Review and Automatic Dose Adjustment of Prophylactic Enoxaparin         The reviewing pharmacist has made an adjustment to the ordered enoxaparin dose or converted to UFH per the approved [facility] protocol and table as identified below.        Thierry Lizama is a 39 y.o. male.     Recent Labs     02/17/25  1005   CREATININE 1.0       Estimated Creatinine Clearance: 110 mL/min (based on SCr of 1 mg/dL).    Recent Labs     02/17/25  1005   HGB 13.6   HCT 42.0   PLT See Reflexed IPF Result     No results for input(s): \"INR\" in the last 72 hours.    Height:   Ht Readings from Last 1 Encounters:   02/16/25 1.626 m (5' 4\")     Weight:  Wt Readings from Last 1 Encounters:   02/16/25 103.4 kg (228 lb)               Plan: Based upon the patient's weight and renal function    Ordered: Enoxaparin 40mg SUBQ Daily    Changed/converted to    New Order: Enoxaparin 30mg SUBQ BID      Thank you,  Ewelina Palencia, [facility]  2/17/2025, 3:21 PM

## 2025-02-17 NOTE — ED PROVIDER NOTES
[facility] EMERGENCY DEPARTMENT  Emergency Department  Emergency Medicine Patient Handoff     Note to patient: The 21st Century Cures Act requires that medical notes like this one to be available to patients in the interest of transparency. Please be advised, this is a medical document. It is intended as [id number] communication. It is written in medical language and may contain abbreviations and verbiage that may be unfamiliar. It may appear to read blunt or direct. Medical documents are intended to carry relevant medical information, facts as evident and the clinical opinion of the practitioner.     Note Started: 7:49 PM EST    Care of Thierry Lizama was assumed from Dr. Masters and is being seen for Fall, Gait Problem, Dizziness, Vomiting, and Altered Mental Status (Pt to ED from [facility] with c/o emesis, unsteady gait, dizziness, /altered mental status, and unwitnessed. Onset of symptoms x14 hours ago./Per report, pt has had a respiratory illness since 2/12/25./Pt had an unwitnessed fall today at approx. 2am./Pt's gait is unsteady. Pt has been demonstrating unusual behaviors./Also noted pt is lethargic and sluggish./Pt received Tylenol and Tussin at 815 am. Pt received Motrin at 1215 pm.)  .  The patient's initial evaluation and plan have been discussed with the prior provider who initially evaluated the patient.     EMERGENCY DEPARTMENT COURSE / MEDICAL DECISION MAKING:       MEDICATIONS GIVEN:  Orders Placed This Encounter   Medications    ipratropium 0.5 mg-albuterol 2.5 mg (DUONEB) nebulizer solution 1 Dose     Order Specific Question:   Initiate RT Bronchodilator Protocol     Answer:   No    DISCONTD: ondansetron (ZOFRAN) injection 4 mg    ondansetron (ZOFRAN-ODT) disintegrating tablet 4 mg    albuterol (PROVENTIL) (2.5 MG/3ML) 0.083% nebulizer solution 7.5 mg     Order Specific Question:   Initiate RT Bronchodilator Protocol     Answer:   No    albuterol (PROVENTIL) (2.5 MG/3ML) 0.083% nebulizer

## 2025-02-18 ENCOUNTER — APPOINTMENT (OUTPATIENT)
Dept: CT IMAGING | Age: 40
DRG: 195 | End: 2025-02-18
Payer: MEDICARE

## 2025-02-18 PROBLEM — J10.1 INFLUENZA A: Status: ACTIVE | Noted: 2025-02-18

## 2025-02-18 PROBLEM — J18.9 MULTIFOCAL PNEUMONIA: Status: ACTIVE | Noted: 2025-02-17

## 2025-02-18 LAB
ANION GAP SERPL CALCULATED.3IONS-SCNC: 12 MMOL/L (ref 9–16)
B PARAP IS1001 DNA NPH QL NAA+NON-PROBE: NOT DETECTED
B PERT DNA SPEC QL NAA+PROBE: NOT DETECTED
BASOPHILS # BLD: <0.03 K/UL (ref 0–0.2)
BASOPHILS NFR BLD: 0 % (ref 0–2)
BUN SERPL-MCNC: 22 MG/DL (ref 6–20)
BUN/CREAT SERPL: 31 (ref 9–20)
C PNEUM DNA NPH QL NAA+NON-PROBE: NOT DETECTED
CALCIUM SERPL-MCNC: 7.9 MG/DL (ref 8.6–10.4)
CHLORIDE SERPL-SCNC: 105 MMOL/L (ref 98–107)
CO2 SERPL-SCNC: 25 MMOL/L (ref 20–31)
CREAT SERPL-MCNC: 0.7 MG/DL (ref 0.7–1.2)
CRP SERPL HS-MCNC: 49.7 MG/L (ref 0–5)
EKG ATRIAL RATE: 84 BPM
EKG P AXIS: 58 DEGREES
EKG P-R INTERVAL: 224 MS
EKG Q-T INTERVAL: 374 MS
EKG QRS DURATION: 112 MS
EKG QTC CALCULATION (BAZETT): 441 MS
EKG R AXIS: -19 DEGREES
EKG T AXIS: 15 DEGREES
EKG VENTRICULAR RATE: 84 BPM
EOSINOPHIL # BLD: <0.03 K/UL (ref 0–0.44)
EOSINOPHILS RELATIVE PERCENT: 0 % (ref 1–4)
ERYTHROCYTE [DISTWIDTH] IN BLOOD BY AUTOMATED COUNT: 14.7 % (ref 11.8–14.4)
FLUAV RNA NPH QL NAA+NON-PROBE: DETECTED
FLUBV RNA NPH QL NAA+NON-PROBE: NOT DETECTED
GFR, ESTIMATED: >90 ML/MIN/1.73M2
GLUCOSE SERPL-MCNC: 97 MG/DL (ref 74–99)
HADV DNA NPH QL NAA+NON-PROBE: NOT DETECTED
HCOV 229E RNA NPH QL NAA+NON-PROBE: NOT DETECTED
HCOV HKU1 RNA NPH QL NAA+NON-PROBE: NOT DETECTED
HCOV NL63 RNA NPH QL NAA+NON-PROBE: NOT DETECTED
HCOV OC43 RNA NPH QL NAA+NON-PROBE: NOT DETECTED
HCT VFR BLD AUTO: 36.9 % (ref 40.7–50.3)
HGB BLD-MCNC: 12.2 G/DL (ref 13–17)
HMPV RNA NPH QL NAA+NON-PROBE: NOT DETECTED
HPIV1 RNA NPH QL NAA+NON-PROBE: NOT DETECTED
HPIV2 RNA NPH QL NAA+NON-PROBE: NOT DETECTED
HPIV3 RNA NPH QL NAA+NON-PROBE: NOT DETECTED
HPIV4 RNA NPH QL NAA+NON-PROBE: NOT DETECTED
IMM GRANULOCYTES # BLD AUTO: 0.08 K/UL (ref 0–0.3)
IMM GRANULOCYTES NFR BLD: 1 %
LYMPHOCYTES NFR BLD: 1.15 K/UL (ref 1.1–3.7)
LYMPHOCYTES RELATIVE PERCENT: 17 % (ref 24–43)
M PNEUMO DNA NPH QL NAA+NON-PROBE: NOT DETECTED
MCH RBC QN AUTO: 29.2 PG (ref 25.2–33.5)
MCHC RBC AUTO-ENTMCNC: 33.1 G/DL (ref 28.4–34.8)
MCV RBC AUTO: 88.3 FL (ref 82.6–102.9)
MICROORGANISM SPEC CULT: NORMAL
MONOCYTES NFR BLD: 0.67 K/UL (ref 0.1–1.2)
MONOCYTES NFR BLD: 10 % (ref 3–12)
NEUTROPHILS NFR BLD: 71 % (ref 36–65)
NEUTS SEG NFR BLD: 4.69 K/UL (ref 1.5–8.1)
NRBC BLD-RTO: 0 PER 100 WBC
PLATELET # BLD AUTO: ABNORMAL K/UL (ref 138–453)
PLATELET, FLUORESCENCE: 128 K/UL (ref 138–453)
PLATELETS.RETICULATED NFR BLD AUTO: 2.7 % (ref 1.1–10.3)
POTASSIUM SERPL-SCNC: 3.6 MMOL/L (ref 3.7–5.3)
RBC # BLD AUTO: 4.18 M/UL (ref 4.21–5.77)
RSV RNA NPH QL NAA+NON-PROBE: NOT DETECTED
RV+EV RNA NPH QL NAA+NON-PROBE: NOT DETECTED
SARS-COV-2 RNA NPH QL NAA+NON-PROBE: NOT DETECTED
SERVICE CMNT-IMP: NORMAL
SODIUM SERPL-SCNC: 142 MMOL/L (ref 136–145)
SPECIMEN DESCRIPTION: ABNORMAL
SPECIMEN DESCRIPTION: NORMAL
WBC OTHER # BLD: 6.6 K/UL (ref 3.5–11.3)

## 2025-02-18 PROCEDURE — 97535 SELF CARE MNGMENT TRAINING: CPT

## 2025-02-18 PROCEDURE — 85025 COMPLETE CBC W/AUTO DIFF WBC: CPT

## 2025-02-18 PROCEDURE — 80048 BASIC METABOLIC PNL TOTAL CA: CPT

## 2025-02-18 PROCEDURE — 2500000003 HC RX 250 WO HCPCS: Performed by: NURSE PRACTITIONER

## 2025-02-18 PROCEDURE — 6370000000 HC RX 637 (ALT 250 FOR IP): Performed by: NURSE PRACTITIONER

## 2025-02-18 PROCEDURE — 97110 THERAPEUTIC EXERCISES: CPT

## 2025-02-18 PROCEDURE — 86140 C-REACTIVE PROTEIN: CPT

## 2025-02-18 PROCEDURE — 94761 N-INVAS EAR/PLS OXIMETRY MLT: CPT

## 2025-02-18 PROCEDURE — 36415 COLL VENOUS BLD VENIPUNCTURE: CPT

## 2025-02-18 PROCEDURE — 6360000002 HC RX W HCPCS: Performed by: NURSE PRACTITIONER

## 2025-02-18 PROCEDURE — 94669 MECHANICAL CHEST WALL OSCILL: CPT

## 2025-02-18 PROCEDURE — 71250 CT THORAX DX C-: CPT

## 2025-02-18 PROCEDURE — 93010 ELECTROCARDIOGRAM REPORT: CPT | Performed by: FAMILY MEDICINE

## 2025-02-18 PROCEDURE — 92610 EVALUATE SWALLOWING FUNCTION: CPT

## 2025-02-18 PROCEDURE — G0378 HOSPITAL OBSERVATION PER HR: HCPCS

## 2025-02-18 PROCEDURE — 6370000000 HC RX 637 (ALT 250 FOR IP): Performed by: STUDENT IN AN ORGANIZED HEALTH CARE EDUCATION/TRAINING PROGRAM

## 2025-02-18 PROCEDURE — 94664 DEMO&/EVAL PT USE INHALER: CPT

## 2025-02-18 PROCEDURE — 97116 GAIT TRAINING THERAPY: CPT

## 2025-02-18 PROCEDURE — 94640 AIRWAY INHALATION TREATMENT: CPT

## 2025-02-18 RX ORDER — OSELTAMIVIR PHOSPHATE 75 MG/1
75 CAPSULE ORAL 2 TIMES DAILY
Status: DISCONTINUED | OUTPATIENT
Start: 2025-02-18 | End: 2025-02-20 | Stop reason: HOSPADM

## 2025-02-18 RX ORDER — IPRATROPIUM BROMIDE AND ALBUTEROL SULFATE 2.5; .5 MG/3ML; MG/3ML
1 SOLUTION RESPIRATORY (INHALATION)
Status: DISCONTINUED | OUTPATIENT
Start: 2025-02-18 | End: 2025-02-20 | Stop reason: HOSPADM

## 2025-02-18 RX ORDER — ALBUTEROL SULFATE 0.83 MG/ML
2.5 SOLUTION RESPIRATORY (INHALATION)
Status: DISCONTINUED | OUTPATIENT
Start: 2025-02-18 | End: 2025-02-20 | Stop reason: HOSPADM

## 2025-02-18 RX ORDER — DOXYCYCLINE 100 MG/1
100 CAPSULE ORAL EVERY 12 HOURS SCHEDULED
Status: DISCONTINUED | OUTPATIENT
Start: 2025-02-18 | End: 2025-02-20 | Stop reason: HOSPADM

## 2025-02-18 RX ORDER — ENOXAPARIN SODIUM 100 MG/ML
40 INJECTION SUBCUTANEOUS DAILY
Status: DISCONTINUED | OUTPATIENT
Start: 2025-02-19 | End: 2025-02-20 | Stop reason: HOSPADM

## 2025-02-18 RX ADMIN — OSELTAMIVIR PHOSPHATE 75 MG: 75 CAPSULE ORAL at 21:00

## 2025-02-18 RX ADMIN — IPRATROPIUM BROMIDE AND ALBUTEROL SULFATE 1 DOSE: 2.5; .5 SOLUTION RESPIRATORY (INHALATION) at 07:41

## 2025-02-18 RX ADMIN — IPRATROPIUM BROMIDE AND ALBUTEROL SULFATE 1 DOSE: .5; 2.5 SOLUTION RESPIRATORY (INHALATION) at 15:53

## 2025-02-18 RX ADMIN — OXCARBAZEPINE 600 MG: 300 TABLET, FILM COATED ORAL at 09:15

## 2025-02-18 RX ADMIN — FAMOTIDINE 20 MG: 20 TABLET, FILM COATED ORAL at 08:45

## 2025-02-18 RX ADMIN — OSELTAMIVIR PHOSPHATE 75 MG: 75 CAPSULE ORAL at 12:31

## 2025-02-18 RX ADMIN — QUETIAPINE FUMARATE 200 MG: 100 TABLET ORAL at 16:33

## 2025-02-18 RX ADMIN — GUAIFENESIN, DEXTROMETHORPHAN HBR 1 TABLET: 600; 30 TABLET ORAL at 21:00

## 2025-02-18 RX ADMIN — OXCARBAZEPINE 150 MG: 300 TABLET, FILM COATED ORAL at 08:45

## 2025-02-18 RX ADMIN — OXCARBAZEPINE 600 MG: 300 TABLET, FILM COATED ORAL at 12:30

## 2025-02-18 RX ADMIN — BUSPIRONE HYDROCHLORIDE 30 MG: 10 TABLET ORAL at 08:46

## 2025-02-18 RX ADMIN — BUSPIRONE HYDROCHLORIDE 30 MG: 10 TABLET ORAL at 21:00

## 2025-02-18 RX ADMIN — ALBUTEROL SULFATE 2.5 MG: 2.5 SOLUTION RESPIRATORY (INHALATION) at 04:23

## 2025-02-18 RX ADMIN — SODIUM CHLORIDE, PRESERVATIVE FREE 10 ML: 5 INJECTION INTRAVENOUS at 21:11

## 2025-02-18 RX ADMIN — DOXYCYCLINE HYCLATE 100 MG: 100 CAPSULE ORAL at 08:46

## 2025-02-18 RX ADMIN — IPRATROPIUM BROMIDE AND ALBUTEROL SULFATE 1 DOSE: .5; 2.5 SOLUTION RESPIRATORY (INHALATION) at 20:42

## 2025-02-18 RX ADMIN — SKIN PROTECTANT: 33 OINTMENT TOPICAL at 12:31

## 2025-02-18 RX ADMIN — BACITRACIN ZINC: 500 OINTMENT TOPICAL at 12:32

## 2025-02-18 RX ADMIN — DOXYCYCLINE HYCLATE 100 MG: 100 CAPSULE ORAL at 21:00

## 2025-02-18 RX ADMIN — QUETIAPINE FUMARATE 400 MG: 100 TABLET ORAL at 21:00

## 2025-02-18 RX ADMIN — FAMOTIDINE 20 MG: 20 TABLET, FILM COATED ORAL at 21:00

## 2025-02-18 RX ADMIN — BACITRACIN ZINC: 500 OINTMENT TOPICAL at 21:09

## 2025-02-18 RX ADMIN — CETIRIZINE HYDROCHLORIDE 10 MG: 10 TABLET, FILM COATED ORAL at 08:45

## 2025-02-18 RX ADMIN — METOPROLOL TARTRATE 25 MG: 25 TABLET, FILM COATED ORAL at 21:00

## 2025-02-18 RX ADMIN — ENOXAPARIN SODIUM 30 MG: 100 INJECTION SUBCUTANEOUS at 08:44

## 2025-02-18 RX ADMIN — METOPROLOL TARTRATE 25 MG: 25 TABLET, FILM COATED ORAL at 08:44

## 2025-02-18 RX ADMIN — VALPROIC ACID 625 MG: 250 SOLUTION ORAL at 08:48

## 2025-02-18 RX ADMIN — ONDANSETRON 4 MG: 2 INJECTION, SOLUTION INTRAMUSCULAR; INTRAVENOUS at 09:01

## 2025-02-18 RX ADMIN — CLONIDINE HYDROCHLORIDE 0.1 MG: 0.1 TABLET ORAL at 08:46

## 2025-02-18 RX ADMIN — GUAIFENESIN, DEXTROMETHORPHAN HBR 1 TABLET: 600; 30 TABLET ORAL at 08:46

## 2025-02-18 RX ADMIN — VALPROIC ACID 1000 MG: 250 SOLUTION ORAL at 21:00

## 2025-02-18 RX ADMIN — ENALAPRIL MALEATE 20 MG: 10 TABLET ORAL at 08:44

## 2025-02-18 RX ADMIN — WATER 1000 MG: 1 INJECTION INTRAMUSCULAR; INTRAVENOUS; SUBCUTANEOUS at 08:46

## 2025-02-18 RX ADMIN — CLONIDINE HYDROCHLORIDE 0.1 MG: 0.1 TABLET ORAL at 21:00

## 2025-02-18 RX ADMIN — OXCARBAZEPINE 600 MG: 300 TABLET, FILM COATED ORAL at 21:04

## 2025-02-18 RX ADMIN — BENZONATATE 100 MG: 100 CAPSULE ORAL at 08:44

## 2025-02-18 RX ADMIN — QUETIAPINE FUMARATE 200 MG: 100 TABLET ORAL at 08:45

## 2025-02-18 NOTE — ACP (ADVANCE CARE PLANNING)
Advance Care Planning     Palliative Team Advance Care Planning (ACP) Conversation    Date of Conversation: 02/18/25    Individuals present for the conversation: Legal Guardian Elisa     ACP documents on file prior to discussion:  -Legal Guardianship Document  -Portable DNR        Healthcare Decision Maker:    Primary Decision Maker: ELISA NORTON - Legal Guardian, Legal Guardian - [phone]     Conversation Summary:  Elisa confirms DNR-CCA status    Resuscitation Status:   Code Status: DNR-CCA     Documentation Completed:  -No new documents completed.    I spent 25 minutes with the patient and/or surrogate decision maker discussing the patient's wishes and goals.      Jennifer Salinas RN

## 2025-02-18 NOTE — PROGRESS NOTES
Progress Note    SUBJECTIVE:    Patient seen for f/u of Multifocal pneumonia.  He resting in bed no distress. Continues with cough.  Visitor at side.      ROS:   Constitutional: negative  for fevers, and negative for chills.  Respiratory: negative for shortness of breath, positive for cough, and negative for wheezing  Cardiovascular: negative for chest pain, and negative for palpitations  Gastrointestinal: negative for abdominal pain, negative for nausea,negative for vomiting, negative for diarrhea, and negative for constipation     All other systems were reviewed with the patient and are negative unless otherwise stated in HPI      OBJECTIVE:      Vitals:   Vitals:    02/18/25 0744   BP:    Pulse: 99   Resp: 24   Temp:    SpO2: 90%     Weight - Scale: 97.6 kg (215 lb 1.6 oz)   Height: 162.6 cm (5' 4\")     Weight  Wt Readings from Last 3 Encounters:   02/18/25 97.6 kg (215 lb 1.6 oz)   02/16/25 103.4 kg (228 lb)   12/04/24 102.9 kg (226 lb 13.7 oz)     Body mass index is 36.92 kg/m².    24HR INTAKE/OUTPUT:      Intake/Output Summary (Last 24 hours) at 2/18/2025 1035  Last data filed at 2/18/2025 0842  Gross per 24 hour   Intake 3080 ml   Output 775 ml   Net 2305 ml     -----------------------------------------------------------------  Exam:    GEN:    Awake, alert and cooperative and pleasant.  Hoarse voice.   EYES:  EOMI, pupils equal   NECK: Supple. No lymphadenopathy.  No carotid bruit  CVS:    regular rate and rhythm, no audible murmur  PULM:  diminished but clear without wheezing, rales or rhonchi, no acute respiratory distress  ABD:    Bowels sounds normal.  Abdomen is soft.  No distention.  no tenderness to palpation.   EXT:   no edema bilaterally .  No calf tenderness.   NEURO: Moves all extremities.  Motor and sensory are grossly intact  SKIN:  No rashes.  No skin lesions.    -----------------------------------------------------------------    Diagnostic Data:      Complete Blood Count:   Recent Labs

## 2025-02-18 NOTE — PROGRESS NOTES
Comprehensive Nutrition Assessment    Type and Reason for Visit:  Initial    Nutrition Recommendations/Plan:   Encourage slower eating at meals  Hot sauce at meals (if kitchen has)     Malnutrition Assessment:  Malnutrition Status:  At risk for malnutrition (02/18/25 0902)    Context:  Acute Illness     Findings of the 6 clinical characteristics of malnutrition:  Energy Intake:  Mild decrease in energy intake (acute)  Weight Loss:  Mild weight loss     Body Fat Loss:  No body fat loss     Muscle Mass Loss:  No muscle mass loss    Fluid Accumulation:  No fluid accumulation     Strength:  Not Performed    Nutrition Assessment:    Predicted suboptimal nutrient intakes r/t altered respiratory status, AEB cough and lower PO. Additionally with UTI and volume deficit with diet downgraded from usual to soft and bites sized. Per mom and chart review, consumes regular foods at [facility] but is known to eat fast with excess food in oral cavity during meals. Pending SLP eval and hopeful resumption of former eating plan. Weight losses over time with controlled environment compared to home-life where he used to eat foods that were accessible (268# high weight ~7 years ago). Likes hot sauce and will communicate with kitchen to help stimulate intakes regardless of meal textures.    Nutrition Related Findings:    trace BLE edema. self feed. active b/s. Wound Type: None       Current Nutrition Intake & Therapies:    Average Meal Intake: 26-50%  Average Supplements Intake: None Ordered  ADULT DIET; Dysphagia - Soft and Bite Sized    Anthropometric Measures:  Height: 162.6 cm (5' 4\")  Ideal Body Weight (IBW): 130 lbs (59 kg)    Admission Body Weight: 96.2 kg (212 lb)  Current Body Weight: 97.6 kg (215 lb 1.6 oz), 165.5 % IBW. Weight Source: Bed scale  Current BMI (kg/m2): 36.9  Usual Body Weight: 102.8 kg (226 lb 11.2 oz) (2 months ago)     % Weight Change (Calculated): -5.1  Weight Adjustment For: No Adjustment                 BMI

## 2025-02-18 NOTE — CONSULTS
Palliative Care Inpatient Consult    NAME:  Thierry Lizama  MEDICAL RECORD NUMBER:  [id number]  AGE: 39 y.o.   GENDER: male  : 1985  TODAY'S DATE:  2025    Reason for Consult:  code status    History of Present Illness     The patient is a 39 y.o.  Non- / non  male who presents with Altered Mental Status (Patient sent from [facility] for some confusion. He was evaluated in the ER yesterday and diagnosed with bronchitis and uti. Did not start his atb yet. Facility states he has been very weak today and wasn't able to swallow pills. )      Referred to Palliative Care by  [] Physician   [] Nursing  [] Family Request   [] Other: Palliative     He was admitted to the hospitalist service for Dehydration [E86.0]  Viral syndrome [B34.9]  Viral infection [B34.9]  Urinary tract infection without hematuria, site unspecified [N39.0]. The patient has a complicated medical history and has been hospitalized since 2025  9:52 AM.    Active Hospital Problems    Diagnosis Date Noted    Influenza A [J10.1] 2025    Multifocal pneumonia [J18.9] 2025    Explosive personality disorder (HCC) [F60.3]     Hypertension [I10]     Aortic valve stenosis [I35.0]     History of seizure disorder [Z86.69]     Chronic mental illness [F99]        Data         /64   Pulse 99   Temp 99.4 °F (37.4 °C) (Temporal)   Resp 24   Ht 1.626 m (5' 4\")   Wt 97.6 kg (215 lb 1.6 oz)   SpO2 90%   BMI 36.92 kg/m²     Wt Readings from Last 3 Encounters:   25 97.6 kg (215 lb 1.6 oz)   25 103.4 kg (228 lb)   24 102.9 kg (226 lb 13.7 oz)        Code Status: DNR-CCA     ADVANCED CARE PLANNING:  Patient has capacity for medical decisions: no  Health Care Power of :  pt has guardianship  Living Will:  pt has guardianship      Personal, Social, and Family History  Marital Status: single  Living situation:group home  Importance of lamont/Zoroastrianism/spiritual beliefs: [] Very [] Somewhat [] Not

## 2025-02-18 NOTE — CARE COORDINATION
Case Management Assessment  Initial Evaluation    Date/Time of Evaluation: 2/18/2025 7:46 AM  Assessment Completed by: STELLA Ramsey    If patient is discharged prior to next notation, then this note serves as note for discharge by case management.    Patient Name: Thierry Lizama                   YOB: 1985  Diagnosis: Dehydration [E86.0]  Viral syndrome [B34.9]  Viral infection [B34.9]  Urinary tract infection without hematuria, site unspecified [N39.0]                   Date / Time: 2/17/2025  9:52 AM    Patient Admission Status: Observation   Readmission Risk (Low < 19, Mod (19-27), High > 27): No data recorded  Current PCP: No primary care provider on file.  PCP verified by CM? Yes    Chart Reviewed: Yes      History Provided by: Child/Family  Patient Orientation: Alert and Oriented    Patient Cognition: Severely Impaired    Hospitalization in the last 30 days (Readmission):  No    If yes, Readmission Assessment in CM Navigator will be completed.    Advance Directives:      Code Status: Full Code   Patient's Primary Decision Maker is: Named in Scanned ACP Document    Primary Decision Maker: ELISA NORTON - Legal Guardian, Legal Guardian - [phone]    Discharge Planning:    Patient lives with: Other (Comment) Type of Home: Other (Comment) ([facility])  Primary Care Giver: Other (Comment) ([facility] staff)  Patient Support Systems include: Parent, Family Members, /, Other (Comment) ([facility] staff)   Current Financial resources: Medicaid, Medicare  Current community resources: Transportation, Institutional Placement  Current services prior to admission: Durable Medical Equipment, Extended Care Facility, Transportation            Current DME: Walker            Type of Home Care services:  Aide Services, Nursing Services    ADLS  Prior functional level: Assistance with the following:, Housework, Shopping, Mobility, Cooking, Bathing  Current functional level: Assistance with

## 2025-02-18 NOTE — PROGRESS NOTES
Physical Therapy  Facility/Department: [facility] MED SURG  Daily Treatment Note  NAME: Thierry Lizama  : 1985  MRN: [id number]    Date of Service: 2025    Discharge Recommendations:  Continue to assess pending progress, Long Term Care with PT     Patient Diagnosis(es): The primary encounter diagnosis was Dehydration. Diagnoses of Viral syndrome and Urinary tract infection without hematuria, site unspecified were also pertinent to this visit.    Assessment  Assessment: Pt. noted to be impulsive with decreased safety awareness. Bed mobility: SBA. Transfers: CGA/SBa. Gait 15ftx1 with WW, CGA for safety. Pt. has short shuffled gait with decreased martina, no noted LOB but noted decreased safety awareness. Supine and seated exercises B LE x15 with AAROM to complete full ROM.  Activity Tolerance: Patient tolerated treatment well    Plan  Physical Therapy Plan  General Plan: 2 times a day 7 days a week  Current Treatment Recommendations: Strengthening;ROM;Balance training;Functional mobility training;Transfer training;ADL/Self-care training;Manual;Neuromuscular re-education;Stair training;Gait training;Endurance training;Home exercise program;Safety education & training;Patient/Caregiver education & training;Therapeutic activities;Positioning;Modalities    Restrictions  Restrictions/Precautions  Restrictions/Precautions: Fall Risk, Isolation  Required Braces or Orthoses?: No     Subjective   Subjective  Subjective: Pt. in bed upon arrival with guardian at bed side, agreeable to work with therapy.  Pain: denies at rest, note to complain of pain with R heel slides in bed. Does not rate pain    Objective  Bed Mobility Training  Bed Mobility Training: Yes  Overall Level of Assistance: Stand by assistance;Contact guard assistance  Interventions: Verbal cues  Rolling: Stand by assistance;Contact guard assistance  Supine to Sit: Stand by assistance;Contact guard assistance  Scooting: Stand by assistance;Contact guard

## 2025-02-18 NOTE — PROGRESS NOTES
Occupational Therapy  Facility/Department: [facility] MED SURG  Daily Treatment Note  NAME: Thierry Lizama  : 1985  MRN: [id number]    Date of Service: 2025    Discharge Recommendations:  Continue to assess pending progress         Patient Diagnosis(es): The primary encounter diagnosis was Dehydration. Diagnoses of Viral syndrome and Urinary tract infection without hematuria, site unspecified were also pertinent to this visit.     Assessment   Activity Tolerance: Patient tolerated treatment well  Discharge Recommendations: Continue to assess pending progress     Plan  Occupational Therapy Plan  Times Per Day: Once a day  Days Per Week: 7 Days  Current Treatment Recommendations: Strengthening;Balance training;Functional mobility training;Endurance training;Safety education & training;Patient/Caregiver education & training;Equipment evaluation, education, & procurement;Self-Care / ADL    Restrictions   Restrictions/Precautions  Restrictions/Precautions: Fall Risk, General Precautions    Subjective  Subjective  Subjective: pt seated in recliner with mom and RN present. pt agrred to ther ex and light ADLs  Pain: no c/o pain  Orientation  Overall Orientation Status: Within Functional Limits  Pain: denies at rest, note to complain of pain with R heel slides in bed. Does not rate pain          Objective  Vitals     ADL  Grooming: Minimal assistance  Grooming Skilled Clinical Factors: brushing teeth while seated in recliner  UE Bathing: Minimal assistance  UE Dressing: Minimal assistance  Product Used : Bath wipes  OT Exercises  Exercise Treatment: pt tolerated BUE ther ex with yellow flex bar x1 variation x8 reps befroe termination and red digi flex x15 reps x1 set to ease in completion of fxl tasks with increased vc's and demonstrations  to complete motions     Safety Devices  Type of Devices: All fall risk precautions in place;Left in chair;Chair alarm in place;Nurse notified;Call light within

## 2025-02-18 NOTE — PROGRESS NOTES
Physical Therapy  Facility/Department: [facility] MED SURG  Daily Treatment Note  NAME: Thierry Lizama  : 1985  MRN: [id number]    Date of Service: 2025    Discharge Recommendations:  Continue to assess pending progress, Long Term Care with PT        Patient Diagnosis(es): The primary encounter diagnosis was Dehydration. Diagnoses of Viral syndrome and Urinary tract infection without hematuria, site unspecified were also pertinent to this visit.    Assessment  Assessment: Pt. completed Seated exercises B LE x15 with AAROM to complete full ROM. Patient stated that his knees hurt during exercises. Pt. declines standing up from chair and walking. Therapist discussed the importance of getting up and moving around with patient still declining.  Activity Tolerance: Patient tolerated treatment well    Plan  Physical Therapy Plan  General Plan: 2 times a day 7 days a week  Current Treatment Recommendations: Strengthening;ROM;Balance training;Functional mobility training;Transfer training;ADL/Self-care training;Manual;Neuromuscular re-education;Stair training;Gait training;Endurance training;Home exercise program;Safety education & training;Patient/Caregiver education & training;Therapeutic activities;Positioning;Modalities    Restrictions  Restrictions/Precautions  Restrictions/Precautions: Fall Risk, Isolation  Required Braces or Orthoses?: No     Subjective   Subjective  Subjective: Pt. in chair upon arrival with guardian at bed side, agreeable to work with therapy.  Pain: denies    Objective  Bed Mobility Training  Bed Mobility Training: No  Overall Level of Assistance: Stand by assistance;Contact guard assistance  Interventions: Verbal cues  Rolling: Stand by assistance;Contact guard assistance  Supine to Sit: Stand by assistance;Contact guard assistance  Scooting: Stand by assistance;Contact guard assistance  Transfer Training  Transfer Training: No  Overall Level of Assistance: Contact guard

## 2025-02-18 NOTE — PLAN OF CARE
Problem: Discharge Planning  Goal: Discharge to home or other facility with appropriate resources  Outcome: Progressing  Flowsheets (Taken 2/17/2025 1827 by Carey Sherman RN)  Discharge to home or other facility with appropriate resources: Identify barriers to discharge with patient and caregiver     Problem: Safety - Adult  Goal: Free from fall injury  Outcome: Progressing

## 2025-02-18 NOTE — PROGRESS NOTES
[facility] called for an update. Writer spoke with nurse Chadwick and updated her on patient's condition.

## 2025-02-18 NOTE — PROGRESS NOTES
RESPIRATORY ASSESSMENT PROTOCOL                                                                                              Patient Name: Thierry Lizama Room#: 0315/0315-01 : 1985     Admitting diagnosis: Dehydration [E86.0]  Viral syndrome [B34.9]  Viral infection [B34.9]  Urinary tract infection without hematuria, site unspecified [N39.0]       Medical History:   Past Medical History:   Diagnosis Date    Aortic valve defect birth    Aortic valve stenosis     (unknown date of diagnosis, in med record from [facility])    Bipolar disorder (HCC)     (unknown date of diagnosis, in med record from [facility])    Chronic mental illness     Coarctation of aorta     (unknown date of diagnosis, in med record from [facility])    Depression     (unknown date of diagnosis, in med record from [facility])    Explosive personality disorder (HCC)     (unknown date of diagnosis, in med record from [facility])    Frostbite of both feet 2018    hospitalized at [facility] - required debridement of both feet    History of seizure disorder     \"had seizures when younger d/t abilify\" (med record from [facility])    Hypertension     (unknown date of diagnosis, in med record from [facility])    Influenza A 2025    Multifocal pneumonia 2025       PATIENT ASSESSMENT    LABORATORY DATA  Hematology:   Lab Results   Component Value Date/Time    WBC 6.6 2025 06:15 AM    RBC 4.18 2025 06:15 AM    HGB 12.2 2025 06:15 AM    HCT 36.9 2025 06:15 AM    PLT See Reflexed IPF Result 2025 06:15 AM     Chemistry:  No results found for: \"PHART\", \"FKS0ARZ\", \"PO2ART\", \"M5IEIOLX\", \"ZKL0AUF\", \"PBEA\", \"NBEA\"    VITALS  Pulse: 85   Respirations: 20  BP: (!) 125/54  SpO2: 100 % O2 Device: None (Room air)  Temp: 98 °F (36.7 °C)    SKIN COLOR  [x] Normal  [] Pale  [] Dusky  []

## 2025-02-18 NOTE — RT PROTOCOL NOTE
RESPIRATORY ASSESSMENT PROTOCOL                                                                                              Patient Name: Thierry Lizama Room#: 0315/0315-01 : 1985     Admitting diagnosis: Dehydration [E86.0]  Viral syndrome [B34.9]  Viral infection [B34.9]  Urinary tract infection without hematuria, site unspecified [N39.0]       Medical History:   Past Medical History:   Diagnosis Date    Aortic valve defect birth    Aortic valve stenosis     (unknown date of diagnosis, in med record from [facility])    Bipolar disorder (HCC)     (unknown date of diagnosis, in med record from [facility])    Chronic mental illness     Coarctation of aorta     (unknown date of diagnosis, in med record from [facility])    Depression     (unknown date of diagnosis, in med record from [facility])    Explosive personality disorder (HCC)     (unknown date of diagnosis, in med record from [facility])    Frostbite of both feet 2018    hospitalized at [facility] - required debridement of both feet    History of seizure disorder     \"had seizures when younger d/t abilify\" (med record from [facility])    Hypertension     (unknown date of diagnosis, in med record from [facility])       PATIENT ASSESSMENT    LABORATORY DATA  Hematology:   Lab Results   Component Value Date/Time    WBC 6.4 2025 10:05 AM    RBC 4.83 2025 10:05 AM    HGB 13.6 2025 10:05 AM    HCT 42.0 2025 10:05 AM    PLT See Reflexed IPF Result 2025 10:05 AM     Chemistry:  No results found for: \"PHART\", \"EKS7BKK\", \"PO2ART\", \"C7SEYWEK\", \"BHF6WSW\", \"PBEA\", \"NBEA\"    VITALS  Pulse: 99   Respirations: 18  BP: (!) 150/68  SpO2: 96 % O2 Device: None (Room air)  Temp: 98.7 °F (37.1 °C)    SKIN COLOR  [x] Normal  [] Pale  [] Dusky  [] Cyanotic    RESPIRATORY PATTERN  [x] Normal  [] Dyspnea  []

## 2025-02-18 NOTE — PROGRESS NOTES
[facility]  SPEECH THERAPY  [facility]  Bedside Swallow Examination    DIET ORDER RECOMMENDATIONS AFTER EVALUATION: Soft and Bite Size    Date: 2025  Patient Name: Thierry Lizama      CSN: [id number]   : 1985  (39 y.o.)  Gender: male   Referring Physician:  Rogelio Zhu MD   Diagnosis: Viral infection  Speech Diagnosis:     History of Present Illness/Injury:   Past Medical History:   Diagnosis Date    Aortic valve defect birth    Aortic valve stenosis     (unknown date of diagnosis, in med record from [facility])    Bipolar disorder (HCC)     (unknown date of diagnosis, in med record from [facility])    Chronic mental illness     Coarctation of aorta     (unknown date of diagnosis, in med record from [facility])    Depression     (unknown date of diagnosis, in med record from [facility])    Explosive personality disorder (HCC)     (unknown date of diagnosis, in med record from [facility])    Frostbite of both feet 2018    hospitalized at [facility] - required debridement of both feet    History of seizure disorder     \"had seizures when younger d/t abilify\" (med record from [facility])    Hypertension     (unknown date of diagnosis, in med record from [facility])       SUBJECTIVE:  Patient was in a pleasant mood. Mother was at bedside. He was laying down in bed and stated he had just had breakfast.     OBJECTIVE: Patient trialed thin liquid via straw as well as mildly thickened apple juice via straw. He was given cues to take smaller sips, but continued to take large sips, therefore, SLP had to take away straw cup after patient took smaller sips. Patient also trialed small pieces of ritz cheese sandwich crackers. He did not demonstrate signs/symptoms of penetrations/aspiration with crackers, but had delayed coughs for liquids. Patient had a strong wet cough

## 2025-02-18 NOTE — PROGRESS NOTES
Patient did have a strong cough after taking oral medications and a drink. Patient's legal guardian at bedside and states that he has always coughed like this and they have had many studies done in the past. States he does typically eat a regular diet with regular textured food.

## 2025-02-19 LAB
ANION GAP SERPL CALCULATED.3IONS-SCNC: 11 MMOL/L (ref 9–16)
BASOPHILS # BLD: 0.05 K/UL (ref 0–0.2)
BASOPHILS NFR BLD: 1 % (ref 0–2)
BUN SERPL-MCNC: 9 MG/DL (ref 6–20)
BUN/CREAT SERPL: 15 (ref 9–20)
CALCIUM SERPL-MCNC: 8.2 MG/DL (ref 8.6–10.4)
CHLORIDE SERPL-SCNC: 103 MMOL/L (ref 98–107)
CO2 SERPL-SCNC: 27 MMOL/L (ref 20–31)
CREAT SERPL-MCNC: 0.6 MG/DL (ref 0.7–1.2)
CRP SERPL HS-MCNC: 89.5 MG/L (ref 0–5)
EOSINOPHIL # BLD: <0.03 K/UL (ref 0–0.44)
EOSINOPHILS RELATIVE PERCENT: 0 % (ref 1–4)
ERYTHROCYTE [DISTWIDTH] IN BLOOD BY AUTOMATED COUNT: 14.7 % (ref 11.8–14.4)
GFR, ESTIMATED: >90 ML/MIN/1.73M2
GLUCOSE SERPL-MCNC: 103 MG/DL (ref 74–99)
HCT VFR BLD AUTO: 36 % (ref 40.7–50.3)
HGB BLD-MCNC: 11.6 G/DL (ref 13–17)
IMM GRANULOCYTES # BLD AUTO: 0.33 K/UL (ref 0–0.3)
IMM GRANULOCYTES NFR BLD: 5 %
LYMPHOCYTES NFR BLD: 1.4 K/UL (ref 1.1–3.7)
LYMPHOCYTES RELATIVE PERCENT: 20 % (ref 24–43)
MAGNESIUM SERPL-MCNC: 1.8 MG/DL (ref 1.6–2.6)
MCH RBC QN AUTO: 28.6 PG (ref 25.2–33.5)
MCHC RBC AUTO-ENTMCNC: 32.2 G/DL (ref 28.4–34.8)
MCV RBC AUTO: 88.7 FL (ref 82.6–102.9)
MONOCYTES NFR BLD: 0.89 K/UL (ref 0.1–1.2)
MONOCYTES NFR BLD: 13 % (ref 3–12)
NEUTROPHILS NFR BLD: 61 % (ref 36–65)
NEUTS SEG NFR BLD: 4.2 K/UL (ref 1.5–8.1)
NRBC BLD-RTO: 0 PER 100 WBC
PLATELET # BLD AUTO: 130 K/UL (ref 138–453)
PMV BLD AUTO: 10 FL (ref 8.1–13.5)
POTASSIUM SERPL-SCNC: 3.3 MMOL/L (ref 3.7–5.3)
RBC # BLD AUTO: 4.06 M/UL (ref 4.21–5.77)
SODIUM SERPL-SCNC: 141 MMOL/L (ref 136–145)
WBC OTHER # BLD: 6.9 K/UL (ref 3.5–11.3)

## 2025-02-19 PROCEDURE — 97535 SELF CARE MNGMENT TRAINING: CPT

## 2025-02-19 PROCEDURE — 80048 BASIC METABOLIC PNL TOTAL CA: CPT

## 2025-02-19 PROCEDURE — 85025 COMPLETE CBC W/AUTO DIFF WBC: CPT

## 2025-02-19 PROCEDURE — 6360000002 HC RX W HCPCS: Performed by: NURSE PRACTITIONER

## 2025-02-19 PROCEDURE — 86140 C-REACTIVE PROTEIN: CPT

## 2025-02-19 PROCEDURE — 6370000000 HC RX 637 (ALT 250 FOR IP): Performed by: STUDENT IN AN ORGANIZED HEALTH CARE EDUCATION/TRAINING PROGRAM

## 2025-02-19 PROCEDURE — 94640 AIRWAY INHALATION TREATMENT: CPT

## 2025-02-19 PROCEDURE — 1200000000 HC SEMI PRIVATE

## 2025-02-19 PROCEDURE — 94669 MECHANICAL CHEST WALL OSCILL: CPT

## 2025-02-19 PROCEDURE — 97116 GAIT TRAINING THERAPY: CPT

## 2025-02-19 PROCEDURE — 36415 COLL VENOUS BLD VENIPUNCTURE: CPT

## 2025-02-19 PROCEDURE — 97110 THERAPEUTIC EXERCISES: CPT

## 2025-02-19 PROCEDURE — 92526 ORAL FUNCTION THERAPY: CPT

## 2025-02-19 PROCEDURE — 2500000003 HC RX 250 WO HCPCS: Performed by: NURSE PRACTITIONER

## 2025-02-19 PROCEDURE — 94761 N-INVAS EAR/PLS OXIMETRY MLT: CPT

## 2025-02-19 PROCEDURE — 6370000000 HC RX 637 (ALT 250 FOR IP): Performed by: NURSE PRACTITIONER

## 2025-02-19 PROCEDURE — 83735 ASSAY OF MAGNESIUM: CPT

## 2025-02-19 RX ADMIN — BUSPIRONE HYDROCHLORIDE 30 MG: 10 TABLET ORAL at 08:14

## 2025-02-19 RX ADMIN — IPRATROPIUM BROMIDE AND ALBUTEROL SULFATE 1 DOSE: .5; 2.5 SOLUTION RESPIRATORY (INHALATION) at 04:43

## 2025-02-19 RX ADMIN — QUETIAPINE FUMARATE 200 MG: 100 TABLET ORAL at 16:19

## 2025-02-19 RX ADMIN — SODIUM CHLORIDE, PRESERVATIVE FREE 10 ML: 5 INJECTION INTRAVENOUS at 08:13

## 2025-02-19 RX ADMIN — OXCARBAZEPINE 600 MG: 300 TABLET, FILM COATED ORAL at 20:15

## 2025-02-19 RX ADMIN — BACITRACIN ZINC: 500 OINTMENT TOPICAL at 20:20

## 2025-02-19 RX ADMIN — IPRATROPIUM BROMIDE AND ALBUTEROL SULFATE 1 DOSE: .5; 2.5 SOLUTION RESPIRATORY (INHALATION) at 16:53

## 2025-02-19 RX ADMIN — VALPROIC ACID 625 MG: 250 SOLUTION ORAL at 08:13

## 2025-02-19 RX ADMIN — METOPROLOL TARTRATE 25 MG: 25 TABLET, FILM COATED ORAL at 20:15

## 2025-02-19 RX ADMIN — DOXYCYCLINE HYCLATE 100 MG: 100 CAPSULE ORAL at 08:14

## 2025-02-19 RX ADMIN — DOXYCYCLINE HYCLATE 100 MG: 100 CAPSULE ORAL at 20:15

## 2025-02-19 RX ADMIN — BACITRACIN ZINC: 500 OINTMENT TOPICAL at 08:28

## 2025-02-19 RX ADMIN — OXCARBAZEPINE 150 MG: 300 TABLET, FILM COATED ORAL at 08:13

## 2025-02-19 RX ADMIN — BUSPIRONE HYDROCHLORIDE 30 MG: 10 TABLET ORAL at 20:15

## 2025-02-19 RX ADMIN — GUAIFENESIN, DEXTROMETHORPHAN HBR 1 TABLET: 600; 30 TABLET ORAL at 08:14

## 2025-02-19 RX ADMIN — FAMOTIDINE 20 MG: 20 TABLET, FILM COATED ORAL at 20:16

## 2025-02-19 RX ADMIN — IPRATROPIUM BROMIDE AND ALBUTEROL SULFATE 1 DOSE: .5; 2.5 SOLUTION RESPIRATORY (INHALATION) at 00:47

## 2025-02-19 RX ADMIN — CETIRIZINE HYDROCHLORIDE 10 MG: 10 TABLET, FILM COATED ORAL at 08:14

## 2025-02-19 RX ADMIN — CLONIDINE HYDROCHLORIDE 0.1 MG: 0.1 TABLET ORAL at 08:14

## 2025-02-19 RX ADMIN — FAMOTIDINE 20 MG: 20 TABLET, FILM COATED ORAL at 08:14

## 2025-02-19 RX ADMIN — VALPROIC ACID 1000 MG: 250 SOLUTION ORAL at 20:16

## 2025-02-19 RX ADMIN — QUETIAPINE FUMARATE 200 MG: 100 TABLET ORAL at 08:14

## 2025-02-19 RX ADMIN — GUAIFENESIN, DEXTROMETHORPHAN HBR 1 TABLET: 600; 30 TABLET ORAL at 20:15

## 2025-02-19 RX ADMIN — IPRATROPIUM BROMIDE AND ALBUTEROL SULFATE 1 DOSE: .5; 2.5 SOLUTION RESPIRATORY (INHALATION) at 13:12

## 2025-02-19 RX ADMIN — OXCARBAZEPINE 600 MG: 300 TABLET, FILM COATED ORAL at 11:31

## 2025-02-19 RX ADMIN — QUETIAPINE FUMARATE 400 MG: 100 TABLET ORAL at 20:15

## 2025-02-19 RX ADMIN — SODIUM CHLORIDE, PRESERVATIVE FREE 10 ML: 5 INJECTION INTRAVENOUS at 20:21

## 2025-02-19 RX ADMIN — IPRATROPIUM BROMIDE AND ALBUTEROL SULFATE 1 DOSE: .5; 2.5 SOLUTION RESPIRATORY (INHALATION) at 09:12

## 2025-02-19 RX ADMIN — CLONIDINE HYDROCHLORIDE 0.1 MG: 0.1 TABLET ORAL at 20:15

## 2025-02-19 RX ADMIN — ENALAPRIL MALEATE 20 MG: 10 TABLET ORAL at 08:14

## 2025-02-19 RX ADMIN — POTASSIUM BICARBONATE 40 MEQ: 782 TABLET, EFFERVESCENT ORAL at 08:27

## 2025-02-19 RX ADMIN — OSELTAMIVIR PHOSPHATE 75 MG: 75 CAPSULE ORAL at 08:14

## 2025-02-19 RX ADMIN — ENOXAPARIN SODIUM 40 MG: 100 INJECTION SUBCUTANEOUS at 08:14

## 2025-02-19 RX ADMIN — METOPROLOL TARTRATE 25 MG: 25 TABLET, FILM COATED ORAL at 08:14

## 2025-02-19 RX ADMIN — OSELTAMIVIR PHOSPHATE 75 MG: 75 CAPSULE ORAL at 20:15

## 2025-02-19 RX ADMIN — WATER 1000 MG: 1 INJECTION INTRAMUSCULAR; INTRAVENOUS; SUBCUTANEOUS at 08:12

## 2025-02-19 RX ADMIN — IPRATROPIUM BROMIDE AND ALBUTEROL SULFATE 1 DOSE: .5; 2.5 SOLUTION RESPIRATORY (INHALATION) at 20:37

## 2025-02-19 RX ADMIN — OXCARBAZEPINE 600 MG: 300 TABLET, FILM COATED ORAL at 08:15

## 2025-02-19 ASSESSMENT — PAIN SCALES - GENERAL: PAINLEVEL_OUTOF10: 0

## 2025-02-19 NOTE — PROGRESS NOTES
Progress Note    SUBJECTIVE:    Patient seen for f/u of Multifocal pneumonia.  He resting on couch no distress. Continues with cough.  Visitor at side, she reported he is feeling better and has been very active.        ROS:   Constitutional: negative  for fevers, and negative for chills.  Respiratory: negative for shortness of breath, positive for cough, and negative for wheezing  Cardiovascular: negative for chest pain, and negative for palpitations  Gastrointestinal: negative for abdominal pain, negative for nausea,negative for vomiting, negative for diarrhea, and negative for constipation     All other systems were reviewed with the patient and are negative unless otherwise stated in HPI      OBJECTIVE:      Vitals:   Vitals:    02/19/25 0443   BP:    Pulse: 89   Resp: (!) 32   Temp:    SpO2: 93%     Weight - Scale: 99.4 kg (219 lb 3.2 oz)   Height: 162.6 cm (5' 4\")     Weight  Wt Readings from Last 3 Encounters:   02/19/25 99.4 kg (219 lb 3.2 oz)   02/16/25 103.4 kg (228 lb)   12/04/24 102.9 kg (226 lb 13.7 oz)     Body mass index is 37.63 kg/m².    24HR INTAKE/OUTPUT:      Intake/Output Summary (Last 24 hours) at 2/19/2025 0636  Last data filed at 2/18/2025 1635  Gross per 24 hour   Intake 960 ml   Output 400 ml   Net 560 ml     -----------------------------------------------------------------  Exam:    GEN:    Awake, alert and cooperative and pleasant.  Hoarse voice.   EYES:  EOMI, pupils equal   NECK: Supple. No lymphadenopathy.  No carotid bruit  CVS:    regular rate and rhythm, no audible murmur  PULM:  diminished but clear without wheezing, rales or rhonchi, no acute respiratory distress  ABD:    Bowels sounds normal.  Abdomen is soft.  No distention.  no tenderness to palpation.   EXT:   no edema bilaterally .  No calf tenderness.   NEURO: Moves all extremities.  Motor and sensory are grossly intact  SKIN:  No rashes.  No skin lesions.

## 2025-02-19 NOTE — PROGRESS NOTES
[facility]  INPATIENT SPEECH THERAPY  [facility] MED SURG  Dysphagia Treatment      Date: 2025  Patient Name: Thierry Lizama      CSN: [id number]   : 1985  (39 y.o.)  Gender: male   Referring Physician:  Dr. Zhu  Diagnosis: Viral infection   Precautions: Fall precautions  Current Diet: Soft and bite sized solids, thin liquids  Respiratory Status: Room Air  Swallowing Strategies: Standard Universal Swallow Precautions  Date of Last MBS/FEES:  2021.    Pain:  No pain reported.    Subjective:  Pt seen for dysphagia tx seated upright in chair with his mother at bedside. Pt seen with soft and bite sized breakfast tray this date. Pt noted to have strong, dry cough upon SLP entry to room and coughing was noted prior to beginning PO intake. Mother reports pt has a persistent cough at baseline d/t underlying heart conditions. Pt presents from [facility] and is noted to be able to follow basic 1-step directions.     Short-Term Goals:  Short-Term Goal Timeframe: 7 Days    SHORT TERM GOAL #1:  Goal 1: Patient will follow compensatory strategies during mealtimes.  INTERVENTIONS: Pt was able to consume breakfast meal utilizing a slow eating pace independently. Pt demonstrated good ability to independently alternate solids and liquids. Pt required mod-max verbal and visual cueing to take small bites and small sips. Without verbal cueing, pt was noted to take large bites and chug thin liquids via straw.     SHORT TERM GOAL #2:  Goal 2: Patient will tolerate diet with minimal signs of penetration/aspiration.  INTERVENTIONS: Pt completed trials of soft and bite-sized solids, regular solids, and bite-sized solids. Pt was noted to demonstrate strong cough prior to beginning PO trials. Pt tolerated trials well with no pocketing or oral residues remaining post-swallow. Prolonged mastication noted on regular solid trials as well as large bite-size. Pt completed trials of thin liquids via

## 2025-02-19 NOTE — PROGRESS NOTES
Physical Therapy  Facility/Department: [facility] MED SURG  Daily Treatment Note  NAME: Thierry Lizama  : 1985  MRN: [id number]    Date of Service: 2025    Discharge Recommendations:  Continue to assess pending progress, Long Term Care with PT     Patient Diagnosis(es): The primary encounter diagnosis was Dehydration. Diagnoses of Viral syndrome and Urinary tract infection without hematuria, site unspecified were also pertinent to this visit.    Assessment  Assessment: Bed mobility: SBA. Transfers: SBA. Pt ambulated for 75 ft x1 with RW and CGA with cues for technique with RW. Supine B LE exercises x15 reps with consistent tactile and verbal cues.  Activity Tolerance: Patient limited by fatigue    Plan  Physical Therapy Plan  General Plan: 2 times a day 7 days a week  Specific Instructions for Next Treatment: 1x/ day on weekends  Current Treatment Recommendations: Strengthening;ROM;Balance training;Functional mobility training;Transfer training;ADL/Self-care training;Manual;Neuromuscular re-education;Stair training;Gait training;Endurance training;Home exercise program;Safety education & training;Patient/Caregiver education & training;Therapeutic activities;Positioning;Modalities    Restrictions  Restrictions/Precautions  Restrictions/Precautions: Fall Risk, Isolation  Required Braces or Orthoses?: No     Subjective   Subjective  Subjective: Pt. in chair upon arrival with mother at bed side, agreeable to work with therapy.  Pain: denies    Objective  Bed Mobility Training  Bed Mobility Training: Yes  Overall Level of Assistance: Stand by assistance  Rolling: Stand by assistance  Supine to Sit: Stand by assistance  Sit to Supine: Stand by assistance  Scooting: Stand by assistance  Transfer Training  Transfer Training: Yes  Overall Level of Assistance: Stand by assistance  Interventions: Verbal cues  Sit to Stand: Stand by assistance  Stand to Sit: Stand by assistance  Gait  Gait Training: Yes  Overall

## 2025-02-19 NOTE — PROGRESS NOTES
Physical Therapy  Facility/Department: [facility] MED SURG  Daily Treatment Note  NAME: Thierry Lizama  : 1985  MRN: [id number]    Date of Service: 2025    Discharge Recommendations:  Continue to assess pending progress, Long Term Care with PT     Patient Diagnosis(es): The primary encounter diagnosis was Dehydration. Diagnoses of Viral syndrome and Urinary tract infection without hematuria, site unspecified were also pertinent to this visit.    Assessment  Assessment: Transfers: SBA. Pt ambulated for 115 ft x1 with RW and CGA with no LOB. Pt completes seated B LE exercises x15 reps with constant demo and verbal cues. Provided pt with ice pack for R knee d/t reporting increased pain from movement after his recent fall.  Activity Tolerance: Patient tolerated treatment well    Plan  Physical Therapy Plan  General Plan: 2 times a day 7 days a week  Specific Instructions for Next Treatment: 1x/ day on weekends  Current Treatment Recommendations: Strengthening;ROM;Balance training;Functional mobility training;Transfer training;ADL/Self-care training;Manual;Neuromuscular re-education;Stair training;Gait training;Endurance training;Home exercise program;Safety education & training;Patient/Caregiver education & training;Therapeutic activities;Positioning;Modalities    Restrictions  Restrictions/Precautions  Restrictions/Precautions: Fall Risk, Isolation  Required Braces or Orthoses?: No     Subjective   Subjective  Subjective: Pt. in chair upon arrival with mother at bed side, agreeable to work with therapy. Pt is more awake than this AM.  Pain: denies    Objective  Bed Mobility Training  Bed Mobility Training: No  Overall Level of Assistance: Stand by assistance  Rolling: Stand by assistance  Supine to Sit: Stand by assistance  Sit to Supine: Stand by assistance  Scooting: Stand by assistance  Transfer Training  Transfer Training: Yes  Overall Level of Assistance: Stand by assistance  Interventions: Verbal

## 2025-02-19 NOTE — PROGRESS NOTES
Occupational Therapy  Facility/Department: [facility] MED SURG  Daily Treatment Note  NAME: Thierry Lizama  : 1985  MRN: [id number]    Date of Service: 2025    Discharge Recommendations:  Continue to assess pending progress         Patient Diagnosis(es): The primary encounter diagnosis was Dehydration. Diagnoses of Viral syndrome and Urinary tract infection without hematuria, site unspecified were also pertinent to this visit.     Assessment   Activity Tolerance: Patient tolerated treatment well  Discharge Recommendations: Continue to assess pending progress     Plan  Occupational Therapy Plan  Times Per Day: Once a day  Days Per Week: 7 Days  Current Treatment Recommendations: Strengthening;Balance training;Functional mobility training;Endurance training;Safety education & training;Patient/Caregiver education & training;Equipment evaluation, education, & procurement;Self-Care / ADL    Restrictions   Contact, droplet, fall risk    Subjective  Subjective  Subjective: Pt walking in room with no gripper socks on. Pt was seated and socks placed on feet. Pt agreed to self care at sink.  Pain: denied, no indication          Objective  Vitals    ADL  Grooming: Setup;Stand by assistance;Verbal cueing  Grooming Skilled Clinical Factors: oral hygiene  UE Bathing: Stand by assistance;Setup;Verbal cueing  UE Bathing Skilled Clinical Factors: standing at sink  LE Bathing: Setup;Stand by assistance;Verbal cueing  LE Bathing Skilled Clinical Factors: standing at sink  UE Dressing: Minimal assistance  LE Dressing: Moderate assistance  Putting On/Taking Off Footwear: Maximum assistance  Toileting Skilled Clinical Factors: sis not assess  Functional Mobility: Stand by assistance;Contact guard assistance  Functional Mobility Skilled Clinical Factors: no AD  Additional Comments: SBA/CGA sit<>stands. Pt stood at sink for TB self care and grooming SBA with Max VCs for completion of tasks.  Product Used : Soap and water

## 2025-02-19 NOTE — RT PROTOCOL NOTE
RESPIRATORY ASSESSMENT PROTOCOL                                                                                              Patient Name: Thierry Lizama Room#: 0315/0315-01 : 1985     Admitting diagnosis: Dehydration [E86.0]  Viral syndrome [B34.9]  Viral infection [B34.9]  Urinary tract infection without hematuria, site unspecified [N39.0]       Medical History:   Past Medical History:   Diagnosis Date    Aortic valve defect birth    Aortic valve stenosis     (unknown date of diagnosis, in med record from [facility])    Bipolar disorder (HCC)     (unknown date of diagnosis, in med record from [facility])    Chronic mental illness     Coarctation of aorta     (unknown date of diagnosis, in med record from [facility])    Depression     (unknown date of diagnosis, in med record from [facility])    Explosive personality disorder (HCC)     (unknown date of diagnosis, in med record from [facility])    Frostbite of both feet 2018    hospitalized at [facility] - required debridement of both feet    History of seizure disorder     \"had seizures when younger d/t abilify\" (med record from [facility])    Hypertension     (unknown date of diagnosis, in med record from [facility])    Influenza A 2025    Multifocal pneumonia 2025       PATIENT ASSESSMENT    LABORATORY DATA  Hematology:   Lab Results   Component Value Date/Time    WBC 6.9 2025 05:10 AM    RBC 4.06 2025 05:10 AM    HGB 11.6 2025 05:10 AM    HCT 36.0 2025 05:10 AM     2025 05:10 AM     Chemistry:  No results found for: \"PHART\", \"LDV4ZDI\", \"PO2ART\", \"O8QGEBDY\", \"WOH0ONR\", \"PBEA\", \"NBEA\"    VITALS  Pulse: 88   Respirations: 26  BP: (!) 151/56  SpO2: 96 % O2 Device: None (Room air)  Temp: 98.3 °F (36.8 °C)    SKIN COLOR  [x] Normal  [] Pale  [] Dusky  [] Cyanotic    RESPIRATORY

## 2025-02-19 NOTE — PROGRESS NOTES
Shift assessment and vitals obtained at this time as charted. Blood pressure is slightly elevated, vitals otherwise WNL. Patient denies pain. Patient is alert and oriented to person and place. Respirations even and unlabored at rest but with frequent cough noted. Trace, non-pitting edema noted to BLE. Assessment otherwise as charted, see flowsheets. Morning medications also given at this time, as well as 40 mEq of PO potassium per protocol for potassium level of 3.3. Patient is resting in the chair with call light in reach and mother at bedside, patient denies other needs at this time. Care ongoing.

## 2025-02-19 NOTE — PROGRESS NOTES
Pt alert and oriented to self and place resting comfortably in bed at this time. Vitals and assessment completed as charted. Pt denies any pain, pt and mother denies current needs at this time. Call light is within reach, bed alarm on. Care ongoing.

## 2025-02-19 NOTE — PLAN OF CARE
Problem: Discharge Planning  Goal: Discharge to home or other facility with appropriate resources  Flowsheets (Taken 2/17/2025 1827 by Carey Sherman, RN)  Discharge to home or other facility with appropriate resources: Identify barriers to discharge with patient and caregiver     Problem: Safety - Adult  Goal: Free from fall injury  Flowsheets (Taken 2/18/2025 2312)  Free From Fall Injury: Instruct family/caregiver on patient safety     Problem: Nutrition Deficit:  Goal: Optimize nutritional status  Flowsheets (Taken 2/18/2025 0904 by Aries Min, RD, LD)  Nutrient intake appropriate for improving, restoring, or maintaining nutritional needs:   Monitor oral intake, labs, and treatment plans   Recommend appropriate diets, oral nutritional supplements, and vitamin/mineral supplements

## 2025-02-19 NOTE — PLAN OF CARE
Problem: Discharge Planning  Goal: Discharge to home or other facility with appropriate resources  2/19/2025 0939 by Shellie Celeste, RN  Outcome: Progressing     Problem: Safety - Adult  Goal: Free from fall injury  2/19/2025 0939 by Shellie Celeste, RN  Outcome: Progressing     Problem: Nutrition Deficit:  Goal: Optimize nutritional status  2/19/2025 0939 by Shellie Celeste, RN  Outcome: Progressing     Problem: ABCDS Injury Assessment  Goal: Absence of physical injury  Outcome: Progressing

## 2025-02-20 ENCOUNTER — APPOINTMENT (OUTPATIENT)
Dept: GENERAL RADIOLOGY | Age: 40
DRG: 195 | End: 2025-02-20
Payer: MEDICARE

## 2025-02-20 VITALS
HEIGHT: 64 IN | HEART RATE: 90 BPM | BODY MASS INDEX: 37.22 KG/M2 | OXYGEN SATURATION: 93 % | SYSTOLIC BLOOD PRESSURE: 137 MMHG | DIASTOLIC BLOOD PRESSURE: 90 MMHG | RESPIRATION RATE: 21 BRPM | TEMPERATURE: 98.2 F | WEIGHT: 218 LBS

## 2025-02-20 LAB
ANION GAP SERPL CALCULATED.3IONS-SCNC: 9 MMOL/L (ref 9–16)
BASOPHILS # BLD: 0 K/UL (ref 0–0.2)
BASOPHILS NFR BLD: 0 % (ref 0–2)
BUN SERPL-MCNC: 9 MG/DL (ref 6–20)
BUN/CREAT SERPL: 13 (ref 9–20)
CALCIUM SERPL-MCNC: 8.4 MG/DL (ref 8.6–10.4)
CHLORIDE SERPL-SCNC: 104 MMOL/L (ref 98–107)
CO2 SERPL-SCNC: 29 MMOL/L (ref 20–31)
CREAT SERPL-MCNC: 0.7 MG/DL (ref 0.7–1.2)
EOSINOPHIL # BLD: 0 K/UL (ref 0–0.44)
EOSINOPHILS RELATIVE PERCENT: 0 % (ref 1–4)
ERYTHROCYTE [DISTWIDTH] IN BLOOD BY AUTOMATED COUNT: 14.6 % (ref 11.8–14.4)
GFR, ESTIMATED: >90 ML/MIN/1.73M2
GLUCOSE SERPL-MCNC: 106 MG/DL (ref 74–99)
HCT VFR BLD AUTO: 36.1 % (ref 40.7–50.3)
HGB BLD-MCNC: 11.7 G/DL (ref 13–17)
IMM GRANULOCYTES # BLD AUTO: 0.5 K/UL (ref 0–0.3)
IMM GRANULOCYTES NFR BLD: 9 %
LYMPHOCYTES NFR BLD: 1.68 K/UL (ref 1.1–3.7)
LYMPHOCYTES RELATIVE PERCENT: 30 % (ref 24–43)
MAGNESIUM SERPL-MCNC: 1.7 MG/DL (ref 1.6–2.6)
MCH RBC QN AUTO: 28.9 PG (ref 25.2–33.5)
MCHC RBC AUTO-ENTMCNC: 32.4 G/DL (ref 28.4–34.8)
MCV RBC AUTO: 89.1 FL (ref 82.6–102.9)
MONOCYTES NFR BLD: 0.78 K/UL (ref 0.1–1.2)
MONOCYTES NFR BLD: 14 % (ref 3–12)
MORPHOLOGY: NORMAL
NEUTROPHILS NFR BLD: 47 % (ref 36–65)
NEUTS SEG NFR BLD: 2.64 K/UL (ref 1.5–8.1)
NRBC BLD-RTO: 0 PER 100 WBC
PLATELET # BLD AUTO: 156 K/UL (ref 138–453)
PMV BLD AUTO: 10 FL (ref 8.1–13.5)
POTASSIUM SERPL-SCNC: 3.2 MMOL/L (ref 3.7–5.3)
RBC # BLD AUTO: 4.05 M/UL (ref 4.21–5.77)
SODIUM SERPL-SCNC: 142 MMOL/L (ref 136–145)
WBC OTHER # BLD: 5.6 K/UL (ref 3.5–11.3)

## 2025-02-20 PROCEDURE — 92526 ORAL FUNCTION THERAPY: CPT

## 2025-02-20 PROCEDURE — 97116 GAIT TRAINING THERAPY: CPT

## 2025-02-20 PROCEDURE — 94761 N-INVAS EAR/PLS OXIMETRY MLT: CPT

## 2025-02-20 PROCEDURE — 83735 ASSAY OF MAGNESIUM: CPT

## 2025-02-20 PROCEDURE — 85025 COMPLETE CBC W/AUTO DIFF WBC: CPT

## 2025-02-20 PROCEDURE — 97110 THERAPEUTIC EXERCISES: CPT

## 2025-02-20 PROCEDURE — 71046 X-RAY EXAM CHEST 2 VIEWS: CPT

## 2025-02-20 PROCEDURE — 2500000003 HC RX 250 WO HCPCS: Performed by: NURSE PRACTITIONER

## 2025-02-20 PROCEDURE — 6370000000 HC RX 637 (ALT 250 FOR IP): Performed by: NURSE PRACTITIONER

## 2025-02-20 PROCEDURE — 94640 AIRWAY INHALATION TREATMENT: CPT

## 2025-02-20 PROCEDURE — 94669 MECHANICAL CHEST WALL OSCILL: CPT

## 2025-02-20 PROCEDURE — 6360000002 HC RX W HCPCS: Performed by: NURSE PRACTITIONER

## 2025-02-20 PROCEDURE — 6370000000 HC RX 637 (ALT 250 FOR IP): Performed by: STUDENT IN AN ORGANIZED HEALTH CARE EDUCATION/TRAINING PROGRAM

## 2025-02-20 PROCEDURE — 80048 BASIC METABOLIC PNL TOTAL CA: CPT

## 2025-02-20 RX ORDER — POTASSIUM CHLORIDE 1500 MG/1
20 TABLET, EXTENDED RELEASE ORAL ONCE
Status: COMPLETED | OUTPATIENT
Start: 2025-02-20 | End: 2025-02-20

## 2025-02-20 RX ORDER — POTASSIUM CHLORIDE 1500 MG/1
40 TABLET, EXTENDED RELEASE ORAL ONCE
Status: COMPLETED | OUTPATIENT
Start: 2025-02-20 | End: 2025-02-20

## 2025-02-20 RX ORDER — BENZONATATE 100 MG/1
100 CAPSULE ORAL 3 TIMES DAILY PRN
DISCHARGE
Start: 2025-02-20 | End: 2025-02-27

## 2025-02-20 RX ORDER — DOXYCYCLINE 100 MG/1
100 CAPSULE ORAL EVERY 12 HOURS SCHEDULED
Qty: 7 CAPSULE | Refills: 0 | Status: SHIPPED | OUTPATIENT
Start: 2025-02-20 | End: 2025-02-24

## 2025-02-20 RX ORDER — OSELTAMIVIR PHOSPHATE 75 MG/1
75 CAPSULE ORAL 2 TIMES DAILY
DISCHARGE
Start: 2025-02-20 | End: 2025-02-23

## 2025-02-20 RX ADMIN — ENALAPRIL MALEATE 20 MG: 10 TABLET ORAL at 09:01

## 2025-02-20 RX ADMIN — BUSPIRONE HYDROCHLORIDE 30 MG: 10 TABLET ORAL at 09:00

## 2025-02-20 RX ADMIN — BACITRACIN ZINC: 500 OINTMENT TOPICAL at 08:58

## 2025-02-20 RX ADMIN — OXCARBAZEPINE 600 MG: 300 TABLET, FILM COATED ORAL at 07:09

## 2025-02-20 RX ADMIN — CETIRIZINE HYDROCHLORIDE 10 MG: 10 TABLET, FILM COATED ORAL at 09:01

## 2025-02-20 RX ADMIN — OXCARBAZEPINE 150 MG: 300 TABLET, FILM COATED ORAL at 07:09

## 2025-02-20 RX ADMIN — POTASSIUM CHLORIDE 20 MEQ: 1500 TABLET, EXTENDED RELEASE ORAL at 10:21

## 2025-02-20 RX ADMIN — VALPROIC ACID 625 MG: 250 SOLUTION ORAL at 08:58

## 2025-02-20 RX ADMIN — OXCARBAZEPINE 600 MG: 300 TABLET, FILM COATED ORAL at 11:38

## 2025-02-20 RX ADMIN — METOPROLOL TARTRATE 25 MG: 25 TABLET, FILM COATED ORAL at 09:01

## 2025-02-20 RX ADMIN — ENOXAPARIN SODIUM 40 MG: 100 INJECTION SUBCUTANEOUS at 09:01

## 2025-02-20 RX ADMIN — IPRATROPIUM BROMIDE AND ALBUTEROL SULFATE 1 DOSE: .5; 2.5 SOLUTION RESPIRATORY (INHALATION) at 11:30

## 2025-02-20 RX ADMIN — FAMOTIDINE 20 MG: 20 TABLET, FILM COATED ORAL at 09:01

## 2025-02-20 RX ADMIN — GUAIFENESIN, DEXTROMETHORPHAN HBR 1 TABLET: 600; 30 TABLET ORAL at 09:00

## 2025-02-20 RX ADMIN — CLONIDINE HYDROCHLORIDE 0.1 MG: 0.1 TABLET ORAL at 09:00

## 2025-02-20 RX ADMIN — IPRATROPIUM BROMIDE AND ALBUTEROL SULFATE 1 DOSE: .5; 2.5 SOLUTION RESPIRATORY (INHALATION) at 04:41

## 2025-02-20 RX ADMIN — IPRATROPIUM BROMIDE AND ALBUTEROL SULFATE 1 DOSE: .5; 2.5 SOLUTION RESPIRATORY (INHALATION) at 00:13

## 2025-02-20 RX ADMIN — DOXYCYCLINE HYCLATE 100 MG: 100 CAPSULE ORAL at 09:00

## 2025-02-20 RX ADMIN — SKIN PROTECTANT: 33 OINTMENT TOPICAL at 08:58

## 2025-02-20 RX ADMIN — SODIUM CHLORIDE, PRESERVATIVE FREE 10 ML: 5 INJECTION INTRAVENOUS at 07:15

## 2025-02-20 RX ADMIN — OSELTAMIVIR PHOSPHATE 75 MG: 75 CAPSULE ORAL at 09:01

## 2025-02-20 RX ADMIN — POTASSIUM CHLORIDE 40 MEQ: 1500 TABLET, EXTENDED RELEASE ORAL at 07:09

## 2025-02-20 RX ADMIN — QUETIAPINE FUMARATE 200 MG: 100 TABLET ORAL at 07:09

## 2025-02-20 RX ADMIN — WATER 1000 MG: 1 INJECTION INTRAMUSCULAR; INTRAVENOUS; SUBCUTANEOUS at 07:08

## 2025-02-20 RX ADMIN — ACETAMINOPHEN 650 MG: 325 TABLET ORAL at 12:22

## 2025-02-20 ASSESSMENT — PAIN DESCRIPTION - ORIENTATION: ORIENTATION: RIGHT

## 2025-02-20 ASSESSMENT — PAIN - FUNCTIONAL ASSESSMENT: PAIN_FUNCTIONAL_ASSESSMENT: ACTIVITIES ARE NOT PREVENTED

## 2025-02-20 ASSESSMENT — PAIN DESCRIPTION - LOCATION: LOCATION: HEAD;KNEE

## 2025-02-20 ASSESSMENT — PAIN DESCRIPTION - DESCRIPTORS: DESCRIPTORS: ACHING

## 2025-02-20 ASSESSMENT — PAIN SCALES - GENERAL: PAINLEVEL_OUTOF10: 4

## 2025-02-20 NOTE — PLAN OF CARE
Problem: Discharge Planning  Goal: Discharge to home or other facility with appropriate resources  2/20/2025 1132 by Tana Gonzales, RN  Outcome: Completed     Problem: Safety - Adult  Goal: Free from fall injury  2/20/2025 1132 by Tana Gonzales, RN  Outcome: Completed     Problem: Nutrition Deficit:  Goal: Optimize nutritional status  2/20/2025 1132 by Tana Gonzales, RN  Outcome: Completed     Problem: ABCDS Injury Assessment  Goal: Absence of physical injury  2/20/2025 1132 by Tana Gonzales, RN  Outcome: Completed      [facility] ambulatory encounter  FAMILY PRACTICE OFFICE VISIT    CHIEF COMPLAINT:    Chief Complaint   Patient presents with   • Headache     x1.5   • fatigue   • Generalized Body Aches     x4 wks.       SUBJECTIVE:  Cora Dodd is a 33 year old female who presents for evaluation of fatigue, weight gain generalized aches and joint pain that she has been having since 1 month. She denies being depressed, denies having stress related fatigue.   She is a PA student graduating in 3 months. Though she has some family related stress she does not think it is bad enough to cause her fatigue. She eats well and does not have allergies that would cause her fatigue. So she is here to get checked out.  She is not on any BCP    Review of systems:   All other systems are reviewed and are negative except as documented in the history of present illness.     OBJECTIVE:  PROBLEM LIST:   Patient Active Problem List   Diagnosis   • Nevus, atypical   • MRSA cellulitis   • Temporomandibular joint disorders, unspecified   • Allergic rhinitis   • Gastroesophageal reflux disease   • Pelvic pain in female   • Eczema, dyshidrotic   • Pain in toe of right foot       PAST HISTORIES:   I have reviewed the past medical history, family history, social history, medications and allergies listed in the medical record as obtained by my nursing staff and support staff and agree with their documentation.  ALLERGIES:   Allergen Reactions   • Tetracycline RASH     Current Outpatient Prescriptions   Medication Sig Dispense Refill   • pantoprazole (PROTONIX) 40 MG tablet TAKE 1 TABLET BY MOUTH EVERY DAY(30 TO 60 MINUTES PRIOR TO BREAKFAST) 90 tablet 3   • fluticasone (FLONASE) 50 MCG/ACT nasal spray Spray 2 sprays in each nostril daily. 3 Bottle 4   • loratadine (CLARITIN) 10 MG tablet Take 1 tablet by mouth daily. 90 tablet 4   • montelukast (SINGULAIR) 10 MG tablet Take 10 mg by mouth nightly.     • betamethasone valerate (VALISONE) 0.1 % ointment Apply to  eczema on feet twice daily as needed 45 g 5   • tiZANidine (ZANAFLEX) 2 MG tablet Take 1-2 tablets by mouth every 6 hours as needed for Muscle spasms. 90 tablet 5     No current facility-administered medications for this visit.      Immunization History   Administered Date(s) Administered   • DTaP 05/21/1987, 09/21/1987, 07/30/1990, 05/14/1991   • Hepatitis A - Adult 03/24/2008   • Hepatitis B Adolescent 07/29/1997, 08/29/1997, 06/03/1998   • Influenza, Unspecified Formulation 10/19/2017   • Influenza, injectable, quadrivalent 11/24/2015   • Influenza, injectable, quadrivalent, preservative-free 09/29/2016   • Influenza, seasonal, injectable, trivalent 11/02/2012   • MMR 01/28/1988, 08/10/1992   • Meningococcal polysaccharide (MPSV4) 07/05/2005   • PPD 08/16/2016   • Polio, Oral 05/21/1987, 09/21/1987, 11/28/1988, 05/14/1991   • Tdap 06/26/2007, 08/31/2014     Past Medical History:   Diagnosis Date   • Gastroesophageal reflux disease    • MRSA (methicillin resistant Staphylococcus aureus) 2009    Buttock   • Pelvic pain in female 5/25/2016    Dyspareunia, bleeding with intercourse. Patient has a Mirena in place. Ultrasound was ordered to evaluate     Past Surgical History:   Procedure Laterality Date   • Esophagogastroduodenoscopy transoral flex w/bx single or mult  5/24/2016    Dr. Jeremiah Alcaraz. Mild erythema GE junction. reflux changes on biopsies.    • Tympanostomy tube placement      age [age], bilateral     Social History     Social History   • Marital status: Single     Spouse name: N/A   • Number of children: 1   • Years of education: N/A     Occupational History   • student      [employer]     Social History Main Topics   • Smoking status: Former Smoker     Quit date: 5/28/2009   • Smokeless tobacco: Never Used      Comment: Attending [employer]   • Alcohol use 0.6 oz/week     1 Standard drinks or equivalent per week      Comment: rare   • Drug use: No   • Sexual activity: Yes     Partners: Male     Birth control/  protection: None     Other Topics Concern   • None     Social History Narrative   • None     Family History   Problem Relation Age of Onset   • Bleeding Disorder Daughter      platelet function defect     Health Maintenance   Topic Date Due   • Depression Screening  03/30/2019   • Cervical Cancer Screening HPV CO-Testing  12/22/2020   • DTaP/Tdap/Td Vaccine (7 - Td) 08/31/2024   • Influenza Vaccine  Completed       PHYSICAL EXAM:   Vital Signs:    Visit Vitals  /80 (BP Location: [facility] [place], Patient Position: Sitting, Cuff Size: Regular)   Pulse 62   Temp 98.8 °F (37.1 °C) (Oral)   Ht 5' 3\" (1.6 m)   Wt 71.5 kg Comment: 158lbs   LMP 03/18/2018   SpO2 99%   BMI 27.92 kg/m²     Pulse Ox Interpretation:  Within normal limits.  General:  32 y/o here on her own.  Alert, cooperative, conversive in no acute distress.  Skin:  Warm and dry without rash.    Head:  Normocephalic, atraumatic.   Neck:  Trachea is midline. No adenopathy.  Normal thyroid without mass or tenderness..   Eyes:  Normal conjunctivae and sclerae.  Pupils equal, round and reactive to light.  Extraocular movements intact.  ENT:  Mucous membranes are moist.  Normal tympanic membranes and external auditory canals bilaterally.  No pharyngeal erythema or exudate.  No facial tenderness.  Normal nasal mucosa.  Chest: Breast exam negative   Lymphatics: no palpable nodes in axilla or groin, liver and spleen nonpalpable.  Cardiovascular:  Symmetrical pulses.  Regular rate and rhythm without murmur.  Respiratory:  Normal respiratory effort.  Clear to auscultation.  No wheezes, rales or rhonchi.  Gastrointestinal:  Soft and non tender.  Normal bowel sounds.  No hepatomegaly or splenomegaly.   Musculoskeletal:  No deformity or edema.  No tenderness to palpation.  Back:   Normal alignment.  No costovertebral angle tenderness or midline bony tenderness.  Neurologic:   Oriented x 4.  Cranial nerves II-XII are intact.  No focal deficits or lateralizing signs.  Psychiatric:    Cooperative.  Appropriate mood and affect.    LAB RESULTS:   Labs ordered no results yet    ASSESSMENT:   1. Fatigue, unspecified type    2. Multiple joint pain    3. Upper back pain on left side    4. Upper back pain on right side        PLAN:   Orders Placed This Encounter   • XR Chest PA and Lateral   • CBC & Auto Differential   • Comprehensive Metabolic Panel   • Thyroid Stimulating Hormone   • Rheumatoid Factor   • JESSE Screen with AB and IFA Reflex   • Ferritin   • Iron and TIBC   • Vitamin B12 Level   • Folate Level   • Haptoglobin   • Reticulocyte Count Automated   • Occult Blood Test Tube     Anxiety and depression screening done and she was negative  All physical exam was negative.  Labs are ordered chest x-ray was ordered  Will call her with results of labs and chest x-ray.    Return if symptoms worsen or fail to improve, for persisting symptoms.    Instructions provided as documented in the after visit summary.    The patient indicated understanding of the diagnosis and agreed with the plan of care.

## 2025-02-20 NOTE — DISCHARGE SUMMARY
hydroxide-simethicone 200-200-20 MG/5ML Susp suspension  Commonly known as: MAALOX PLUS     ammonium lactate 12 % cream  Commonly known as: AMLACTIN     busPIRone 30 MG tablet  Commonly known as: BUSPAR     * chlorhexidine 0.12 % solution  Commonly known as: PERIDEX     * chlorhexidine 0.12 % solution  Commonly known as: PERIDEX     cloNIDine 0.1 MG tablet  Commonly known as: CATAPRES     * dextromethorphan-guaiFENesin  MG per extended release tablet  Commonly known as: MUCINEX DM     * Dextromethorphan-guaiFENesin  MG/10ML Syrp     docusate sodium 100 MG capsule  Commonly known as: COLACE     enalapril 20 MG tablet  Commonly known as: VASOTEC     ibuprofen 600 MG tablet  Commonly known as: ADVIL;MOTRIN     lactulose 10 GM/15ML solution  Commonly known as: CHRONULAC     loratadine 10 MG tablet  Commonly known as: CLARITIN     * LORazepam 2 MG/ML injection  Commonly known as: ATIVAN     * LORazepam 1 MG tablet  Commonly known as: ATIVAN     magnesium hydroxide 400 MG/5ML suspension  Commonly known as: MILK OF MAGNESIA     meclizine 25 MG tablet  Commonly known as: ANTIVERT  Take 1 tablet by mouth 3 times daily as needed for Dizziness     metoprolol tartrate 25 MG tablet  Commonly known as: LOPRESSOR  Take 1 tablet by mouth 2 times daily     mineral oil-hydrophilic petrolatum ointment     ondansetron 4 MG disintegrating tablet  Commonly known as: ZOFRAN-ODT  Take 1 tablet by mouth 3 times daily as needed for Nausea or Vomiting     * OXcarbazepine 150 MG tablet  Commonly known as: TRILEPTAL     * OXcarbazepine 600 MG tablet  Commonly known as: TRILEPTAL     * OXcarbazepine 600 MG tablet  Commonly known as: TRILEPTAL     * promethazine 25 MG tablet  Commonly known as: PHENERGAN     * promethazine 25 MG suppository  Commonly known as: PHENERGAN     * QUEtiapine 200 MG tablet  Commonly known as: SEROQUEL     * QUEtiapine 400 MG tablet  Commonly known as: SEROQUEL     SODIUM FLUORIDE (DENTAL GEL) 1.1 % Crea

## 2025-02-20 NOTE — DISCHARGE INSTR - COC
Continuity of Care Form    Patient Name: Thierry Lizama   :  1985  MRN:  [id number]    Admit date:  2025  Discharge date:  2025    Code Status Order: DNR-CCA   Advance Directives:   Advance Care Flowsheet Documentation             Admitting Physician:  Rogelio Zhu MD  PCP: No primary care provider on file.    Discharging Nurse: Tana Gonzales  Discharging Hospital Unit/Room#: 0315/0315-01  Discharging Unit Phone Number: [phone]    Emergency Contact:   Extended Emergency Contact Information  Primary Emergency Contact: ELISA NORTON  Address: [address] [place], [place] [address] [facility]  Home Phone: [phone]  Work Phone: [phone]  Mobile Phone: [phone]  Relation: Legal Guardian  Hearing or visual needs: None  Other needs: None  Preferred language: English   needed? No  Secondary Emergency Contact: Ciro Holden  Mobile Phone: [phone]  Relation: Brother/Sister    Past Surgical History:  Past Surgical History:   Procedure Laterality Date    CARDIAC SURGERY      AT BIRTH    LUNG SURGERY  2014    lung mass removal (med record from [facility])    PILONIDAL CYST DRAINAGE         Immunization History:   Immunization History   Administered Date(s) Administered    COVID-19, MODERNA BLUE border, Primary or Immunocompromised, (age 12y+), IM, 100 mcg/0.5mL 2021, 2021, 2021    Influenza Vaccine, unspecified formulation 2017    TDaP, ADACEL (age 10y-64y), BOOSTRIX (age 10y+), IM, 0.5mL 2018       Active Problems:  Patient Active Problem List   Diagnosis Code    Chronic mental illness F99    Frostbite of feet, bilateral T33.821A, T33.822A    Sepsis (HCC) A41.9    E coli bacteremia R78.81, B96.20    E. coli UTI N39.0, B96.20    Hypertension I10    Aortic valve stenosis I35.0    Coarctation of aorta Q25.1    History of seizure disorder Z86.69    Acute kidney injury N17.9    Lactic acidosis E87.20

## 2025-02-20 NOTE — PROGRESS NOTES
Physical Therapy  Facility/Department: [facility] MED SURG  Daily Treatment Note  NAME: Thierry Lizama  : 1985  MRN: [id number]    Date of Service: 2025    Discharge Recommendations:  Continue to assess pending progress, Long Term Care with PT     Patient Diagnosis(es): The primary encounter diagnosis was Dehydration. Diagnoses of Viral syndrome, Urinary tract infection without hematuria, site unspecified, and Influenza A were also pertinent to this visit.    Assessment  Assessment: Transfers: SBA. Pt ambulated for 150 ft x1 with RW and CGA with no LOB. Pt then ambulated for 100 ft x1 with no AD and CGA with increased unsteadiness and slight LOB with turning. Educated pt mother on pt benefitting from use of RW when d/c to improve gait pattern and decrease fall risk. Pt completes seated B LE exercises x15 reps with constant verbal, visual, and tactile cues.  Activity Tolerance: Patient tolerated treatment well    Plan  Physical Therapy Plan  General Plan: 2 times a day 7 days a week  Specific Instructions for Next Treatment: 1x/ day on weekends  Current Treatment Recommendations: Strengthening;ROM;Balance training;Functional mobility training;Transfer training;ADL/Self-care training;Manual;Neuromuscular re-education;Stair training;Gait training;Endurance training;Home exercise program;Safety education & training;Patient/Caregiver education & training;Therapeutic activities;Positioning;Modalities    Restrictions  Restrictions/Precautions  Restrictions/Precautions: Fall Risk, Isolation  Required Braces or Orthoses?: No     Subjective   Subjective  Subjective: Pt. in chair upon arrival with mother at bed side, agreeable to work with therapy. Pt is just finishing up with breakfast.  Pain: denies    Objective  Bed Mobility Training  Bed Mobility Training: No  Transfer Training  Transfer Training: Yes  Overall Level of Assistance: Stand by assistance  Interventions: Verbal cues  Sit to Stand: Stand by

## 2025-02-20 NOTE — PROGRESS NOTES
[facility]  INPATIENT SPEECH THERAPY  [facility] MED SURG  Dysphagia Treatment      Date: 2025  Patient Name: Thierry Lizama      CSN: [id number]   : 1985  (39 y.o.)  Gender: male   Referring Physician:  Dr. Zhu  Diagnosis: Viral infection   Precautions: Fall precautions  Current Diet: Soft and bite sized solids, thin liquids  Respiratory Status: Room Air  Swallowing Strategies: Standard Universal Swallow Precautions  Date of Last MBS/FEES:  2021.    Pain:  No pain reported.    Subjective:  Patient seen for dysphagia tx seated upright in chair for breakfast meal with his mother at bedside. Patient seen with soft and bite sized breakfast tray this date. Mother reports pt has a persistent cough at baseline d/t underlying heart conditions. MBSS completed approximately 3 years ago revealed coughing without aspiration or penetration during flouro. Patient presents from [facility] and is noted to be able to follow basic 1-step directions.     Short-Term Goals:  Short-Term Goal Timeframe: 7 Days    SHORT TERM GOAL #1:  Goal 1: Patient will follow compensatory strategies during mealtimes.  INTERVENTIONS: Patient was able to consume breakfast meal utilizing a slow eating pace independently. Pt required mod-max verbal and visual cueing to take small bites and small sips and alternate bites and sips. . Without verbal cueing, pt was noted to take large bites and chug thin liquids via straw. Patient may benefit from sip size reducing cup such as Provale Cup. Discussed this with mother.     SHORT TERM GOAL #2:  Goal 2: Patient will tolerate diet with minimal signs of penetration/aspiration.  INTERVENTIONS: Patient completed trials of soft and bite-sized solids and thin liquids. Patient tolerated trials well with no pocketing or oral residues remaining post-swallow. Patient demonstrated strong, persistent cough x 1/ 10 with thin liquids when taking large sip (approximately 3 oz

## 2025-02-20 NOTE — PROGRESS NOTES
Progress Note    SUBJECTIVE:    Patient seen for f/u of Multifocal pneumonia.  He sitting up in chair no distress. Visitor at side.  Cooperative.          ROS:   Constitutional: negative  for fevers, and negative for chills.  Respiratory: negative for shortness of breath, positive for cough, and negative for wheezing  Cardiovascular: negative for chest pain, and negative for palpitations  Gastrointestinal: negative for abdominal pain, negative for nausea,negative for vomiting, negative for diarrhea, and negative for constipation     All other systems were reviewed with the patient and are negative unless otherwise stated in HPI      OBJECTIVE:      Vitals:   Vitals:    02/20/25 0441   BP:    Pulse: 91   Resp: 22   Temp:    SpO2: 93%     Weight - Scale: 98.9 kg (218 lb)   Height: 162.6 cm (5' 4\")     Weight  Wt Readings from Last 3 Encounters:   02/20/25 98.9 kg (218 lb)   02/16/25 103.4 kg (228 lb)   12/04/24 102.9 kg (226 lb 13.7 oz)     Body mass index is 37.42 kg/m².    24HR INTAKE/OUTPUT:      Intake/Output Summary (Last 24 hours) at 2/20/2025 0628  Last data filed at 2/19/2025 1707  Gross per 24 hour   Intake 800 ml   Output 1050 ml   Net -250 ml     -----------------------------------------------------------------  Exam:    GEN:    Awake, alert and cooperative and pleasant.  Hoarse voice.   EYES:  EOMI, pupils equal   NECK: Supple. No lymphadenopathy.  No carotid bruit  CVS:    regular rate and rhythm, no audible murmur  PULM:  diminished but clear without wheezing, rales or rhonchi, no acute respiratory distress  ABD:    Bowels sounds normal.  Abdomen is soft.  No distention.  no tenderness to palpation.   EXT:   no edema bilaterally .  No calf tenderness.   NEURO: Moves all extremities.  Motor and sensory are grossly intact  SKIN:  No rashes.  No skin lesions.    -----------------------------------------------------------------    Diagnostic Data:      Complete Blood Count:   Recent Labs     02/18/25  0615

## 2025-02-20 NOTE — PROGRESS NOTES
Vitals and assessment were completed at this time. Patient's mother remains in the room and has been caring for patient during his stay. Patient is in bed sitting up for writer to do the assessment. Lung sounds are clear throughout. Patient is oriented to person and place but not time or situation.   Writer walked patient and his mother through the medications that would be administered tonight and encouraged her to ask questions about the medications and therapies. She denies questions.   Call light and bedside table remain within reach. Patient denies needs at this time. Care ongoing.

## 2025-02-20 NOTE — CARE COORDINATION
Patient is discharge back to [facility] today.  Paper work ready for discharge.  STELLA Ramsey

## 2025-02-20 NOTE — PLAN OF CARE
Problem: Discharge Planning  Goal: Discharge to home or other facility with appropriate resources  Outcome: Progressing  Flowsheets (Taken 2/19/2025 1945)  Discharge to home or other facility with appropriate resources:   Identify barriers to discharge with patient and caregiver   Arrange for needed discharge resources and transportation as appropriate   Identify discharge learning needs (meds, wound care, etc)   Refer to discharge planning if patient needs post-hospital services based on physician order or complex needs related to functional status, cognitive ability or social support system     Problem: Safety - Adult  Goal: Free from fall injury  Outcome: Progressing  Flowsheets (Taken 2/20/2025 0151)  Free From Fall Injury:   Instruct family/caregiver on patient safety   Based on caregiver fall risk screen, instruct family/caregiver to ask for assistance with transferring infant if caregiver noted to have fall risk factors     Problem: Nutrition Deficit:  Goal: Optimize nutritional status  Outcome: Progressing  Flowsheets (Taken 2/20/2025 0151)  Nutrient intake appropriate for improving, restoring, or maintaining nutritional needs: Monitor oral intake, labs, and treatment plans     Problem: ABCDS Injury Assessment  Goal: Absence of physical injury  Outcome: Progressing  Flowsheets (Taken 2/20/2025 0151)  Absence of Physical Injury: Implement safety measures based on patient assessment

## 2025-02-20 NOTE — PROGRESS NOTES
Report called to [facility] at this time to DANIEL Salomon. All questions answered to this time. Call back number given. Staff to  pt around 1300.

## 2025-02-20 NOTE — PROGRESS NOTES
Writer to bedside to complete morning assessment. Upon entry to room, pt sitting up in chair, respirations even and unlabored while on room air. Vitals obtained and assessment completed, see flow sheet for details. Pt denies needs from writer at this time. Mom remains at bedside with pt at this time. Call light in reach. Care ongoing.

## 2025-02-21 LAB
MICROORGANISM SPEC CULT: NORMAL
MICROORGANISM SPEC CULT: NORMAL
SERVICE CMNT-IMP: NORMAL
SERVICE CMNT-IMP: NORMAL
SPECIMEN DESCRIPTION: NORMAL
SPECIMEN DESCRIPTION: NORMAL

## 2025-02-27 ENCOUNTER — HOSPITAL ENCOUNTER (OUTPATIENT)
Dept: GENERAL RADIOLOGY | Age: 40
Discharge: HOME | End: 2025-03-01
Payer: MEDICARE

## 2025-02-27 ENCOUNTER — HOSPITAL ENCOUNTER (OUTPATIENT)
Age: 40
Discharge: HOME | End: 2025-03-01
Payer: MEDICARE

## 2025-02-27 DIAGNOSIS — J10.1 INFLUENZA A: ICD-10-CM

## 2025-02-27 PROCEDURE — 71046 X-RAY EXAM CHEST 2 VIEWS: CPT

## 2025-03-10 ENCOUNTER — HOSPITAL ENCOUNTER (OUTPATIENT)
Age: 40
Discharge: HOME | End: 2025-03-10
Payer: MEDICARE

## 2025-03-10 PROCEDURE — 93005 ELECTROCARDIOGRAM TRACING: CPT | Performed by: FAMILY MEDICINE

## 2025-03-11 LAB
EKG ATRIAL RATE: 79 BPM
EKG P AXIS: 50 DEGREES
EKG P-R INTERVAL: 232 MS
EKG Q-T INTERVAL: 358 MS
EKG QRS DURATION: 102 MS
EKG QTC CALCULATION (BAZETT): 410 MS
EKG R AXIS: -9 DEGREES
EKG T AXIS: 14 DEGREES
EKG VENTRICULAR RATE: 79 BPM

## 2025-03-11 PROCEDURE — 93010 ELECTROCARDIOGRAM REPORT: CPT | Performed by: FAMILY MEDICINE

## 2025-03-20 PROBLEM — J10.1 INFLUENZA A: Status: RESOLVED | Noted: 2025-02-18 | Resolved: 2025-03-20

## 2025-03-24 ENCOUNTER — HOSPITAL ENCOUNTER (OUTPATIENT)
Age: 40
Setting detail: SPECIMEN
Discharge: HOME | End: 2025-03-24
Payer: MEDICARE

## 2025-03-24 LAB
ALBUMIN SERPL-MCNC: 4.2 G/DL (ref 3.5–5.2)
ALBUMIN/GLOB SERPL: 2.7 {RATIO} (ref 1–2.5)
ALP SERPL-CCNC: 56 U/L (ref 40–129)
ALT SERPL-CCNC: 29 U/L (ref 10–50)
AMMONIA PLAS-SCNC: 59 UMOL/L (ref 11–51)
ANION GAP SERPL CALCULATED.3IONS-SCNC: 10 MMOL/L (ref 9–16)
AST SERPL-CCNC: 22 U/L (ref 10–50)
BASOPHILS # BLD: 0.05 K/UL (ref 0–0.2)
BASOPHILS NFR BLD: 1 % (ref 0–2)
BILIRUB SERPL-MCNC: 0.4 MG/DL (ref 0–1.2)
BUN SERPL-MCNC: 18 MG/DL (ref 6–20)
BUN/CREAT SERPL: 23 (ref 9–20)
CALCIUM SERPL-MCNC: 9 MG/DL (ref 8.6–10.4)
CHLORIDE SERPL-SCNC: 108 MMOL/L (ref 98–107)
CHOLEST SERPL-MCNC: 175 MG/DL (ref 0–199)
CHOLESTEROL/HDL RATIO: 4.2
CO2 SERPL-SCNC: 26 MMOL/L (ref 20–31)
CREAT SERPL-MCNC: 0.8 MG/DL (ref 0.7–1.2)
EOSINOPHIL # BLD: 0.25 K/UL (ref 0–0.44)
EOSINOPHILS RELATIVE PERCENT: 4 % (ref 1–4)
ERYTHROCYTE [DISTWIDTH] IN BLOOD BY AUTOMATED COUNT: 14.8 % (ref 11.8–14.4)
EST. AVERAGE GLUCOSE BLD GHB EST-MCNC: 91 MG/DL
GFR, ESTIMATED: >90 ML/MIN/1.73M2
GLUCOSE SERPL-MCNC: 91 MG/DL (ref 74–99)
HBA1C MFR BLD: 4.8 % (ref 4–6)
HCT VFR BLD AUTO: 44.2 % (ref 40.7–50.3)
HDLC SERPL-MCNC: 42 MG/DL
HGB BLD-MCNC: 14.1 G/DL (ref 13–17)
IMM GRANULOCYTES # BLD AUTO: 0.03 K/UL (ref 0–0.3)
IMM GRANULOCYTES NFR BLD: 1 %
LDLC SERPL CALC-MCNC: 104 MG/DL (ref 0–100)
LYMPHOCYTES NFR BLD: 2.53 K/UL (ref 1.1–3.7)
LYMPHOCYTES RELATIVE PERCENT: 42 % (ref 24–43)
MCH RBC QN AUTO: 29.3 PG (ref 25.2–33.5)
MCHC RBC AUTO-ENTMCNC: 31.9 G/DL (ref 28.4–34.8)
MCV RBC AUTO: 91.9 FL (ref 82.6–102.9)
MONOCYTES NFR BLD: 0.55 K/UL (ref 0.1–1.2)
MONOCYTES NFR BLD: 9 % (ref 3–12)
NEUTROPHILS NFR BLD: 43 % (ref 36–65)
NEUTS SEG NFR BLD: 2.69 K/UL (ref 1.5–8.1)
NRBC BLD-RTO: 0 PER 100 WBC
PLATELET # BLD AUTO: 146 K/UL (ref 138–453)
PMV BLD AUTO: 9.6 FL (ref 8.1–13.5)
POTASSIUM SERPL-SCNC: 4.2 MMOL/L (ref 3.7–5.3)
PROT SERPL-MCNC: 5.8 G/DL (ref 6.6–8.7)
RBC # BLD AUTO: 4.81 M/UL (ref 4.21–5.77)
SODIUM SERPL-SCNC: 144 MMOL/L (ref 136–145)
TRIGL SERPL-MCNC: 144 MG/DL
VALPROATE SERPL-MCNC: 76 UG/ML (ref 50–125)
VLDLC SERPL CALC-MCNC: 29 MG/DL (ref 1–30)
WBC OTHER # BLD: 6.1 K/UL (ref 3.5–11.3)

## 2025-03-24 PROCEDURE — 80061 LIPID PANEL: CPT

## 2025-03-24 PROCEDURE — 80053 COMPREHEN METABOLIC PANEL: CPT

## 2025-03-24 PROCEDURE — 36415 COLL VENOUS BLD VENIPUNCTURE: CPT

## 2025-03-24 PROCEDURE — 85025 COMPLETE CBC W/AUTO DIFF WBC: CPT

## 2025-03-24 PROCEDURE — 80164 ASSAY DIPROPYLACETIC ACD TOT: CPT

## 2025-03-24 PROCEDURE — 80183 DRUG SCRN QUANT OXCARBAZEPIN: CPT

## 2025-03-24 PROCEDURE — 82140 ASSAY OF AMMONIA: CPT

## 2025-03-24 PROCEDURE — 83036 HEMOGLOBIN GLYCOSYLATED A1C: CPT

## 2025-03-24 PROCEDURE — P9603 ONE-WAY ALLOW PRORATED MILES: HCPCS

## 2025-03-26 LAB — 10OH-CARBAZEPINE SERPL-MCNC: 30.1 UG/ML (ref 10–35)

## 2025-08-25 ENCOUNTER — OFFICE VISIT (OUTPATIENT)
Dept: CARDIOLOGY | Age: 40
End: 2025-08-25
Payer: MEDICARE

## 2025-08-25 VITALS
HEART RATE: 87 BPM | DIASTOLIC BLOOD PRESSURE: 74 MMHG | OXYGEN SATURATION: 96 % | BODY MASS INDEX: 35.7 KG/M2 | WEIGHT: 208 LBS | SYSTOLIC BLOOD PRESSURE: 122 MMHG

## 2025-08-25 DIAGNOSIS — I35.0 MILD AORTIC STENOSIS: ICD-10-CM

## 2025-08-25 DIAGNOSIS — Q24.9 CONGENITAL HEART DISEASE: Primary | ICD-10-CM

## 2025-08-25 DIAGNOSIS — I35.1 SEVERE AORTIC REGURGITATION: ICD-10-CM

## 2025-08-25 DIAGNOSIS — I10 PRIMARY HYPERTENSION: ICD-10-CM

## 2025-08-25 PROCEDURE — 3074F SYST BP LT 130 MM HG: CPT | Performed by: NURSE PRACTITIONER

## 2025-08-25 PROCEDURE — G8428 CUR MEDS NOT DOCUMENT: HCPCS | Performed by: NURSE PRACTITIONER

## 2025-08-25 PROCEDURE — 99214 OFFICE O/P EST MOD 30 MIN: CPT | Performed by: NURSE PRACTITIONER

## 2025-08-25 PROCEDURE — G8417 CALC BMI ABV UP PARAM F/U: HCPCS | Performed by: NURSE PRACTITIONER

## 2025-08-25 PROCEDURE — 3078F DIAST BP <80 MM HG: CPT | Performed by: NURSE PRACTITIONER

## 2025-08-25 PROCEDURE — 1036F TOBACCO NON-USER: CPT | Performed by: NURSE PRACTITIONER

## 2025-08-27 ENCOUNTER — TELEPHONE (OUTPATIENT)
Dept: CARDIOLOGY | Age: 40
End: 2025-08-27

## 2025-08-27 ENCOUNTER — RESULTS FOLLOW-UP (OUTPATIENT)
Dept: CARDIOLOGY | Age: 40
End: 2025-08-27

## 2025-08-27 ENCOUNTER — HOSPITAL ENCOUNTER (OUTPATIENT)
Age: 40
Setting detail: SPECIMEN
Discharge: HOME | End: 2025-08-27
Payer: MEDICARE

## 2025-08-27 DIAGNOSIS — I35.0 MILD AORTIC STENOSIS: ICD-10-CM

## 2025-08-27 DIAGNOSIS — Q24.9 CONGENITAL HEART DISEASE: ICD-10-CM

## 2025-08-27 DIAGNOSIS — I35.1 SEVERE AORTIC REGURGITATION: ICD-10-CM

## 2025-08-27 DIAGNOSIS — I10 PRIMARY HYPERTENSION: ICD-10-CM

## 2025-08-27 LAB
ANION GAP SERPL CALCULATED.3IONS-SCNC: 14 MMOL/L (ref 9–16)
BUN SERPL-MCNC: 17 MG/DL (ref 6–20)
BUN/CREAT SERPL: 21 (ref 9–20)
CALCIUM SERPL-MCNC: 9.1 MG/DL (ref 8.6–10.4)
CHLORIDE SERPL-SCNC: 100 MMOL/L (ref 98–107)
CO2 SERPL-SCNC: 24 MMOL/L (ref 20–31)
CREAT SERPL-MCNC: 0.8 MG/DL (ref 0.7–1.2)
ERYTHROCYTE [DISTWIDTH] IN BLOOD BY AUTOMATED COUNT: 13.3 % (ref 11.8–14.4)
GFR, ESTIMATED: >90 ML/MIN/1.73M2
GLUCOSE SERPL-MCNC: 85 MG/DL (ref 74–99)
HCT VFR BLD AUTO: 43 % (ref 40.7–50.3)
HGB BLD-MCNC: 14.4 G/DL (ref 13–17)
MCH RBC QN AUTO: 29.3 PG (ref 25.2–33.5)
MCHC RBC AUTO-ENTMCNC: 33.5 G/DL (ref 28.4–34.8)
MCV RBC AUTO: 87.6 FL (ref 82.6–102.9)
NRBC BLD-RTO: 0 PER 100 WBC
PLATELET # BLD AUTO: 150 K/UL (ref 138–453)
PMV BLD AUTO: 9.3 FL (ref 8.1–13.5)
POTASSIUM SERPL-SCNC: 4.2 MMOL/L (ref 3.7–5.3)
RBC # BLD AUTO: 4.91 M/UL (ref 4.21–5.77)
SODIUM SERPL-SCNC: 138 MMOL/L (ref 136–145)
WBC OTHER # BLD: 6.9 K/UL (ref 3.5–11.3)

## 2025-08-27 PROCEDURE — 85027 COMPLETE CBC AUTOMATED: CPT

## 2025-08-27 PROCEDURE — 80048 BASIC METABOLIC PNL TOTAL CA: CPT

## 2025-08-27 PROCEDURE — 36415 COLL VENOUS BLD VENIPUNCTURE: CPT
